# Patient Record
Sex: FEMALE | Race: WHITE | NOT HISPANIC OR LATINO | URBAN - METROPOLITAN AREA
[De-identification: names, ages, dates, MRNs, and addresses within clinical notes are randomized per-mention and may not be internally consistent; named-entity substitution may affect disease eponyms.]

---

## 2017-06-04 ENCOUNTER — INPATIENT (INPATIENT)
Facility: HOSPITAL | Age: 82
LOS: 3 days | Discharge: NURSING, REHAB OR LONG-TERM CARE | End: 2017-06-08
Attending: FAMILY MEDICINE | Admitting: FAMILY MEDICINE
Payer: MEDICARE

## 2017-06-04 VITALS
RESPIRATION RATE: 18 BRPM | WEIGHT: 162.04 LBS | HEIGHT: 64 IN | OXYGEN SATURATION: 100 % | HEART RATE: 53 BPM | SYSTOLIC BLOOD PRESSURE: 149 MMHG | DIASTOLIC BLOOD PRESSURE: 63 MMHG | TEMPERATURE: 98 F

## 2017-06-04 LAB
ALBUMIN SERPL ELPH-MCNC: 3.2 G/DL — LOW (ref 3.3–5)
ALP SERPL-CCNC: 79 U/L — SIGNIFICANT CHANGE UP (ref 40–120)
ALT FLD-CCNC: 15 U/L — SIGNIFICANT CHANGE UP (ref 12–78)
ANION GAP SERPL CALC-SCNC: 4 MMOL/L — LOW (ref 5–17)
APTT BLD: 42.2 SEC — HIGH (ref 27.5–37.4)
AST SERPL-CCNC: 20 U/L — SIGNIFICANT CHANGE UP (ref 15–37)
BASOPHILS # BLD AUTO: 0.1 K/UL — SIGNIFICANT CHANGE UP (ref 0–0.2)
BASOPHILS NFR BLD AUTO: 0.9 % — SIGNIFICANT CHANGE UP (ref 0–2)
BILIRUB SERPL-MCNC: 0.3 MG/DL — SIGNIFICANT CHANGE UP (ref 0.2–1.2)
BUN SERPL-MCNC: 20 MG/DL — SIGNIFICANT CHANGE UP (ref 7–23)
CALCIUM SERPL-MCNC: 9 MG/DL — SIGNIFICANT CHANGE UP (ref 8.5–10.1)
CHLORIDE SERPL-SCNC: 104 MMOL/L — SIGNIFICANT CHANGE UP (ref 96–108)
CO2 SERPL-SCNC: 29 MMOL/L — SIGNIFICANT CHANGE UP (ref 22–31)
CREAT SERPL-MCNC: 1.1 MG/DL — SIGNIFICANT CHANGE UP (ref 0.5–1.3)
EOSINOPHIL # BLD AUTO: 0.5 K/UL — SIGNIFICANT CHANGE UP (ref 0–0.5)
EOSINOPHIL NFR BLD AUTO: 7.2 % — HIGH (ref 0–6)
GLUCOSE SERPL-MCNC: 170 MG/DL — HIGH (ref 70–99)
HCT VFR BLD CALC: 37.7 % — SIGNIFICANT CHANGE UP (ref 34.5–45)
HGB BLD-MCNC: 12.8 G/DL — SIGNIFICANT CHANGE UP (ref 11.5–15.5)
INR BLD: 2.67 RATIO — HIGH (ref 0.88–1.16)
LYMPHOCYTES # BLD AUTO: 1.4 K/UL — SIGNIFICANT CHANGE UP (ref 1–3.3)
LYMPHOCYTES # BLD AUTO: 19.7 % — SIGNIFICANT CHANGE UP (ref 13–44)
MCHC RBC-ENTMCNC: 32 PG — SIGNIFICANT CHANGE UP (ref 27–34)
MCHC RBC-ENTMCNC: 34 GM/DL — SIGNIFICANT CHANGE UP (ref 32–36)
MCV RBC AUTO: 94.1 FL — SIGNIFICANT CHANGE UP (ref 80–100)
MONOCYTES # BLD AUTO: 0.7 K/UL — SIGNIFICANT CHANGE UP (ref 0–0.9)
MONOCYTES NFR BLD AUTO: 9.2 % — SIGNIFICANT CHANGE UP (ref 2–14)
NEUTROPHILS # BLD AUTO: 4.6 K/UL — SIGNIFICANT CHANGE UP (ref 1.8–7.4)
NEUTROPHILS NFR BLD AUTO: 63 % — SIGNIFICANT CHANGE UP (ref 43–77)
PLATELET # BLD AUTO: 181 K/UL — SIGNIFICANT CHANGE UP (ref 150–400)
POTASSIUM SERPL-MCNC: 4.4 MMOL/L — SIGNIFICANT CHANGE UP (ref 3.5–5.3)
POTASSIUM SERPL-SCNC: 4.4 MMOL/L — SIGNIFICANT CHANGE UP (ref 3.5–5.3)
PROT SERPL-MCNC: 7.1 GM/DL — SIGNIFICANT CHANGE UP (ref 6–8.3)
PROTHROM AB SERPL-ACNC: 29.4 SEC — HIGH (ref 9.8–12.7)
RBC # BLD: 4 M/UL — SIGNIFICANT CHANGE UP (ref 3.8–5.2)
RBC # FLD: 11.9 % — SIGNIFICANT CHANGE UP (ref 10.3–14.5)
SODIUM SERPL-SCNC: 137 MMOL/L — SIGNIFICANT CHANGE UP (ref 135–145)
WBC # BLD: 7.3 K/UL — SIGNIFICANT CHANGE UP (ref 3.8–10.5)
WBC # FLD AUTO: 7.3 K/UL — SIGNIFICANT CHANGE UP (ref 3.8–10.5)

## 2017-06-04 PROCEDURE — 73060 X-RAY EXAM OF HUMERUS: CPT | Mod: 26,RT

## 2017-06-04 PROCEDURE — 99285 EMERGENCY DEPT VISIT HI MDM: CPT

## 2017-06-04 PROCEDURE — 70450 CT HEAD/BRAIN W/O DYE: CPT | Mod: 26

## 2017-06-04 PROCEDURE — 73502 X-RAY EXAM HIP UNI 2-3 VIEWS: CPT | Mod: 26

## 2017-06-04 PROCEDURE — 93010 ELECTROCARDIOGRAM REPORT: CPT

## 2017-06-04 PROCEDURE — 72125 CT NECK SPINE W/O DYE: CPT | Mod: 26

## 2017-06-04 PROCEDURE — 71010: CPT | Mod: 26

## 2017-06-04 PROCEDURE — 73030 X-RAY EXAM OF SHOULDER: CPT | Mod: 26,RT

## 2017-06-04 RX ORDER — ACETAMINOPHEN 500 MG
650 TABLET ORAL ONCE
Qty: 0 | Refills: 0 | Status: DISCONTINUED | OUTPATIENT
Start: 2017-06-04 | End: 2017-06-04

## 2017-06-04 RX ORDER — MORPHINE SULFATE 50 MG/1
4 CAPSULE, EXTENDED RELEASE ORAL ONCE
Qty: 0 | Refills: 0 | Status: DISCONTINUED | OUTPATIENT
Start: 2017-06-04 | End: 2017-06-04

## 2017-06-04 RX ORDER — ONDANSETRON 8 MG/1
4 TABLET, FILM COATED ORAL ONCE
Qty: 0 | Refills: 0 | Status: COMPLETED | OUTPATIENT
Start: 2017-06-04 | End: 2017-06-04

## 2017-06-04 RX ADMIN — MORPHINE SULFATE 4 MILLIGRAM(S): 50 CAPSULE, EXTENDED RELEASE ORAL at 19:59

## 2017-06-04 RX ADMIN — MORPHINE SULFATE 4 MILLIGRAM(S): 50 CAPSULE, EXTENDED RELEASE ORAL at 20:13

## 2017-06-04 RX ADMIN — ONDANSETRON 4 MILLIGRAM(S): 8 TABLET, FILM COATED ORAL at 19:59

## 2017-06-04 NOTE — ED ADULT NURSE NOTE - FALL HARM RISK
bones(Osteoporosis,prev fx,steroid use,metastatic bone ca/age(85 years old or older)/coagulation(Bleeding disorder R/T clinical cond/anti-coags)

## 2017-06-04 NOTE — ED PROVIDER NOTE - NS ED MD SCRIBE ATTENDING SCRIBE SECTIONS
HISTORY OF PRESENT ILLNESS/DISPOSITION/RESULTS/REVIEW OF SYSTEMS/PROGRESS NOTE/VITAL SIGNS( Pullset)/PHYSICAL EXAM/PAST MEDICAL/SURGICAL/SOCIAL HISTORY

## 2017-06-04 NOTE — ED PROVIDER NOTE - DETAILS:
I, Benny Schmidt, performed the initial face to face bedside interview with this patient regarding history of present illness, review of symptoms and relevant past medical, social and family history.  I completed an independent physical examination.  I was the initial provider who evaluated this patient.  The history, relevant review of systems, past medical and surgical history, medical decision making, and physical examination was documented by the scribe in my presence and I attest to the accuracy of the documentation.

## 2017-06-04 NOTE — ED PROVIDER NOTE - OBJECTIVE STATEMENT
89 y/o female with PMHx of HTN, HLD, DVT (on coumadin) BIBEMS from [facility] assisted [facility] s/p fall. Pt states she was walking behind another resident and going in between chairs. The person to her left side fell and fell into her and knocked her down to her right side. Does not remember if she outstretched her right hand but fell onto right shoulder. No LOC but may have hit head. C/o pain to right shoulder. Denies any hip pain. Can move all other extremities. Denies CP, SOB, abd pain.

## 2017-06-04 NOTE — ED PROVIDER NOTE - PROGRESS NOTE DETAILS
Esteban Barriga:   [phone]  cell [phone] patient evaluated by Case management; who states patient  needs admission to medicine for placement in rehab  d/w hospitalist     _md zachery

## 2017-06-04 NOTE — ED ADULT NURSE NOTE - OBJECTIVE STATEMENT
Pt stating that she was at nursing facility where she had a mechanical fall.  SEE TRAUMA FLOW SHEET.  Pt denies LOC, c/o right shoulder pain

## 2017-06-04 NOTE — ED PROVIDER NOTE - MUSCULOSKELETAL, MLM
+right anterior shoulder TTP. 2+ pulses. Able to move all other extremities. Limited ROM to right shoulder secondary to pain with swelling and deformity.

## 2017-06-05 PROCEDURE — 99285 EMERGENCY DEPT VISIT HI MDM: CPT

## 2017-06-05 RX ORDER — ASPIRIN/CALCIUM CARB/MAGNESIUM 324 MG
81 TABLET ORAL DAILY
Qty: 0 | Refills: 0 | Status: DISCONTINUED | OUTPATIENT
Start: 2017-06-05 | End: 2017-06-08

## 2017-06-05 RX ORDER — WARFARIN SODIUM 2.5 MG/1
3 TABLET ORAL ONCE
Qty: 0 | Refills: 0 | Status: COMPLETED | OUTPATIENT
Start: 2017-06-05 | End: 2017-06-05

## 2017-06-05 RX ORDER — DEXTROSE 50 % IN WATER 50 %
1 SYRINGE (ML) INTRAVENOUS ONCE
Qty: 0 | Refills: 0 | Status: DISCONTINUED | OUTPATIENT
Start: 2017-06-05 | End: 2017-06-08

## 2017-06-05 RX ORDER — BRIMONIDINE TARTRATE 2 MG/MG
1 SOLUTION/ DROPS OPHTHALMIC
Qty: 0 | Refills: 0 | Status: DISCONTINUED | OUTPATIENT
Start: 2017-06-05 | End: 2017-06-08

## 2017-06-05 RX ORDER — TIMOLOL 0.5 %
1 DROPS OPHTHALMIC (EYE)
Qty: 0 | Refills: 0 | Status: DISCONTINUED | OUTPATIENT
Start: 2017-06-05 | End: 2017-06-08

## 2017-06-05 RX ORDER — MORPHINE SULFATE 50 MG/1
2 CAPSULE, EXTENDED RELEASE ORAL
Qty: 0 | Refills: 0 | Status: DISCONTINUED | OUTPATIENT
Start: 2017-06-05 | End: 2017-06-08

## 2017-06-05 RX ORDER — LATANOPROST 0.05 MG/ML
1 SOLUTION/ DROPS OPHTHALMIC; TOPICAL AT BEDTIME
Qty: 0 | Refills: 0 | Status: DISCONTINUED | OUTPATIENT
Start: 2017-06-05 | End: 2017-06-08

## 2017-06-05 RX ORDER — DEXTROSE 50 % IN WATER 50 %
12.5 SYRINGE (ML) INTRAVENOUS ONCE
Qty: 0 | Refills: 0 | Status: DISCONTINUED | OUTPATIENT
Start: 2017-06-05 | End: 2017-06-08

## 2017-06-05 RX ORDER — FAMOTIDINE 10 MG/ML
20 INJECTION INTRAVENOUS DAILY
Qty: 0 | Refills: 0 | Status: DISCONTINUED | OUTPATIENT
Start: 2017-06-05 | End: 2017-06-08

## 2017-06-05 RX ORDER — ATORVASTATIN CALCIUM 80 MG/1
10 TABLET, FILM COATED ORAL AT BEDTIME
Qty: 0 | Refills: 0 | Status: DISCONTINUED | OUTPATIENT
Start: 2017-06-05 | End: 2017-06-08

## 2017-06-05 RX ORDER — MORPHINE SULFATE 50 MG/1
4 CAPSULE, EXTENDED RELEASE ORAL ONCE
Qty: 0 | Refills: 0 | Status: DISCONTINUED | OUTPATIENT
Start: 2017-06-05 | End: 2017-06-05

## 2017-06-05 RX ORDER — SENNA PLUS 8.6 MG/1
2 TABLET ORAL AT BEDTIME
Qty: 0 | Refills: 0 | Status: DISCONTINUED | OUTPATIENT
Start: 2017-06-05 | End: 2017-06-08

## 2017-06-05 RX ORDER — LOSARTAN POTASSIUM 100 MG/1
25 TABLET, FILM COATED ORAL DAILY
Qty: 0 | Refills: 0 | Status: DISCONTINUED | OUTPATIENT
Start: 2017-06-05 | End: 2017-06-08

## 2017-06-05 RX ORDER — INSULIN LISPRO 100/ML
VIAL (ML) SUBCUTANEOUS
Qty: 0 | Refills: 0 | Status: DISCONTINUED | OUTPATIENT
Start: 2017-06-05 | End: 2017-06-08

## 2017-06-05 RX ORDER — INSULIN GLARGINE 100 [IU]/ML
40 INJECTION, SOLUTION SUBCUTANEOUS EVERY MORNING
Qty: 0 | Refills: 0 | Status: DISCONTINUED | OUTPATIENT
Start: 2017-06-05 | End: 2017-06-08

## 2017-06-05 RX ORDER — GLUCAGON INJECTION, SOLUTION 0.5 MG/.1ML
1 INJECTION, SOLUTION SUBCUTANEOUS ONCE
Qty: 0 | Refills: 0 | Status: DISCONTINUED | OUTPATIENT
Start: 2017-06-05 | End: 2017-06-08

## 2017-06-05 RX ORDER — SODIUM CHLORIDE 9 MG/ML
1000 INJECTION, SOLUTION INTRAVENOUS
Qty: 0 | Refills: 0 | Status: DISCONTINUED | OUTPATIENT
Start: 2017-06-05 | End: 2017-06-08

## 2017-06-05 RX ORDER — DOCUSATE SODIUM 100 MG
100 CAPSULE ORAL THREE TIMES A DAY
Qty: 0 | Refills: 0 | Status: DISCONTINUED | OUTPATIENT
Start: 2017-06-05 | End: 2017-06-08

## 2017-06-05 RX ORDER — INSULIN HUMAN 100 [IU]/ML
4 INJECTION, SOLUTION SUBCUTANEOUS ONCE
Qty: 0 | Refills: 0 | Status: COMPLETED | OUTPATIENT
Start: 2017-06-05 | End: 2017-06-05

## 2017-06-05 RX ORDER — ONDANSETRON 8 MG/1
4 TABLET, FILM COATED ORAL EVERY 6 HOURS
Qty: 0 | Refills: 0 | Status: DISCONTINUED | OUTPATIENT
Start: 2017-06-05 | End: 2017-06-08

## 2017-06-05 RX ADMIN — INSULIN GLARGINE 40 UNIT(S): 100 INJECTION, SOLUTION SUBCUTANEOUS at 16:42

## 2017-06-05 RX ADMIN — BRIMONIDINE TARTRATE 1 DROP(S): 2 SOLUTION/ DROPS OPHTHALMIC at 18:52

## 2017-06-05 RX ADMIN — Medication 1 DROP(S): at 18:52

## 2017-06-05 RX ADMIN — LATANOPROST 1 DROP(S): 0.05 SOLUTION/ DROPS OPHTHALMIC; TOPICAL at 21:42

## 2017-06-05 RX ADMIN — LOSARTAN POTASSIUM 25 MILLIGRAM(S): 100 TABLET, FILM COATED ORAL at 16:42

## 2017-06-05 RX ADMIN — WARFARIN SODIUM 3 MILLIGRAM(S): 2.5 TABLET ORAL at 21:37

## 2017-06-05 RX ADMIN — Medication 100 MILLIGRAM(S): at 21:37

## 2017-06-05 RX ADMIN — MORPHINE SULFATE 4 MILLIGRAM(S): 50 CAPSULE, EXTENDED RELEASE ORAL at 06:10

## 2017-06-05 RX ADMIN — Medication: at 16:42

## 2017-06-05 RX ADMIN — Medication 1 TABLET(S): at 16:42

## 2017-06-05 RX ADMIN — INSULIN HUMAN 4 UNIT(S): 100 INJECTION, SOLUTION SUBCUTANEOUS at 13:37

## 2017-06-05 RX ADMIN — MORPHINE SULFATE 4 MILLIGRAM(S): 50 CAPSULE, EXTENDED RELEASE ORAL at 07:00

## 2017-06-05 RX ADMIN — ATORVASTATIN CALCIUM 10 MILLIGRAM(S): 80 TABLET, FILM COATED ORAL at 21:37

## 2017-06-05 NOTE — H&P ADULT - NSHPLABSRESULTS_GEN_ALL_CORE
12.8   7.3   )-----------( 181      ( 04 Jun 2017 17:14 )             37.7     04 Jun 2017 17:14    137    |  104    |  20     ----------------------------<  170    4.4     |  29     |  1.10     Ca    9.0        04 Jun 2017 17:14    TPro  7.1    /  Alb  3.2    /  TBili  0.3    /  DBili  x      /  AST  20     /  ALT  15     /  AlkPhos  79     04 Jun 2017 17:14    PT/INR - ( 04 Jun 2017 17:14 )   PT: 29.4 sec;   INR: 2.67 ratio         PTT - ( 04 Jun 2017 17:14 )  PTT:42.2 sec  CAPILLARY BLOOD GLUCOSE    LIVER FUNCTIONS - ( 04 Jun 2017 17:14 )  Alb: 3.2 g/dL / Pro: 7.1 gm/dL / ALK PHOS: 79 U/L / ALT: 15 U/L / AST: 20 U/L / GGT: x

## 2017-06-05 NOTE — PHYSICAL THERAPY INITIAL EVALUATION ADULT - GAIT TRAINING, PT EVAL
STG: ambulate with tanvi walker/quad cane with min A in 2 weeks   LTG: patient independent with RW in 8 weeks.

## 2017-06-05 NOTE — H&P ADULT - HISTORY OF PRESENT ILLNESS
Patient is 89yo female with PMHx, DM, HTN, HLD, DVT on Coumadin dx April 2015, [facility] from [facility] s/p fall. As per the patient she was standing when another person fell onto her and knocked her down to the floor, falling on her right side, sustaining a R humeral head fracture. Seen by ortho in the ER, consult noted, patient's R arm placed in a sling.

## 2017-06-05 NOTE — H&P ADULT - ASSESSMENT
89yo female a.w R humerus fracture    # Humerus Fracture  - seen by ortho, consult appreciated  - pain control  - PT eval  - Rehab     # Hx of DVT  - unclear why patient is on prolonged A/C, would clarify with PCP  - cont with Coumadin for now    # HTN  - Cozaar    # DM  - Lantus, Lispro    # Glaucoma  - Comgiban, Lumigan    # DVT ppx, Coumadin    # Admit, stable

## 2017-06-05 NOTE — PHYSICAL THERAPY INITIAL EVALUATION ADULT - PERTINENT HX OF CURRENT PROBLEM, REHAB EVAL
89 yo F presents to ED post a fall at home- [facility]. Xrays (+) for prox humerus fx

## 2017-06-05 NOTE — H&P ADULT - NSHPREVIEWOFSYSTEMS_GEN_ALL_CORE
(-)Fever, chills, cough, chest pain, sob, headache, dizziness, palpitations, abd pain, n/v/d, leg swelling  (+)R arm pain

## 2017-06-05 NOTE — CONSULT NOTE ADULT - SUBJECTIVE AND OBJECTIVE BOX
89yFemale right hand dominant c/o R shoulder pain  s/p mechanical fall yesterday. Patient denies head hit or LOC. Patient denies numbness or tingling in the RUE. Patient denies any other injuries. Patient states she normally walks with a walker and has balance issues at baseline requiring the use of both arms.    PMH: DM    PSH: L Hip IMN 2015 by Dr. Young    AH: NKDA    Meds: See med rec    T(C): 37  HR: 78  BP: 155/48  RR: 17  SpO2: 96%  Wt(kg): --    PE RUE:  Skin intact, arm in sling and guarded, SILT C5-T1, +AIN/PIN/Ulnar/Radial/Musc/Median, +elbow/wrist ROM, shoulder ROM limited 2/2 pain, +radial pulse, soft compartments.    Secondary:  No TTP over bony landmarks, SILT BL, ROM intact BL, distal pulses palpable.    Imaging:  XR demonstrating R shoulder dislocation

## 2017-06-05 NOTE — PHYSICAL THERAPY INITIAL EVALUATION ADULT - TRANSFER TRAINING, PT EVAL
STG: sit to/from stand with supervision and device in 2 weeks.  LTG: independent in all transfers in 6 weeks.

## 2017-06-05 NOTE — H&P ADULT - NSHPPHYSICALEXAM_GEN_ALL_CORE
T(C): 37.1, Max: 37.1 (06-05 @ 13:01)  HR: 78 (53 - 78)  BP: 155/48 (119/46 - 155/48)  RR: 17 (16 - 18)  SpO2: 96% (96% - 100%)  Wt(kg): --      Gen: AAOx3, NAD  HEENT: NCAT, EOMI  Neck: Supple  CV: nml S1S2, RRR  Lungs: CTABL  Abd: Soft, NT, ND, BS+  Ext: No edema, R arm in a sling  Neuro: Non focal

## 2017-06-06 LAB
ANION GAP SERPL CALC-SCNC: 6 MMOL/L — SIGNIFICANT CHANGE UP (ref 5–17)
BASOPHILS # BLD AUTO: 0.1 K/UL — SIGNIFICANT CHANGE UP (ref 0–0.2)
BASOPHILS NFR BLD AUTO: 0.8 % — SIGNIFICANT CHANGE UP (ref 0–2)
BUN SERPL-MCNC: 22 MG/DL — SIGNIFICANT CHANGE UP (ref 7–23)
CALCIUM SERPL-MCNC: 8.4 MG/DL — LOW (ref 8.5–10.1)
CHLORIDE SERPL-SCNC: 103 MMOL/L — SIGNIFICANT CHANGE UP (ref 96–108)
CO2 SERPL-SCNC: 27 MMOL/L — SIGNIFICANT CHANGE UP (ref 22–31)
CREAT SERPL-MCNC: 0.97 MG/DL — SIGNIFICANT CHANGE UP (ref 0.5–1.3)
EOSINOPHIL # BLD AUTO: 0.4 K/UL — SIGNIFICANT CHANGE UP (ref 0–0.5)
EOSINOPHIL NFR BLD AUTO: 4.8 % — SIGNIFICANT CHANGE UP (ref 0–6)
GLUCOSE SERPL-MCNC: 164 MG/DL — HIGH (ref 70–99)
HBA1C BLD-MCNC: 7.5 % — HIGH (ref 4–5.6)
HCT VFR BLD CALC: 33.8 % — LOW (ref 34.5–45)
HGB BLD-MCNC: 11.8 G/DL — SIGNIFICANT CHANGE UP (ref 11.5–15.5)
INR BLD: 2.26 RATIO — HIGH (ref 0.88–1.16)
INR BLD: 2.29 RATIO — HIGH (ref 0.88–1.16)
LYMPHOCYTES # BLD AUTO: 1.3 K/UL — SIGNIFICANT CHANGE UP (ref 1–3.3)
LYMPHOCYTES # BLD AUTO: 14.6 % — SIGNIFICANT CHANGE UP (ref 13–44)
MCHC RBC-ENTMCNC: 32.2 PG — SIGNIFICANT CHANGE UP (ref 27–34)
MCHC RBC-ENTMCNC: 34.8 GM/DL — SIGNIFICANT CHANGE UP (ref 32–36)
MCV RBC AUTO: 92.7 FL — SIGNIFICANT CHANGE UP (ref 80–100)
MONOCYTES # BLD AUTO: 1 K/UL — HIGH (ref 0–0.9)
MONOCYTES NFR BLD AUTO: 11.3 % — SIGNIFICANT CHANGE UP (ref 2–14)
NEUTROPHILS # BLD AUTO: 5.9 K/UL — SIGNIFICANT CHANGE UP (ref 1.8–7.4)
NEUTROPHILS NFR BLD AUTO: 68.4 % — SIGNIFICANT CHANGE UP (ref 43–77)
PLATELET # BLD AUTO: 151 K/UL — SIGNIFICANT CHANGE UP (ref 150–400)
POTASSIUM SERPL-MCNC: 4.2 MMOL/L — SIGNIFICANT CHANGE UP (ref 3.5–5.3)
POTASSIUM SERPL-SCNC: 4.2 MMOL/L — SIGNIFICANT CHANGE UP (ref 3.5–5.3)
PROTHROM AB SERPL-ACNC: 24.8 SEC — HIGH (ref 9.8–12.7)
PROTHROM AB SERPL-ACNC: 25.2 SEC — HIGH (ref 9.8–12.7)
RBC # BLD: 3.65 M/UL — LOW (ref 3.8–5.2)
RBC # FLD: 11.6 % — SIGNIFICANT CHANGE UP (ref 10.3–14.5)
SODIUM SERPL-SCNC: 136 MMOL/L — SIGNIFICANT CHANGE UP (ref 135–145)
WBC # BLD: 8.6 K/UL — SIGNIFICANT CHANGE UP (ref 3.8–10.5)
WBC # FLD AUTO: 8.6 K/UL — SIGNIFICANT CHANGE UP (ref 3.8–10.5)

## 2017-06-06 RX ORDER — WARFARIN SODIUM 2.5 MG/1
3 TABLET ORAL DAILY
Qty: 0 | Refills: 0 | Status: DISCONTINUED | OUTPATIENT
Start: 2017-06-06 | End: 2017-06-08

## 2017-06-06 RX ADMIN — Medication 1 DROP(S): at 06:05

## 2017-06-06 RX ADMIN — Medication 81 MILLIGRAM(S): at 11:16

## 2017-06-06 RX ADMIN — Medication 100 MILLIGRAM(S): at 21:52

## 2017-06-06 RX ADMIN — Medication 100 MILLIGRAM(S): at 13:37

## 2017-06-06 RX ADMIN — BRIMONIDINE TARTRATE 1 DROP(S): 2 SOLUTION/ DROPS OPHTHALMIC at 17:19

## 2017-06-06 RX ADMIN — Medication 1 TABLET(S): at 11:16

## 2017-06-06 RX ADMIN — Medication 100 MILLIGRAM(S): at 06:01

## 2017-06-06 RX ADMIN — FAMOTIDINE 20 MILLIGRAM(S): 10 INJECTION INTRAVENOUS at 11:16

## 2017-06-06 RX ADMIN — Medication 2: at 08:44

## 2017-06-06 RX ADMIN — WARFARIN SODIUM 3 MILLIGRAM(S): 2.5 TABLET ORAL at 21:52

## 2017-06-06 RX ADMIN — Medication 4: at 17:18

## 2017-06-06 RX ADMIN — LOSARTAN POTASSIUM 25 MILLIGRAM(S): 100 TABLET, FILM COATED ORAL at 06:01

## 2017-06-06 RX ADMIN — LATANOPROST 1 DROP(S): 0.05 SOLUTION/ DROPS OPHTHALMIC; TOPICAL at 21:52

## 2017-06-06 RX ADMIN — INSULIN GLARGINE 40 UNIT(S): 100 INJECTION, SOLUTION SUBCUTANEOUS at 08:53

## 2017-06-06 RX ADMIN — Medication 1 DROP(S): at 17:19

## 2017-06-06 RX ADMIN — BRIMONIDINE TARTRATE 1 DROP(S): 2 SOLUTION/ DROPS OPHTHALMIC at 06:01

## 2017-06-06 RX ADMIN — ATORVASTATIN CALCIUM 10 MILLIGRAM(S): 80 TABLET, FILM COATED ORAL at 21:52

## 2017-06-06 RX ADMIN — Medication 6: at 11:47

## 2017-06-06 NOTE — PROGRESS NOTE ADULT - ASSESSMENT
89yo female a.w R humerus fracture   Transfer care to PMD Dr Alonso Irvin    # Humerus Fracture  - seen by ortho, consult appreciated  - pain control  - PT eval  - Continue sling  - Rehab     # Hx of DVT  - unclear why patient is on prolonged A/C, would clarify with PCP  - cont with Coumadin for now    # HTN  - Cozaar    # DM  - Lantus, Lispro    # Glaucoma  - Comgiban, Lumigan    # DVT ppx, Coumadin    # Disposition - Transfer to rehab on Thursday. Social Svc working on case - discussed today. 89yo female a.w R humerus fracture   Transfer care to PMD Dr Alonso Irvin    # Humerus Fracture  - seen by ortho, consult appreciated  - pain control  - PT eval  - Continue sling  - Rehab     # Hx of DVT  - cont with Coumadin for now    # HTN  - Cozaar    # DM  - Lantus, Lispro    # Glaucoma  - Comgiban, Lumigan    # DVT ppx, Coumadin    # Disposition - Transfer to rehab on Thursday. Social Svc working on case - discussed today.

## 2017-06-06 NOTE — PROGRESS NOTE ADULT - SUBJECTIVE AND OBJECTIVE BOX
SUBJECTIVE:    CHIEF COMPLAINT:  Patient is a 89y old  Female who presents with a chief complaint of s/p fall (05 Jun 2017 17:46)      HPI:  Patient is 89yo female with PMHx, DM, HTN, HLD, DVT on Coumadin dx April 2015, BIBEMS from [facility] s/p fall. As per the patient she was standing when another person fell onto her and knocked her down to the floor, falling on her right side, sustaining a R humeral head fracture. Seen by ortho in the ER, consult noted, patient's R arm placed in a sling. (05 Jun 2017 15:09)      Interval HPI and Overnight Events: Feeling well other than some pain to the shoulder. Tolerating diet. Awaiting PT today.    REVIEW OF SYSTEMS:  CONSTITUTIONAL: No weakness, fevers or chills  EYES/ENT: No visual changes;  No vertigo or throat pain   NECK: No pain or stiffness  RESPIRATORY: No cough, wheezing, hemoptysis; No shortness of breath  CARDIOVASCULAR: No chest pain or palpitations  GASTROINTESTINAL: No abdominal or epigastric pain. No nausea, vomiting, or hematemesis; No diarrhea or constipation. No melena or hematochezia.  GENITOURINARY: No dysuria, frequency or hematuria  NEUROLOGICAL: No numbness or weakness  SKIN: No itching, burning, rashes, or lesions   All other review of systems is negative unless indicated above    OBJECTIVE    Vital Signs Last 24 Hrs  T(C): 36.7, Max: 37.2 (06-05 @ 18:22)  T(F): 98, Max: 98.9 (06-05 @ 18:22)  HR: 70 (65 - 83)  BP: 147/58 (130/65 - 155/48)  BP(mean): --  RR: 18 (14 - 18)  SpO2: 95% (94% - 98%)    MEDICATIONS  (STANDING):  docusate sodium 100milliGRAM(s) Oral three times a day  multivitamin 1Tablet(s) Oral daily  insulin glargine Injectable (LANTUS) 40Unit(s) SubCutaneous every morning  insulin lispro (HumaLOG) corrective regimen sliding scale  SubCutaneous three times a day before meals  dextrose 5%. 1000milliLiter(s) IV Continuous <Continuous>  dextrose 50% Injectable 12.5Gram(s) IV Push once  losartan 25milliGRAM(s) Oral daily  aspirin  chewable 81milliGRAM(s) Oral daily  famotidine    Tablet 20milliGRAM(s) Oral daily  atorvastatin 10milliGRAM(s) Oral at bedtime  latanoprost 0.005% Ophthalmic Solution 1Drop(s) Both EYES at bedtime  timolol 0.25% Solution 1Drop(s) Both EYES two times a day  brimonidine 0.2% Ophthalmic Solution 1Drop(s) Both EYES two times a day      LABS:                         11.8   8.6   )-----------( 151      ( 06 Jun 2017 05:25 )             33.8     06-06    136  |  103  |  22  ----------------------------<  164<H>  4.2   |  27  |  0.97    Ca    8.4<L>      06 Jun 2017 05:25    TPro  7.1  /  Alb  3.2<L>  /  TBili  0.3  /  DBili  x   /  AST  20  /  ALT  15  /  AlkPhos  79  06-04      PT/INR - ( 06 Jun 2017 05:25 )   PT: 24.8 sec;   INR: 2.26 ratio         PTT - ( 04 Jun 2017 17:14 )  PTT:42.2 sec    CAPILLARY BLOOD GLUCOSE  175 (06 Jun 2017 07:27)  278 (05 Jun 2017 22:09)  328 (05 Jun 2017 16:20)        RADIOLOGY [place] Fx  EKG: NSR

## 2017-06-07 LAB
APPEARANCE UR: CLEAR — SIGNIFICANT CHANGE UP
BACTERIA # UR AUTO: (no result)
BILIRUB UR-MCNC: NEGATIVE — SIGNIFICANT CHANGE UP
COLOR SPEC: YELLOW — SIGNIFICANT CHANGE UP
DIFF PNL FLD: (no result)
EPI CELLS # UR: SIGNIFICANT CHANGE UP
GLUCOSE UR QL: NEGATIVE MG/DL — SIGNIFICANT CHANGE UP
INR BLD: 2.35 RATIO — HIGH (ref 0.88–1.16)
KETONES UR-MCNC: NEGATIVE — SIGNIFICANT CHANGE UP
LEUKOCYTE ESTERASE UR-ACNC: (no result)
NITRITE UR-MCNC: NEGATIVE — SIGNIFICANT CHANGE UP
PH UR: 6 — SIGNIFICANT CHANGE UP (ref 5–8)
PROT UR-MCNC: NEGATIVE MG/DL — SIGNIFICANT CHANGE UP
PROTHROM AB SERPL-ACNC: 25.8 SEC — HIGH (ref 9.8–12.7)
RBC CASTS # UR COMP ASSIST: SIGNIFICANT CHANGE UP /HPF (ref 0–4)
SP GR SPEC: 1 — LOW (ref 1.01–1.02)
UROBILINOGEN FLD QL: NEGATIVE MG/DL — SIGNIFICANT CHANGE UP
WBC UR QL: SIGNIFICANT CHANGE UP

## 2017-06-07 RX ORDER — SENNA PLUS 8.6 MG/1
2 TABLET ORAL
Qty: 0 | Refills: 0 | COMMUNITY
Start: 2017-06-07

## 2017-06-07 RX ORDER — BRIMONIDINE TARTRATE 2 MG/MG
1 SOLUTION/ DROPS OPHTHALMIC
Qty: 0 | Refills: 0 | DISCHARGE
Start: 2017-06-07

## 2017-06-07 RX ORDER — ASPIRIN/CALCIUM CARB/MAGNESIUM 324 MG
1 TABLET ORAL
Qty: 0 | Refills: 0 | DISCHARGE
Start: 2017-06-07

## 2017-06-07 RX ORDER — INSULIN LISPRO 100/ML
2 VIAL (ML) SUBCUTANEOUS
Qty: 0 | Refills: 0 | DISCHARGE
Start: 2017-06-07

## 2017-06-07 RX ORDER — LOSARTAN POTASSIUM 100 MG/1
1 TABLET, FILM COATED ORAL
Qty: 0 | Refills: 0 | COMMUNITY
Start: 2017-06-07

## 2017-06-07 RX ORDER — ATORVASTATIN CALCIUM 80 MG/1
1 TABLET, FILM COATED ORAL
Qty: 0 | Refills: 0 | DISCHARGE
Start: 2017-06-07

## 2017-06-07 RX ORDER — DOCUSATE SODIUM 100 MG
1 CAPSULE ORAL
Qty: 0 | Refills: 0 | COMMUNITY
Start: 2017-06-07

## 2017-06-07 RX ORDER — WARFARIN SODIUM 2.5 MG/1
1 TABLET ORAL
Qty: 0 | Refills: 0 | COMMUNITY
Start: 2017-06-07

## 2017-06-07 RX ORDER — INSULIN GLARGINE 100 [IU]/ML
40 INJECTION, SOLUTION SUBCUTANEOUS
Qty: 0 | Refills: 0 | DISCHARGE
Start: 2017-06-07

## 2017-06-07 RX ORDER — FAMOTIDINE 10 MG/ML
1 INJECTION INTRAVENOUS
Qty: 0 | Refills: 0 | COMMUNITY
Start: 2017-06-07

## 2017-06-07 RX ORDER — LATANOPROST 0.05 MG/ML
1 SOLUTION/ DROPS OPHTHALMIC; TOPICAL
Qty: 0 | Refills: 0 | DISCHARGE
Start: 2017-06-07

## 2017-06-07 RX ORDER — TIMOLOL 0.5 %
1 DROPS OPHTHALMIC (EYE)
Qty: 0 | Refills: 0 | DISCHARGE
Start: 2017-06-07

## 2017-06-07 RX ADMIN — Medication 1 DROP(S): at 05:52

## 2017-06-07 RX ADMIN — Medication 2: at 17:13

## 2017-06-07 RX ADMIN — Medication 100 MILLIGRAM(S): at 05:51

## 2017-06-07 RX ADMIN — Medication 2: at 12:01

## 2017-06-07 RX ADMIN — LOSARTAN POTASSIUM 25 MILLIGRAM(S): 100 TABLET, FILM COATED ORAL at 05:51

## 2017-06-07 RX ADMIN — Medication 100 MILLIGRAM(S): at 14:21

## 2017-06-07 RX ADMIN — Medication 100 MILLIGRAM(S): at 23:09

## 2017-06-07 RX ADMIN — WARFARIN SODIUM 3 MILLIGRAM(S): 2.5 TABLET ORAL at 23:09

## 2017-06-07 RX ADMIN — Medication 2: at 09:11

## 2017-06-07 RX ADMIN — Medication 1 TABLET(S): at 10:50

## 2017-06-07 RX ADMIN — LATANOPROST 1 DROP(S): 0.05 SOLUTION/ DROPS OPHTHALMIC; TOPICAL at 23:09

## 2017-06-07 RX ADMIN — Medication 1 DROP(S): at 17:19

## 2017-06-07 RX ADMIN — INSULIN GLARGINE 40 UNIT(S): 100 INJECTION, SOLUTION SUBCUTANEOUS at 09:10

## 2017-06-07 RX ADMIN — Medication 81 MILLIGRAM(S): at 10:50

## 2017-06-07 RX ADMIN — FAMOTIDINE 20 MILLIGRAM(S): 10 INJECTION INTRAVENOUS at 10:50

## 2017-06-07 RX ADMIN — BRIMONIDINE TARTRATE 1 DROP(S): 2 SOLUTION/ DROPS OPHTHALMIC at 17:17

## 2017-06-07 RX ADMIN — BRIMONIDINE TARTRATE 1 DROP(S): 2 SOLUTION/ DROPS OPHTHALMIC at 05:52

## 2017-06-07 RX ADMIN — ATORVASTATIN CALCIUM 10 MILLIGRAM(S): 80 TABLET, FILM COATED ORAL at 23:09

## 2017-06-07 NOTE — DISCHARGE NOTE ADULT - PATIENT PORTAL LINK FT
“You can access the [employer] Patient Portal, offered by [facility], by registering with the following website: [email or web address]”

## 2017-06-07 NOTE — PROGRESS NOTE ADULT - ASSESSMENT
89yo female a.w R humerus fracture   Transfer care to PMD Dr Alonso Irvin    # Humerus Fracture  - ortho following  - pain control  - PT folowing  - Continue sling  - non Wt bearing RUE  - Rehab placement tomorrow     # Hx of DVT and DVT prophylaxis  - cont with Coumadin    # HTN  - Cozaar    # DM  - Lantus, Lispro    # Glaucoma  - Comgiban, Lumigan    # Disposition - Transfer to rehab on tomorrow. Social Svc working on case - discussed again today.

## 2017-06-07 NOTE — DISCHARGE NOTE ADULT - MEDICATION SUMMARY - MEDICATIONS TO TAKE
I will START or STAY ON the medications listed below when I get home from the hospital:    aspirin 81 mg oral tablet, chewable  -- 1 tab(s) by mouth once a day  -- Indication: For Heart    losartan 25 mg oral tablet  -- 1 tab(s) by mouth once a day  -- Indication: For BP    warfarin 3 mg oral tablet  -- 1 tab(s) by mouth once a day  -- Indication: For DVT Hx    insulin glargine  -- 40 unit(s) subcutaneous once a day (at bedtime)  -- Indication: For DM    insulin lispro 100 units/mL subcutaneous solution  -- 2 unit(s) subcutaneous 3 times a day (before meals) ; 2 Unit(s) if Glucose 151 - 200  4 Unit(s) if Glucose 201 - 250  6 Unit(s) if Glucose 251 - 300  8 Unit(s) if Glucose 301 - 350  10 Unit(s) if Glucose 351 - 400  12 Unit(s) if Glucose Greater Than 400  -- 2 unit(s) subcutaneous 3 times a day (before meals) ; 2 Unit(s) if Glucose 151 - 200  4 Unit(s) if Glucose 201 - 250  6 Unit(s) if Glucose 251 - 300  8 Unit(s) if Glucose 301 - 350  10 Unit(s) if Glucose 351 - 400  12 Unit(s) if Glucose Greater Than 400  -- Indication: For DM    atorvastatin 10 mg oral tablet  -- 1 tab(s) by mouth once a day (at bedtime)  -- Indication: For Hyperlipidemia    famotidine 20 mg oral tablet  -- 1 tab(s) by mouth once a day  -- Indication: For Reflux    docusate sodium 100 mg oral capsule  -- 1 cap(s) by mouth 3 times a day  -- Indication: For stool softener    senna oral tablet  -- 2 tab(s) by mouth once a day (at bedtime), As needed, Constipation  -- Indication: For as needed constipation    brimonidine 0.2% ophthalmic solution  -- 1 drop(s) to each affected eye 2 times a day  -- Indication: For opth    timolol maleate 0.25% ophthalmic solution  -- 1 drop(s) to each affected eye 2 times a day  -- Indication: For ophth    latanoprost 0.005% ophthalmic solution  -- 1 drop(s) to each affected eye once a day (at bedtime)  -- Indication: For ophth    Multiple Vitamins oral tablet  -- 1 tab(s) by mouth once a day  -- Indication: For mvi

## 2017-06-07 NOTE — DISCHARGE NOTE ADULT - HOSPITAL COURSE
Pt admitted to ortho unit. Placed in sling. Seen by ortho and PT. Started nwb pt to right upper extremity. Continued on pre admission medications After required length of stay transferred to snf rehab until safe to transition back to [facility]

## 2017-06-07 NOTE — PROGRESS NOTE ADULT - SUBJECTIVE AND OBJECTIVE BOX
SUBJECTIVE:    CHIEF COMPLAINT:  Patient is a 89y old  Female who presents with a chief complaint of s/p fall (05 Jun 2017 17:46)      HPI:  Patient is 89yo female with PMHx, DM, HTN, HLD, DVT on Coumadin dx April 2015, BIBEMS from [facility] s/p fall. As per the patient she was standing when another person fell onto her and knocked her down to the floor, falling on her right side, sustaining a R humeral head fracture. Seen by ortho in the ER, consult noted, patient's R arm placed in a sling. (05 Jun 2017 15:09)      Interval HPI and Overnight Events: Doing well. Ambulating with assistance NWB to right arm with PT. No pain    REVIEW OF SYSTEMS:  CONSTITUTIONAL: No weakness, fevers or chills  EYES/ENT: No visual changes;  No vertigo or throat pain   NECK: No pain or stiffness  RESPIRATORY: No cough, wheezing, hemoptysis; No shortness of breath  CARDIOVASCULAR: No chest pain or palpitations  GASTROINTESTINAL: No abdominal or epigastric pain. No nausea, vomiting, or hematemesis; No diarrhea or constipation. No melena or hematochezia.  GENITOURINARY: No dysuria, frequency or hematuria  NEUROLOGICAL: No numbness or weakness  SKIN: No itching, burning, rashes, or lesions   All other review of systems is negative unless indicated above    OBJECTIVE    Vital Signs Last 24 Hrs  T(C): 36.8, Max: 36.9 (06-06 @ 15:36)  T(F): 98.2, Max: 98.4 (06-06 @ 15:36)  HR: 64 (56 - 64)  BP: 129/63 (129/63 - 133/45)  BP(mean): 68 (68 - 68)  RR: 14 (14 - 16)  SpO2: 96% (96% - 97%)    MEDICATIONS  (STANDING):  docusate sodium 100milliGRAM(s) Oral three times a day  multivitamin 1Tablet(s) Oral daily  insulin glargine Injectable (LANTUS) 40Unit(s) SubCutaneous every morning  insulin lispro (HumaLOG) corrective regimen sliding scale  SubCutaneous three times a day before meals  dextrose 5%. 1000milliLiter(s) IV Continuous <Continuous>  dextrose 50% Injectable 12.5Gram(s) IV Push once  losartan 25milliGRAM(s) Oral daily  aspirin  chewable 81milliGRAM(s) Oral daily  famotidine    Tablet 20milliGRAM(s) Oral daily  atorvastatin 10milliGRAM(s) Oral at bedtime  latanoprost 0.005% Ophthalmic Solution 1Drop(s) Both EYES at bedtime  timolol 0.25% Solution 1Drop(s) Both EYES two times a day  brimonidine 0.2% Ophthalmic Solution 1Drop(s) Both EYES two times a day  warfarin 3milliGRAM(s) Oral daily      LABS:              PT/INR - ( 07 Jun 2017 06:15 )   PT: 25.8 sec;   INR: 2.35 ratio      CAPILLARY BLOOD GLUCOSE  197 (07 Jun 2017 09:20)  119 (06 Jun 2017 22:10)  205 (06 Jun 2017 16:14)  290 (06 Jun 2017 11:04)

## 2017-06-07 NOTE — DISCHARGE NOTE ADULT - PLAN OF CARE
healing, ambulation and return to assisted living sling to right arm. NWB right upper extremity, follow up with ortho

## 2017-06-07 NOTE — DISCHARGE NOTE ADULT - CARE PLAN
Principal Discharge DX:	Other closed nondisplaced fracture of proximal end of right humerus, initial encounter  Goal:	healing, ambulation and return to assisted living  Instructions for follow-up, activity and diet:	sling to right arm. NWB right upper extremity, follow up with ortho  Secondary Diagnosis:	Mixed hyperlipidemia  Secondary Diagnosis:	Essential hypertension

## 2017-06-08 VITALS
SYSTOLIC BLOOD PRESSURE: 97 MMHG | DIASTOLIC BLOOD PRESSURE: 44 MMHG | OXYGEN SATURATION: 96 % | RESPIRATION RATE: 16 BRPM | HEART RATE: 62 BPM

## 2017-06-08 LAB
ANION GAP SERPL CALC-SCNC: 7 MMOL/L — SIGNIFICANT CHANGE UP (ref 5–17)
BUN SERPL-MCNC: 24 MG/DL — HIGH (ref 7–23)
CALCIUM SERPL-MCNC: 8.8 MG/DL — SIGNIFICANT CHANGE UP (ref 8.5–10.1)
CHLORIDE SERPL-SCNC: 101 MMOL/L — SIGNIFICANT CHANGE UP (ref 96–108)
CO2 SERPL-SCNC: 27 MMOL/L — SIGNIFICANT CHANGE UP (ref 22–31)
CREAT SERPL-MCNC: 1.09 MG/DL — SIGNIFICANT CHANGE UP (ref 0.5–1.3)
GLUCOSE SERPL-MCNC: 143 MG/DL — HIGH (ref 70–99)
HCT VFR BLD CALC: 32.6 % — LOW (ref 34.5–45)
HGB BLD-MCNC: 11.4 G/DL — LOW (ref 11.5–15.5)
INR BLD: 2.38 RATIO — HIGH (ref 0.88–1.16)
MCHC RBC-ENTMCNC: 32.6 PG — SIGNIFICANT CHANGE UP (ref 27–34)
MCHC RBC-ENTMCNC: 35 GM/DL — SIGNIFICANT CHANGE UP (ref 32–36)
MCV RBC AUTO: 93.2 FL — SIGNIFICANT CHANGE UP (ref 80–100)
PLATELET # BLD AUTO: 171 K/UL — SIGNIFICANT CHANGE UP (ref 150–400)
POTASSIUM SERPL-MCNC: 4.3 MMOL/L — SIGNIFICANT CHANGE UP (ref 3.5–5.3)
POTASSIUM SERPL-SCNC: 4.3 MMOL/L — SIGNIFICANT CHANGE UP (ref 3.5–5.3)
PROTHROM AB SERPL-ACNC: 26.2 SEC — HIGH (ref 9.8–12.7)
RBC # BLD: 3.5 M/UL — LOW (ref 3.8–5.2)
RBC # FLD: 11.4 % — SIGNIFICANT CHANGE UP (ref 10.3–14.5)
SODIUM SERPL-SCNC: 135 MMOL/L — SIGNIFICANT CHANGE UP (ref 135–145)
WBC # BLD: 8.4 K/UL — SIGNIFICANT CHANGE UP (ref 3.8–10.5)
WBC # FLD AUTO: 8.4 K/UL — SIGNIFICANT CHANGE UP (ref 3.8–10.5)

## 2017-06-08 RX ADMIN — Medication 4: at 12:11

## 2017-06-08 RX ADMIN — Medication 1 DROP(S): at 05:36

## 2017-06-08 RX ADMIN — Medication 1 TABLET(S): at 12:13

## 2017-06-08 RX ADMIN — BRIMONIDINE TARTRATE 1 DROP(S): 2 SOLUTION/ DROPS OPHTHALMIC at 05:35

## 2017-06-08 RX ADMIN — LOSARTAN POTASSIUM 25 MILLIGRAM(S): 100 TABLET, FILM COATED ORAL at 05:35

## 2017-06-08 RX ADMIN — Medication 4: at 08:51

## 2017-06-08 RX ADMIN — FAMOTIDINE 20 MILLIGRAM(S): 10 INJECTION INTRAVENOUS at 12:12

## 2017-06-08 RX ADMIN — Medication 100 MILLIGRAM(S): at 05:35

## 2017-06-08 RX ADMIN — INSULIN GLARGINE 40 UNIT(S): 100 INJECTION, SOLUTION SUBCUTANEOUS at 08:48

## 2017-06-08 RX ADMIN — Medication 81 MILLIGRAM(S): at 12:13

## 2017-06-08 NOTE — PROGRESS NOTE ADULT - ASSESSMENT
89yo female a.w R humerus fracture   Transfer care to PMD Dr Alonso Irvin    # Humerus Fracture  - pain control  - PT folowing  - Continue sling  - non Wt bearing RUE  - Rehab placement today    # Hx of DVT and DVT prophylaxis  - cont with Coumadin    # HTN  - Cozaar    # DM  - Lantus, Lispro    # Glaucoma  - Comgiban, Lumigan    # Disposition - Transfer to rehab on today  Social [facility] made arrangements for transfer.

## 2017-06-08 NOTE — PROGRESS NOTE ADULT - SUBJECTIVE AND OBJECTIVE BOX
SUBJECTIVE:    CHIEF COMPLAINT:  Patient is a 89y old  Female who presents with a chief complaint of s/p fall (07 Jun 2017 09:35)      HPI:  Patient is 89yo female with PMHx, DM, HTN, HLD, DVT on Coumadin dx April 2015, BIBEMS from [facility] s/p fall. As per the patient she was standing when another person fell onto her and knocked her down to the floor, falling on her right side, sustaining a R humeral head fracture. Seen by ortho in the ER, consult noted, patient's R arm placed in a sling. (05 Jun 2017 15:09)      Interval HPI and Overnight Events: Feeling well. No complaints at this time. Waiting for transfer to rehab    REVIEW OF SYSTEMS:  CONSTITUTIONAL: No weakness, fevers or chills  EYES/ENT: No visual changes;  No vertigo or throat pain   NECK: No pain or stiffness  RESPIRATORY: No cough, wheezing, hemoptysis; No shortness of breath  CARDIOVASCULAR: No chest pain or palpitations  GASTROINTESTINAL: No abdominal or epigastric pain. No nausea, vomiting, or hematemesis; No diarrhea or constipation. No melena or hematochezia.  GENITOURINARY: No dysuria, frequency or hematuria  NEUROLOGICAL: No numbness or weakness  SKIN: No itching, burning, rashes, or lesions   All other review of systems is negative unless indicated above    OBJECTIVE    Vital Signs Last 24 Hrs  T(C): 36.8, Max: 37.1 (06-07 @ 16:53)  T(F): 98.2, Max: 98.8 (06-07 @ 16:53)  HR: 62 (62 - 74)  BP: 97/44 (97/44 - 132/42)  BP(mean): 59 (59 - 64)  RR: 16 (14 - 16)  SpO2: 96% (94% - 98%)    MEDICATIONS  (STANDING):  docusate sodium 100milliGRAM(s) Oral three times a day  multivitamin 1Tablet(s) Oral daily  insulin glargine Injectable (LANTUS) 40Unit(s) SubCutaneous every morning  insulin lispro (HumaLOG) corrective regimen sliding scale  SubCutaneous three times a day before meals  dextrose 5%. 1000milliLiter(s) IV Continuous <Continuous>  dextrose 50% Injectable 12.5Gram(s) IV Push once  losartan 25milliGRAM(s) Oral daily  aspirin  chewable 81milliGRAM(s) Oral daily  famotidine    Tablet 20milliGRAM(s) Oral daily  atorvastatin 10milliGRAM(s) Oral at bedtime  latanoprost 0.005% Ophthalmic Solution 1Drop(s) Both EYES at bedtime  timolol 0.25% Solution 1Drop(s) Both EYES two times a day  brimonidine 0.2% Ophthalmic Solution 1Drop(s) Both EYES two times a day  warfarin 3milliGRAM(s) Oral daily      LABS:                         11.4   8.4   )-----------( 171      ( 08 Jun 2017 06:49 )             32.6     06-08    135  |  101  |  24<H>  ----------------------------<  143<H>  4.3   |  27  |  1.09    Ca    8.8      08 Jun 2017 06:49        PT/INR - ( 08 Jun 2017 06:49 )   PT: 26.2 sec;   INR: 2.38 ratio      CAPILLARY BLOOD GLUCOSE  244 (08 Jun 2017 12:01)  226 (08 Jun 2017 08:46)  145 (08 Jun 2017 06:45)  151 (07 Jun 2017 23:00)

## 2017-06-08 NOTE — PROGRESS NOTE ADULT - CARDIOVASCULAR
Regular rate & rhythm, normal S1, S2; no murmurs, gallops or rubs; no S3, S4

## 2017-06-09 LAB
CULTURE RESULTS: SIGNIFICANT CHANGE UP
SPECIMEN SOURCE: SIGNIFICANT CHANGE UP

## 2017-06-12 DIAGNOSIS — I10 ESSENTIAL (PRIMARY) HYPERTENSION: ICD-10-CM
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
## 2017-06-12 DIAGNOSIS — S42.201A UNSPECIFIED FRACTURE OF UPPER END OF RIGHT HUMERUS, INITIAL ENCOUNTER FOR CLOSED FRACTURE: ICD-10-CM
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
## 2017-06-12 DIAGNOSIS — H40.9 UNSPECIFIED GLAUCOMA: ICD-10-CM

## 2017-06-12 DIAGNOSIS — E78.5 HYPERLIPIDEMIA, UNSPECIFIED: ICD-10-CM

## 2017-06-12 DIAGNOSIS — E11.9 TYPE 2 DIABETES MELLITUS WITHOUT COMPLICATIONS: ICD-10-CM

## 2017-10-18 ENCOUNTER — RECORD ABSTRACTING (OUTPATIENT)
Age: 82
End: 2017-10-18

## 2017-10-18 DIAGNOSIS — I25.10 ATHEROSCLEROTIC HEART DISEASE OF NATIVE CORONARY ARTERY W/OUT ANGINA PECTORIS: ICD-10-CM

## 2017-10-18 DIAGNOSIS — Z87.891 PERSONAL HISTORY OF NICOTINE DEPENDENCE: ICD-10-CM

## 2017-10-18 DIAGNOSIS — Z63.4 DISAPPEARANCE AND DEATH OF FAMILY MEMBER: ICD-10-CM

## 2017-10-18 DIAGNOSIS — R76.11 NONSPECIFIC REACTION TO TUBERCULIN SKIN TEST W/OUT ACTIVE TUBERCULOSIS: ICD-10-CM

## 2017-10-18 RX ORDER — BIMATOPROST 0.1 MG/ML
0.01 SOLUTION/ DROPS OPHTHALMIC
Refills: 0 | Status: ACTIVE | COMMUNITY

## 2017-10-18 SDOH — SOCIAL STABILITY - SOCIAL INSECURITY: DISSAPEARANCE AND DEATH OF FAMILY MEMBER: Z63.4

## 2017-10-27 ENCOUNTER — RECORD ABSTRACTING (OUTPATIENT)
Age: 82
End: 2017-10-27

## 2017-10-30 ENCOUNTER — APPOINTMENT (OUTPATIENT)
Dept: FAMILY MEDICINE | Facility: CLINIC | Age: 82
End: 2017-10-30
Payer: MEDICARE

## 2017-10-30 VITALS
WEIGHT: 160 LBS | HEIGHT: 64 IN | SYSTOLIC BLOOD PRESSURE: 108 MMHG | DIASTOLIC BLOOD PRESSURE: 52 MMHG | BODY MASS INDEX: 27.31 KG/M2

## 2017-10-30 DIAGNOSIS — Z80.6 FAMILY HISTORY OF LEUKEMIA: ICD-10-CM

## 2017-10-30 DIAGNOSIS — H40.9 UNSPECIFIED GLAUCOMA: ICD-10-CM

## 2017-10-30 DIAGNOSIS — Z80.3 FAMILY HISTORY OF MALIGNANT NEOPLASM OF BREAST: ICD-10-CM

## 2017-10-30 DIAGNOSIS — Z80.2 FAMILY HISTORY OF MALIGNANT NEOPLASM OF OTHER RESPIRATORY AND INTRATHORACIC ORGANS: ICD-10-CM

## 2017-10-30 DIAGNOSIS — Z80.42 FAMILY HISTORY OF MALIGNANT NEOPLASM OF PROSTATE: ICD-10-CM

## 2017-10-30 PROCEDURE — 90688 IIV4 VACCINE SPLT 0.5 ML IM: CPT

## 2017-10-30 PROCEDURE — G0008: CPT

## 2017-10-30 PROCEDURE — 99215 OFFICE O/P EST HI 40 MIN: CPT | Mod: 25

## 2017-10-30 RX ORDER — PEN NEEDLE, DIABETIC 31 G X1/4"
31G X 6 MM NEEDLE, DISPOSABLE MISCELLANEOUS
Qty: 50 | Refills: 0 | Status: ACTIVE | COMMUNITY
Start: 2017-08-17

## 2017-11-03 LAB
INR PPP: 2.75 RATIO
POCT-PROTHROMBIN TIME: 32.9 SECS

## 2017-11-08 ENCOUNTER — CLINICAL ADVICE (OUTPATIENT)
Age: 82
End: 2017-11-08

## 2017-11-08 LAB
INR PPP: 1.2
PT BLD: 14

## 2017-11-13 NOTE — H&P ADULT - NSHPLANGLIMITEDENGLISH_GEN_A_CORE
STRESS ECHOCARDIOGRAM



DATE OF SERVICE:

11/13/2017



INDICATIONS:



MEDICATIONS::

Oxycodone, simvastatin, morphine, esomeprazole.



BASELINE HEART RATE:

67



BASELINE BLOOD PRESSURE:

117/68



MAXIMUM HEART RATE:

151



MAXIMUM BLOOD PRESSURE:

154/46



85% MPHR:

154



100% MPHR:

181



METS:

11.8



MAXIMUM STAGE REACHED:

IV



TOTAL EXERCISE TIME:

10 minutes



CLINICAL INFORMATION:



Patient was exercised for a total period of 10 minutes.  The peak heart rate of 151 was

achieved, maximum blood pressure of 154/46 mmHg was noted.  Her resting EKG shows

normal sinus rhythm with normal MD interval and QRS duration and normal ST-T waves.  No

ST-segment depression suggestive of ischemia was noted.



The baseline echocardiographic images reveal normal left ventricular chamber size with

normal left ventricular systolic function.  In the immediate postexercise period,

normal increase in the wall thickness and contractility is noted.



FINAL IMPRESSION:

This stress echocardiographic study is negative for stress-induced ischemia.  The

patient's exercise tolerance is normal. No dysrhythmias are noted.





MMODL / IJN: [id number] / Job#: [id number]
No

## 2017-11-17 LAB
INR PPP: 1.51
PT BLD: 17.7

## 2017-11-18 LAB
INR PPP: 1.92
PT BLD: 22.7

## 2017-11-20 ENCOUNTER — APPOINTMENT (OUTPATIENT)
Dept: FAMILY MEDICINE | Facility: CLINIC | Age: 82
End: 2017-11-20

## 2017-11-29 ENCOUNTER — OUTPATIENT (OUTPATIENT)
Dept: OUTPATIENT SERVICES | Facility: HOSPITAL | Age: 82
LOS: 1 days | End: 2017-11-29
Payer: MEDICARE

## 2017-11-29 ENCOUNTER — TRANSCRIPTION ENCOUNTER (OUTPATIENT)
Age: 82
End: 2017-11-29

## 2017-11-29 VITALS
HEART RATE: 56 BPM | RESPIRATION RATE: 16 BRPM | SYSTOLIC BLOOD PRESSURE: 143 MMHG | DIASTOLIC BLOOD PRESSURE: 41 MMHG | OXYGEN SATURATION: 96 %

## 2017-11-29 VITALS
HEART RATE: 50 BPM | SYSTOLIC BLOOD PRESSURE: 125 MMHG | HEIGHT: 64 IN | TEMPERATURE: 98 F | WEIGHT: 160.94 LBS | OXYGEN SATURATION: 96 % | RESPIRATION RATE: 20 BRPM | DIASTOLIC BLOOD PRESSURE: 44 MMHG

## 2017-11-29 DIAGNOSIS — Z90.89 ACQUIRED ABSENCE OF OTHER ORGANS: Chronic | ICD-10-CM

## 2017-11-29 DIAGNOSIS — Z90.49 ACQUIRED ABSENCE OF OTHER SPECIFIED PARTS OF DIGESTIVE TRACT: Chronic | ICD-10-CM

## 2017-11-29 DIAGNOSIS — H25.11 AGE-RELATED NUCLEAR CATARACT, RIGHT EYE: ICD-10-CM

## 2017-11-29 DIAGNOSIS — Z90.721 ACQUIRED ABSENCE OF OVARIES, UNILATERAL: Chronic | ICD-10-CM

## 2017-11-29 DIAGNOSIS — C50.919 MALIGNANT NEOPLASM OF UNSPECIFIED SITE OF UNSPECIFIED FEMALE BREAST: Chronic | ICD-10-CM

## 2017-11-29 DIAGNOSIS — Z41.9 ENCOUNTER FOR PROCEDURE FOR PURPOSES OTHER THAN REMEDYING HEALTH STATE, UNSPECIFIED: Chronic | ICD-10-CM

## 2017-11-29 LAB — GLUCOSE BLDC GLUCOMTR-MCNC: 165 MG/DL — HIGH (ref 70–99)

## 2017-11-29 PROCEDURE — 66984 XCAPSL CTRC RMVL W/O ECP: CPT | Mod: RT

## 2017-11-29 PROCEDURE — 82962 GLUCOSE BLOOD TEST: CPT

## 2017-11-29 PROCEDURE — V2632: CPT

## 2017-11-29 NOTE — ASU PATIENT PROFILE, ADULT - ABILITY TO HEAR (WITH HEARING AID OR HEARING APPLIANCE IF NORMALLY USED):
Patient has no hearing aids/Mildly to Moderately Impaired: difficulty hearing in some environments or speaker may need to increase volume or speak distinctly normal...

## 2017-11-29 NOTE — ASU DISCHARGE PLAN (ADULT/PEDIATRIC). - NOTIFY
Fever greater than 101/Swelling that continues/Persistent Nausea and Vomiting/Pain not relieved by Medications/Bleeding that does not stop

## 2017-11-29 NOTE — ASU PATIENT PROFILE, ADULT - VISION (WITH CORRECTIVE LENSES IF THE PATIENT USUALLY WEARS THEM):
Patient has wet macular degeneration/Severely impaired: cannot locate objects without hearing or touching them or patient nonresponsive.

## 2017-11-29 NOTE — ASU PATIENT PROFILE, ADULT - PSH
Breast cancer  right massectomy  Elective surgery  left hip surgery from fracture  H/O unilateral oophorectomy  left ovary  History of tonsillectomy    S/P betzy

## 2017-11-29 NOTE — ASU PATIENT PROFILE, ADULT - PMH
Atherosclerosis    Breast cancer  right  DM (diabetes mellitus)  type I  DVT (deep venous thrombosis)    GERD (gastroesophageal reflux disease)    Glaucoma    Hearing loss  b/l  HLD (hyperlipidemia)    HTN (hypertension)    Shoulder fracture, right

## 2017-12-01 LAB
INR PPP: 2.05
PT BLD: 24.3

## 2017-12-06 LAB
INR PPP: 1.83
PT BLD: 21.6

## 2017-12-13 ENCOUNTER — FORM ENCOUNTER (OUTPATIENT)
Age: 82
End: 2017-12-13

## 2017-12-13 LAB
INR PPP: 2.79
PT BLD: 33.4

## 2017-12-14 ENCOUNTER — OUTPATIENT (OUTPATIENT)
Dept: OUTPATIENT SERVICES | Facility: HOSPITAL | Age: 82
LOS: 1 days | End: 2017-12-14
Payer: MEDICARE

## 2017-12-14 ENCOUNTER — APPOINTMENT (OUTPATIENT)
Dept: ULTRASOUND IMAGING | Facility: CLINIC | Age: 82
End: 2017-12-14
Payer: MEDICARE

## 2017-12-14 DIAGNOSIS — Z90.89 ACQUIRED ABSENCE OF OTHER ORGANS: Chronic | ICD-10-CM

## 2017-12-14 DIAGNOSIS — Z90.721 ACQUIRED ABSENCE OF OVARIES, UNILATERAL: Chronic | ICD-10-CM

## 2017-12-14 DIAGNOSIS — Z41.9 ENCOUNTER FOR PROCEDURE FOR PURPOSES OTHER THAN REMEDYING HEALTH STATE, UNSPECIFIED: Chronic | ICD-10-CM

## 2017-12-14 DIAGNOSIS — Z00.8 ENCOUNTER FOR OTHER GENERAL EXAMINATION: ICD-10-CM

## 2017-12-14 DIAGNOSIS — Z90.49 ACQUIRED ABSENCE OF OTHER SPECIFIED PARTS OF DIGESTIVE TRACT: Chronic | ICD-10-CM

## 2017-12-14 DIAGNOSIS — C50.919 MALIGNANT NEOPLASM OF UNSPECIFIED SITE OF UNSPECIFIED FEMALE BREAST: Chronic | ICD-10-CM

## 2017-12-14 PROCEDURE — 93970 EXTREMITY STUDY: CPT

## 2017-12-14 PROCEDURE — 93970 EXTREMITY STUDY: CPT | Mod: 26

## 2017-12-20 ENCOUNTER — RX RENEWAL (OUTPATIENT)
Age: 82
End: 2017-12-20

## 2017-12-27 ENCOUNTER — APPOINTMENT (OUTPATIENT)
Dept: FAMILY MEDICINE | Facility: CLINIC | Age: 82
End: 2017-12-27
Payer: MEDICARE

## 2017-12-27 VITALS
DIASTOLIC BLOOD PRESSURE: 60 MMHG | BODY MASS INDEX: 27.31 KG/M2 | SYSTOLIC BLOOD PRESSURE: 116 MMHG | HEIGHT: 64 IN | WEIGHT: 160 LBS

## 2017-12-27 DIAGNOSIS — Z86.718 PERSONAL HISTORY OF OTHER VENOUS THROMBOSIS AND EMBOLISM: ICD-10-CM

## 2017-12-27 LAB
INR PPP: 3.3
PT BLD: 39.7

## 2017-12-27 PROCEDURE — 99214 OFFICE O/P EST MOD 30 MIN: CPT

## 2017-12-27 RX ORDER — WARFARIN 2 MG/1
2 TABLET ORAL
Qty: 90 | Refills: 0 | Status: DISCONTINUED | COMMUNITY
End: 2017-12-27

## 2017-12-27 RX ORDER — DIFLUPREDNATE 0.5 MG/ML
0.05 EMULSION OPHTHALMIC
Qty: 5 | Refills: 0 | Status: ACTIVE | COMMUNITY
Start: 2017-10-30

## 2017-12-27 RX ORDER — WARFARIN SODIUM 3 MG/1
3 TABLET ORAL
Refills: 0 | Status: DISCONTINUED | COMMUNITY
End: 2017-12-27

## 2017-12-27 RX ORDER — AMOXICILLIN AND CLAVULANATE POTASSIUM 875; 125 MG/1; MG/1
875-125 TABLET, COATED ORAL
Qty: 14 | Refills: 0 | Status: DISCONTINUED | COMMUNITY
Start: 2017-11-04

## 2017-12-28 LAB
INR PPP: 1.54
PT BLD: 18.1

## 2018-01-03 ENCOUNTER — APPOINTMENT (OUTPATIENT)
Dept: FAMILY MEDICINE | Facility: CLINIC | Age: 83
End: 2018-01-03
Payer: MEDICARE

## 2018-01-03 VITALS
WEIGHT: 160 LBS | SYSTOLIC BLOOD PRESSURE: 106 MMHG | HEIGHT: 64 IN | BODY MASS INDEX: 27.31 KG/M2 | DIASTOLIC BLOOD PRESSURE: 50 MMHG

## 2018-01-03 VITALS — TEMPERATURE: 98.5 F

## 2018-01-03 LAB
FLUAV SPEC QL CULT: POSITIVE
FLUBV AG SPEC QL IA: NEGATIVE

## 2018-01-03 PROCEDURE — 87804 INFLUENZA ASSAY W/OPTIC: CPT | Mod: 59,QW

## 2018-01-03 PROCEDURE — 99213 OFFICE O/P EST LOW 20 MIN: CPT | Mod: 25

## 2018-01-08 ENCOUNTER — RX RENEWAL (OUTPATIENT)
Age: 83
End: 2018-01-08

## 2018-01-10 RX ORDER — BENZONATATE 100 MG/1
100 CAPSULE ORAL EVERY 8 HOURS
Qty: 40 | Refills: 0 | Status: COMPLETED | COMMUNITY
Start: 2018-01-10 | End: 2018-01-17

## 2018-01-21 ENCOUNTER — FORM ENCOUNTER (OUTPATIENT)
Age: 83
End: 2018-01-21

## 2018-01-22 ENCOUNTER — APPOINTMENT (OUTPATIENT)
Dept: FAMILY MEDICINE | Facility: CLINIC | Age: 83
End: 2018-01-22
Payer: MEDICARE

## 2018-01-22 ENCOUNTER — APPOINTMENT (OUTPATIENT)
Dept: RADIOLOGY | Facility: CLINIC | Age: 83
End: 2018-01-22
Payer: MEDICARE

## 2018-01-22 ENCOUNTER — OUTPATIENT (OUTPATIENT)
Dept: OUTPATIENT SERVICES | Facility: HOSPITAL | Age: 83
LOS: 1 days | End: 2018-01-22
Payer: MEDICARE

## 2018-01-22 VITALS
BODY MASS INDEX: 27.31 KG/M2 | DIASTOLIC BLOOD PRESSURE: 58 MMHG | WEIGHT: 160 LBS | SYSTOLIC BLOOD PRESSURE: 100 MMHG | HEIGHT: 64 IN

## 2018-01-22 DIAGNOSIS — Z90.721 ACQUIRED ABSENCE OF OVARIES, UNILATERAL: Chronic | ICD-10-CM

## 2018-01-22 DIAGNOSIS — H91.93 UNSPECIFIED HEARING LOSS, BILATERAL: ICD-10-CM

## 2018-01-22 DIAGNOSIS — Z90.49 ACQUIRED ABSENCE OF OTHER SPECIFIED PARTS OF DIGESTIVE TRACT: Chronic | ICD-10-CM

## 2018-01-22 DIAGNOSIS — C50.919 MALIGNANT NEOPLASM OF UNSPECIFIED SITE OF UNSPECIFIED FEMALE BREAST: Chronic | ICD-10-CM

## 2018-01-22 DIAGNOSIS — Z41.9 ENCOUNTER FOR PROCEDURE FOR PURPOSES OTHER THAN REMEDYING HEALTH STATE, UNSPECIFIED: Chronic | ICD-10-CM

## 2018-01-22 DIAGNOSIS — Z00.8 ENCOUNTER FOR OTHER GENERAL EXAMINATION: ICD-10-CM

## 2018-01-22 DIAGNOSIS — J06.9 ACUTE UPPER RESPIRATORY INFECTION, UNSPECIFIED: ICD-10-CM

## 2018-01-22 DIAGNOSIS — Z90.89 ACQUIRED ABSENCE OF OTHER ORGANS: Chronic | ICD-10-CM

## 2018-01-22 PROCEDURE — 71046 X-RAY EXAM CHEST 2 VIEWS: CPT

## 2018-01-22 PROCEDURE — 92567 TYMPANOMETRY: CPT | Mod: 59

## 2018-01-22 PROCEDURE — 99213 OFFICE O/P EST LOW 20 MIN: CPT | Mod: 25

## 2018-01-22 PROCEDURE — 71046 X-RAY EXAM CHEST 2 VIEWS: CPT | Mod: 26

## 2018-01-24 ENCOUNTER — RX RENEWAL (OUTPATIENT)
Age: 83
End: 2018-01-24

## 2018-01-24 ENCOUNTER — FORM ENCOUNTER (OUTPATIENT)
Age: 83
End: 2018-01-24

## 2018-01-24 DIAGNOSIS — R91.1 SOLITARY PULMONARY NODULE: ICD-10-CM

## 2018-01-25 ENCOUNTER — OUTPATIENT (OUTPATIENT)
Dept: OUTPATIENT SERVICES | Facility: HOSPITAL | Age: 83
LOS: 1 days | End: 2018-01-25
Payer: MEDICARE

## 2018-01-25 ENCOUNTER — APPOINTMENT (OUTPATIENT)
Dept: CT IMAGING | Facility: CLINIC | Age: 83
End: 2018-01-25
Payer: MEDICARE

## 2018-01-25 DIAGNOSIS — Z00.8 ENCOUNTER FOR OTHER GENERAL EXAMINATION: ICD-10-CM

## 2018-01-25 DIAGNOSIS — Z90.721 ACQUIRED ABSENCE OF OVARIES, UNILATERAL: Chronic | ICD-10-CM

## 2018-01-25 DIAGNOSIS — C50.919 MALIGNANT NEOPLASM OF UNSPECIFIED SITE OF UNSPECIFIED FEMALE BREAST: Chronic | ICD-10-CM

## 2018-01-25 DIAGNOSIS — Z90.89 ACQUIRED ABSENCE OF OTHER ORGANS: Chronic | ICD-10-CM

## 2018-01-25 DIAGNOSIS — Z90.49 ACQUIRED ABSENCE OF OTHER SPECIFIED PARTS OF DIGESTIVE TRACT: Chronic | ICD-10-CM

## 2018-01-25 DIAGNOSIS — Z41.9 ENCOUNTER FOR PROCEDURE FOR PURPOSES OTHER THAN REMEDYING HEALTH STATE, UNSPECIFIED: Chronic | ICD-10-CM

## 2018-01-25 PROCEDURE — 71250 CT THORAX DX C-: CPT

## 2018-01-25 PROCEDURE — 71250 CT THORAX DX C-: CPT | Mod: 26

## 2018-02-15 ENCOUNTER — RX RENEWAL (OUTPATIENT)
Age: 83
End: 2018-02-15

## 2018-03-01 ENCOUNTER — RX RENEWAL (OUTPATIENT)
Age: 83
End: 2018-03-01

## 2018-03-01 RX ORDER — BRIMONIDINE TARTRATE, TIMOLOL MALEATE 2; 5 MG/ML; MG/ML
0.2-0.5 SOLUTION/ DROPS OPHTHALMIC
Qty: 1 | Refills: 2 | Status: ACTIVE | COMMUNITY
Start: 1900-01-01 | End: 1900-01-01

## 2018-03-07 ENCOUNTER — RX RENEWAL (OUTPATIENT)
Age: 83
End: 2018-03-07

## 2018-03-09 ENCOUNTER — RX RENEWAL (OUTPATIENT)
Age: 83
End: 2018-03-09

## 2018-03-16 ENCOUNTER — RX RENEWAL (OUTPATIENT)
Age: 83
End: 2018-03-16

## 2018-03-26 ENCOUNTER — RX RENEWAL (OUTPATIENT)
Age: 83
End: 2018-03-26

## 2018-03-26 DIAGNOSIS — R39.9 UNSPECIFIED SYMPTOMS AND SIGNS INVOLVING THE GENITOURINARY SYSTEM: ICD-10-CM

## 2018-04-03 ENCOUNTER — FORM ENCOUNTER (OUTPATIENT)
Age: 83
End: 2018-04-03

## 2018-04-04 ENCOUNTER — APPOINTMENT (OUTPATIENT)
Dept: RADIOLOGY | Facility: CLINIC | Age: 83
End: 2018-04-04
Payer: MEDICARE

## 2018-04-04 ENCOUNTER — OUTPATIENT (OUTPATIENT)
Dept: OUTPATIENT SERVICES | Facility: HOSPITAL | Age: 83
LOS: 1 days | End: 2018-04-04
Payer: MEDICARE

## 2018-04-04 ENCOUNTER — APPOINTMENT (OUTPATIENT)
Dept: FAMILY MEDICINE | Facility: CLINIC | Age: 83
End: 2018-04-04
Payer: MEDICARE

## 2018-04-04 VITALS
WEIGHT: 160 LBS | BODY MASS INDEX: 27.31 KG/M2 | HEIGHT: 64 IN | SYSTOLIC BLOOD PRESSURE: 110 MMHG | DIASTOLIC BLOOD PRESSURE: 52 MMHG

## 2018-04-04 DIAGNOSIS — C50.919 MALIGNANT NEOPLASM OF UNSPECIFIED SITE OF UNSPECIFIED FEMALE BREAST: Chronic | ICD-10-CM

## 2018-04-04 DIAGNOSIS — Z00.8 ENCOUNTER FOR OTHER GENERAL EXAMINATION: ICD-10-CM

## 2018-04-04 DIAGNOSIS — Z01.810 ENCOUNTER FOR PREPROCEDURAL CARDIOVASCULAR EXAMINATION: ICD-10-CM

## 2018-04-04 DIAGNOSIS — Z90.49 ACQUIRED ABSENCE OF OTHER SPECIFIED PARTS OF DIGESTIVE TRACT: Chronic | ICD-10-CM

## 2018-04-04 DIAGNOSIS — Z41.9 ENCOUNTER FOR PROCEDURE FOR PURPOSES OTHER THAN REMEDYING HEALTH STATE, UNSPECIFIED: Chronic | ICD-10-CM

## 2018-04-04 DIAGNOSIS — H26.9 UNSPECIFIED CATARACT: ICD-10-CM

## 2018-04-04 DIAGNOSIS — Z90.721 ACQUIRED ABSENCE OF OVARIES, UNILATERAL: Chronic | ICD-10-CM

## 2018-04-04 DIAGNOSIS — Z90.89 ACQUIRED ABSENCE OF OTHER ORGANS: Chronic | ICD-10-CM

## 2018-04-04 LAB
BILIRUB UR QL STRIP: 0
CLARITY UR: NORMAL
COLLECTION METHOD: NORMAL
GLUCOSE UR-MCNC: 0
HCG UR QL: 0.2 EU/DL
HGB UR QL STRIP.AUTO: NORMAL
KETONES UR-MCNC: 0
LEUKOCYTE ESTERASE UR QL STRIP: NORMAL
NITRITE UR QL STRIP: 0
PH UR STRIP: 5.5
PROT UR STRIP-MCNC: 0
SP GR UR STRIP: 1.01

## 2018-04-04 PROCEDURE — 71046 X-RAY EXAM CHEST 2 VIEWS: CPT | Mod: 26

## 2018-04-04 PROCEDURE — 71046 X-RAY EXAM CHEST 2 VIEWS: CPT

## 2018-04-04 PROCEDURE — 99214 OFFICE O/P EST MOD 30 MIN: CPT

## 2018-04-06 LAB — BACTERIA UR CULT: NORMAL

## 2018-04-10 ENCOUNTER — TRANSCRIPTION ENCOUNTER (OUTPATIENT)
Age: 83
End: 2018-04-10

## 2018-04-10 NOTE — ASU PATIENT PROFILE, ADULT - ABILITY TO HEAR (WITH HEARING AID OR HEARING APPLIANCE IF NORMALLY USED):
Mildly to Moderately Impaired: difficulty hearing in some environments or speaker may need to increase volume or speak distinctly/uses hearing aids at home

## 2018-04-11 ENCOUNTER — OUTPATIENT (OUTPATIENT)
Dept: OUTPATIENT SERVICES | Facility: HOSPITAL | Age: 83
LOS: 1 days | End: 2018-04-11
Payer: MEDICARE

## 2018-04-11 VITALS
RESPIRATION RATE: 20 BRPM | DIASTOLIC BLOOD PRESSURE: 84 MMHG | TEMPERATURE: 99 F | OXYGEN SATURATION: 95 % | WEIGHT: 157.85 LBS | HEIGHT: 63 IN | SYSTOLIC BLOOD PRESSURE: 129 MMHG | HEART RATE: 59 BPM

## 2018-04-11 VITALS
DIASTOLIC BLOOD PRESSURE: 45 MMHG | HEART RATE: 58 BPM | SYSTOLIC BLOOD PRESSURE: 128 MMHG | RESPIRATION RATE: 18 BRPM | OXYGEN SATURATION: 93 %

## 2018-04-11 DIAGNOSIS — C50.919 MALIGNANT NEOPLASM OF UNSPECIFIED SITE OF UNSPECIFIED FEMALE BREAST: Chronic | ICD-10-CM

## 2018-04-11 DIAGNOSIS — H25.12 AGE-RELATED NUCLEAR CATARACT, LEFT EYE: ICD-10-CM

## 2018-04-11 DIAGNOSIS — Z41.9 ENCOUNTER FOR PROCEDURE FOR PURPOSES OTHER THAN REMEDYING HEALTH STATE, UNSPECIFIED: Chronic | ICD-10-CM

## 2018-04-11 DIAGNOSIS — Z90.721 ACQUIRED ABSENCE OF OVARIES, UNILATERAL: Chronic | ICD-10-CM

## 2018-04-11 DIAGNOSIS — Z90.49 ACQUIRED ABSENCE OF OTHER SPECIFIED PARTS OF DIGESTIVE TRACT: Chronic | ICD-10-CM

## 2018-04-11 DIAGNOSIS — Z90.89 ACQUIRED ABSENCE OF OTHER ORGANS: Chronic | ICD-10-CM

## 2018-04-11 LAB — GLUCOSE BLDC GLUCOMTR-MCNC: 152 MG/DL — HIGH (ref 70–99)

## 2018-04-11 PROCEDURE — 66984 XCAPSL CTRC RMVL W/O ECP: CPT | Mod: LT

## 2018-04-11 PROCEDURE — C1780: CPT

## 2018-04-11 PROCEDURE — 82962 GLUCOSE BLOOD TEST: CPT

## 2018-04-12 ENCOUNTER — RX RENEWAL (OUTPATIENT)
Age: 83
End: 2018-04-12

## 2018-04-16 ENCOUNTER — RX RENEWAL (OUTPATIENT)
Age: 83
End: 2018-04-16

## 2018-05-01 ENCOUNTER — RX RENEWAL (OUTPATIENT)
Age: 83
End: 2018-05-01

## 2018-05-07 ENCOUNTER — RX RENEWAL (OUTPATIENT)
Age: 83
End: 2018-05-07

## 2018-05-14 ENCOUNTER — RX RENEWAL (OUTPATIENT)
Age: 83
End: 2018-05-14

## 2018-05-16 ENCOUNTER — RX RENEWAL (OUTPATIENT)
Age: 83
End: 2018-05-16

## 2018-05-30 ENCOUNTER — RX RENEWAL (OUTPATIENT)
Age: 83
End: 2018-05-30

## 2018-06-11 ENCOUNTER — RX RENEWAL (OUTPATIENT)
Age: 83
End: 2018-06-11

## 2018-07-04 ENCOUNTER — RX RENEWAL (OUTPATIENT)
Age: 83
End: 2018-07-04

## 2018-07-05 ENCOUNTER — EMERGENCY (EMERGENCY)
Facility: HOSPITAL | Age: 83
LOS: 0 days | Discharge: HOME | End: 2018-07-05
Attending: EMERGENCY MEDICINE | Admitting: EMERGENCY MEDICINE
Payer: MEDICARE

## 2018-07-05 VITALS
HEART RATE: 63 BPM | OXYGEN SATURATION: 100 % | TEMPERATURE: 99 F | DIASTOLIC BLOOD PRESSURE: 51 MMHG | WEIGHT: 145.06 LBS | SYSTOLIC BLOOD PRESSURE: 139 MMHG | RESPIRATION RATE: 18 BRPM | HEIGHT: 65 IN

## 2018-07-05 DIAGNOSIS — Z90.89 ACQUIRED ABSENCE OF OTHER ORGANS: Chronic | ICD-10-CM

## 2018-07-05 DIAGNOSIS — Z41.9 ENCOUNTER FOR PROCEDURE FOR PURPOSES OTHER THAN REMEDYING HEALTH STATE, UNSPECIFIED: Chronic | ICD-10-CM

## 2018-07-05 DIAGNOSIS — C50.919 MALIGNANT NEOPLASM OF UNSPECIFIED SITE OF UNSPECIFIED FEMALE BREAST: Chronic | ICD-10-CM

## 2018-07-05 DIAGNOSIS — Z90.49 ACQUIRED ABSENCE OF OTHER SPECIFIED PARTS OF DIGESTIVE TRACT: Chronic | ICD-10-CM

## 2018-07-05 DIAGNOSIS — Z90.721 ACQUIRED ABSENCE OF OVARIES, UNILATERAL: Chronic | ICD-10-CM

## 2018-07-05 LAB
ALBUMIN SERPL ELPH-MCNC: 3.2 G/DL — LOW (ref 3.3–5)
ALP SERPL-CCNC: 82 U/L — SIGNIFICANT CHANGE UP (ref 40–120)
ALT FLD-CCNC: 14 U/L — SIGNIFICANT CHANGE UP (ref 12–78)
ANION GAP SERPL CALC-SCNC: 7 MMOL/L — SIGNIFICANT CHANGE UP (ref 5–17)
AST SERPL-CCNC: 15 U/L — SIGNIFICANT CHANGE UP (ref 15–37)
BASOPHILS # BLD AUTO: 0.02 K/UL — SIGNIFICANT CHANGE UP (ref 0–0.2)
BASOPHILS NFR BLD AUTO: 0.3 % — SIGNIFICANT CHANGE UP (ref 0–2)
BILIRUB SERPL-MCNC: 0.2 MG/DL — SIGNIFICANT CHANGE UP (ref 0.2–1.2)
BUN SERPL-MCNC: 26 MG/DL — HIGH (ref 7–23)
CALCIUM SERPL-MCNC: 8.6 MG/DL — SIGNIFICANT CHANGE UP (ref 8.5–10.1)
CHLORIDE SERPL-SCNC: 107 MMOL/L — SIGNIFICANT CHANGE UP (ref 96–108)
CO2 SERPL-SCNC: 26 MMOL/L — SIGNIFICANT CHANGE UP (ref 22–31)
CREAT SERPL-MCNC: 1.14 MG/DL — SIGNIFICANT CHANGE UP (ref 0.5–1.3)
EOSINOPHIL # BLD AUTO: 0.31 K/UL — SIGNIFICANT CHANGE UP (ref 0–0.5)
EOSINOPHIL NFR BLD AUTO: 4 % — SIGNIFICANT CHANGE UP (ref 0–6)
GLUCOSE SERPL-MCNC: 212 MG/DL — HIGH (ref 70–99)
HCT VFR BLD CALC: 33.9 % — LOW (ref 34.5–45)
HGB BLD-MCNC: 11.7 G/DL — SIGNIFICANT CHANGE UP (ref 11.5–15.5)
IMM GRANULOCYTES NFR BLD AUTO: 0.8 % — SIGNIFICANT CHANGE UP (ref 0–1.5)
LYMPHOCYTES # BLD AUTO: 1.21 K/UL — SIGNIFICANT CHANGE UP (ref 1–3.3)
LYMPHOCYTES # BLD AUTO: 15.5 % — SIGNIFICANT CHANGE UP (ref 13–44)
MCHC RBC-ENTMCNC: 32.2 PG — SIGNIFICANT CHANGE UP (ref 27–34)
MCHC RBC-ENTMCNC: 34.5 GM/DL — SIGNIFICANT CHANGE UP (ref 32–36)
MCV RBC AUTO: 93.4 FL — SIGNIFICANT CHANGE UP (ref 80–100)
MONOCYTES # BLD AUTO: 0.67 K/UL — SIGNIFICANT CHANGE UP (ref 0–0.9)
MONOCYTES NFR BLD AUTO: 8.6 % — SIGNIFICANT CHANGE UP (ref 2–14)
NEUTROPHILS # BLD AUTO: 5.54 K/UL — SIGNIFICANT CHANGE UP (ref 1.8–7.4)
NEUTROPHILS NFR BLD AUTO: 70.8 % — SIGNIFICANT CHANGE UP (ref 43–77)
NRBC # BLD: 0 /100 WBCS — SIGNIFICANT CHANGE UP (ref 0–0)
PLATELET # BLD AUTO: 151 K/UL — SIGNIFICANT CHANGE UP (ref 150–400)
POTASSIUM SERPL-MCNC: 4.1 MMOL/L — SIGNIFICANT CHANGE UP (ref 3.5–5.3)
POTASSIUM SERPL-SCNC: 4.1 MMOL/L — SIGNIFICANT CHANGE UP (ref 3.5–5.3)
PROT SERPL-MCNC: 6.9 GM/DL — SIGNIFICANT CHANGE UP (ref 6–8.3)
RBC # BLD: 3.63 M/UL — LOW (ref 3.8–5.2)
RBC # FLD: 12.1 % — SIGNIFICANT CHANGE UP (ref 10.3–14.5)
SODIUM SERPL-SCNC: 140 MMOL/L — SIGNIFICANT CHANGE UP (ref 135–145)
WBC # BLD: 7.81 K/UL — SIGNIFICANT CHANGE UP (ref 3.8–10.5)
WBC # FLD AUTO: 7.81 K/UL — SIGNIFICANT CHANGE UP (ref 3.8–10.5)

## 2018-07-05 PROCEDURE — 73060 X-RAY EXAM OF HUMERUS: CPT | Mod: 26,LT

## 2018-07-05 PROCEDURE — 99284 EMERGENCY DEPT VISIT MOD MDM: CPT

## 2018-07-05 PROCEDURE — 72125 CT NECK SPINE W/O DYE: CPT | Mod: 26

## 2018-07-05 PROCEDURE — 70450 CT HEAD/BRAIN W/O DYE: CPT | Mod: 26

## 2018-07-05 PROCEDURE — 73030 X-RAY EXAM OF SHOULDER: CPT | Mod: 26,LT

## 2018-07-05 PROCEDURE — 71045 X-RAY EXAM CHEST 1 VIEW: CPT | Mod: 26

## 2018-07-05 RX ORDER — ACETAMINOPHEN 500 MG
975 TABLET ORAL ONCE
Qty: 0 | Refills: 0 | Status: COMPLETED | OUTPATIENT
Start: 2018-07-05 | End: 2018-07-05

## 2018-07-05 RX ADMIN — Medication 975 MILLIGRAM(S): at 22:07

## 2018-07-05 NOTE — ED ADULT NURSE NOTE - OBJECTIVE STATEMENT
Pt presented to ED s/p fall. As per pt, pt's a resident at [facility], and fell down while ambulating w/ walker. Pt stated of trying to open the bathroom door with the walker, lost balance, fell toward left side, but wall broke the fall. Denies LOC. Upon arrival to ED, pt GCS of 15. C/O pain to left side of face, neck, and left shoulder. Denies numbness or tingling sensation. No other complaints.

## 2018-07-05 NOTE — ED PROVIDER NOTE - OBJECTIVE STATEMENT
89 y/o female with a PMHx of DM, HTN, HLD, DVT, breast cancer (right) shoulder fx right and glaucoma presents to the ED s/p fall. Pt fell against the wall after trying to open a heavy door. Pt reports right shoulder pain and headache. Denies CP, abd pain, and N/V/D. Nonsmoker. No ETOH use. No illicit drug use. NKDA. 89 y/o female with a PMHx of DM, HTN, HLD, DVT, breast cancer (right) shoulder fx right and glaucoma presents to the ED s/p fall. Pt fell against the wall after trying to open a heavy door. Pt reports right shoulder pain and headache. Denies CP, abd pain, and N/V/D. Nonsmoker. No ETOH use. No illicit drug use. 89 y/o female with a PMHx of DM, HTN, HLD, DVT, breast cancer (right) shoulder fx right and glaucoma presents to the ED s/p fall. Pt fell against the wall after trying to open a heavy door. Hit wall w/ L shoulder and L side of head then slid down wall.  Pt reports right shoulder pain and headache. Denies CP, abd pain, and N/V/D. Nonsmoker. No ETOH use. No illicit drug use. 89 y/o female with a PMHx of DM, HTN, HLD, DVT, breast cancer (right) shoulder fx right and glaucoma presents to the ED s/p fall. Pt fell against the wall after trying to open a heavy door. Hit wall w/ L shoulder and L side of head then slid down wall.  Pt reports L shoulder pain and headache. Denies CP, abd pain, SOB, or N/V/D. Nonsmoker. No ETOH use. No illicit drug use.

## 2018-07-05 NOTE — ED PROVIDER NOTE - PROGRESS NOTE DETAILS
pt ambulates w/ assistance to baseline.  placed in sling and advised to f/u w/ PMD and ortho.  Going in care of son to NH

## 2018-07-05 NOTE — ED ADULT NURSE NOTE - CHPI ED SYMPTOMS NEG
no chills/no tingling/no numbness/no dizziness/no fever/no decreased eating/drinking/no nausea/no vomiting/no weakness

## 2018-07-05 NOTE — ED PROVIDER NOTE - MUSCULOSKELETAL, MLM
Left sided paraspinal tenderness and limited ROM to right shoulder. Left sided paraspinal tenderness and limited ROM to L shoulder.

## 2018-07-05 NOTE — ED PROVIDER NOTE - SKIN, MLM
Skin normal color for race, warm, dry and intact. No evidence of rash. Petechiae to left hand. Petechiae appearing lesion minimally to L hand

## 2018-07-05 NOTE — ED PROVIDER NOTE - NS_ ATTENDINGSCRIBEDETAILS _ED_A_ED_FT
The scribe's documentation has been prepared under my direction and personally reviewed by me in its entirety. I confirm that the note above accurately reflects all work, treatment, procedures, and medical decision-making performed by me.  Micah Lee MD

## 2018-07-07 DIAGNOSIS — Z86.718 PERSONAL HISTORY OF OTHER VENOUS THROMBOSIS AND EMBOLISM: ICD-10-CM

## 2018-07-07 DIAGNOSIS — S49.92XA UNSPECIFIED INJURY OF LEFT SHOULDER AND UPPER ARM, INITIAL ENCOUNTER: ICD-10-CM

## 2018-07-07 DIAGNOSIS — I10 ESSENTIAL (PRIMARY) HYPERTENSION: ICD-10-CM
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
## 2018-07-07 DIAGNOSIS — Z79.4 LONG TERM (CURRENT) USE OF INSULIN: ICD-10-CM

## 2018-07-07 DIAGNOSIS — G89.11 ACUTE PAIN DUE TO TRAUMA: ICD-10-CM

## 2018-07-07 DIAGNOSIS — I25.10 ATHEROSCLEROTIC HEART DISEASE OF NATIVE CORONARY ARTERY WITHOUT ANGINA PECTORIS: ICD-10-CM

## 2018-07-07 DIAGNOSIS — E78.5 HYPERLIPIDEMIA, UNSPECIFIED: ICD-10-CM

## 2018-07-07 DIAGNOSIS — S09.90XA UNSPECIFIED INJURY OF HEAD, INITIAL ENCOUNTER: ICD-10-CM

## 2018-07-07 DIAGNOSIS — M25.512 PAIN IN LEFT SHOULDER: ICD-10-CM

## 2018-07-07 DIAGNOSIS — Z79.82 LONG TERM (CURRENT) USE OF ASPIRIN: ICD-10-CM
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
## 2018-07-07 DIAGNOSIS — E10.9 TYPE 1 DIABETES MELLITUS WITHOUT COMPLICATIONS: ICD-10-CM

## 2018-07-07 DIAGNOSIS — Z85.3 PERSONAL HISTORY OF MALIGNANT NEOPLASM OF BREAST: ICD-10-CM

## 2018-07-08 ENCOUNTER — INPATIENT (INPATIENT)
Facility: HOSPITAL | Age: 83
LOS: 2 days | Discharge: NURSING, REHAB OR LONG-TERM CARE | End: 2018-07-11
Attending: FAMILY MEDICINE | Admitting: FAMILY MEDICINE
Payer: MEDICARE

## 2018-07-08 VITALS
OXYGEN SATURATION: 100 % | SYSTOLIC BLOOD PRESSURE: 181 MMHG | TEMPERATURE: 99 F | HEART RATE: 62 BPM | DIASTOLIC BLOOD PRESSURE: 63 MMHG | RESPIRATION RATE: 18 BRPM | WEIGHT: 149.91 LBS

## 2018-07-08 DIAGNOSIS — Z90.721 ACQUIRED ABSENCE OF OVARIES, UNILATERAL: Chronic | ICD-10-CM

## 2018-07-08 DIAGNOSIS — Z90.49 ACQUIRED ABSENCE OF OTHER SPECIFIED PARTS OF DIGESTIVE TRACT: Chronic | ICD-10-CM

## 2018-07-08 DIAGNOSIS — C50.919 MALIGNANT NEOPLASM OF UNSPECIFIED SITE OF UNSPECIFIED FEMALE BREAST: Chronic | ICD-10-CM

## 2018-07-08 DIAGNOSIS — Z90.89 ACQUIRED ABSENCE OF OTHER ORGANS: Chronic | ICD-10-CM

## 2018-07-08 DIAGNOSIS — Z41.9 ENCOUNTER FOR PROCEDURE FOR PURPOSES OTHER THAN REMEDYING HEALTH STATE, UNSPECIFIED: Chronic | ICD-10-CM

## 2018-07-08 LAB
ALBUMIN SERPL ELPH-MCNC: 3.3 G/DL — SIGNIFICANT CHANGE UP (ref 3.3–5)
ALP SERPL-CCNC: 79 U/L — SIGNIFICANT CHANGE UP (ref 40–120)
ALT FLD-CCNC: 12 U/L — SIGNIFICANT CHANGE UP (ref 12–78)
ANION GAP SERPL CALC-SCNC: 4 MMOL/L — LOW (ref 5–17)
APPEARANCE UR: ABNORMAL
AST SERPL-CCNC: 12 U/L — LOW (ref 15–37)
BACTERIA # UR AUTO: ABNORMAL
BASOPHILS # BLD AUTO: 0.03 K/UL — SIGNIFICANT CHANGE UP (ref 0–0.2)
BASOPHILS NFR BLD AUTO: 0.4 % — SIGNIFICANT CHANGE UP (ref 0–2)
BILIRUB SERPL-MCNC: 0.6 MG/DL — SIGNIFICANT CHANGE UP (ref 0.2–1.2)
BILIRUB UR-MCNC: NEGATIVE — SIGNIFICANT CHANGE UP
BUN SERPL-MCNC: 21 MG/DL — SIGNIFICANT CHANGE UP (ref 7–23)
CALCIUM SERPL-MCNC: 8.8 MG/DL — SIGNIFICANT CHANGE UP (ref 8.5–10.1)
CHLORIDE SERPL-SCNC: 100 MMOL/L — SIGNIFICANT CHANGE UP (ref 96–108)
CO2 SERPL-SCNC: 29 MMOL/L — SIGNIFICANT CHANGE UP (ref 22–31)
COLOR SPEC: YELLOW — SIGNIFICANT CHANGE UP
CREAT SERPL-MCNC: 0.97 MG/DL — SIGNIFICANT CHANGE UP (ref 0.5–1.3)
DIFF PNL FLD: ABNORMAL
EOSINOPHIL # BLD AUTO: 0.31 K/UL — SIGNIFICANT CHANGE UP (ref 0–0.5)
EOSINOPHIL NFR BLD AUTO: 4 % — SIGNIFICANT CHANGE UP (ref 0–6)
EPI CELLS # UR: ABNORMAL
GLUCOSE BLDC GLUCOMTR-MCNC: 120 MG/DL — HIGH (ref 70–99)
GLUCOSE SERPL-MCNC: 106 MG/DL — HIGH (ref 70–99)
GLUCOSE UR QL: NEGATIVE MG/DL — SIGNIFICANT CHANGE UP
HCT VFR BLD CALC: 34.7 % — SIGNIFICANT CHANGE UP (ref 34.5–45)
HGB BLD-MCNC: 12.3 G/DL — SIGNIFICANT CHANGE UP (ref 11.5–15.5)
IMM GRANULOCYTES NFR BLD AUTO: 0.4 % — SIGNIFICANT CHANGE UP (ref 0–1.5)
KETONES UR-MCNC: NEGATIVE — SIGNIFICANT CHANGE UP
LEUKOCYTE ESTERASE UR-ACNC: ABNORMAL
LYMPHOCYTES # BLD AUTO: 1.17 K/UL — SIGNIFICANT CHANGE UP (ref 1–3.3)
LYMPHOCYTES # BLD AUTO: 15.1 % — SIGNIFICANT CHANGE UP (ref 13–44)
MCHC RBC-ENTMCNC: 32.4 PG — SIGNIFICANT CHANGE UP (ref 27–34)
MCHC RBC-ENTMCNC: 35.4 GM/DL — SIGNIFICANT CHANGE UP (ref 32–36)
MCV RBC AUTO: 91.3 FL — SIGNIFICANT CHANGE UP (ref 80–100)
MONOCYTES # BLD AUTO: 0.82 K/UL — SIGNIFICANT CHANGE UP (ref 0–0.9)
MONOCYTES NFR BLD AUTO: 10.6 % — SIGNIFICANT CHANGE UP (ref 2–14)
NEUTROPHILS # BLD AUTO: 5.37 K/UL — SIGNIFICANT CHANGE UP (ref 1.8–7.4)
NEUTROPHILS NFR BLD AUTO: 69.5 % — SIGNIFICANT CHANGE UP (ref 43–77)
NITRITE UR-MCNC: NEGATIVE — SIGNIFICANT CHANGE UP
NRBC # BLD: 0 /100 WBCS — SIGNIFICANT CHANGE UP (ref 0–0)
PH UR: 6 — SIGNIFICANT CHANGE UP (ref 5–8)
PLATELET # BLD AUTO: 149 K/UL — LOW (ref 150–400)
POTASSIUM SERPL-MCNC: 4.1 MMOL/L — SIGNIFICANT CHANGE UP (ref 3.5–5.3)
POTASSIUM SERPL-SCNC: 4.1 MMOL/L — SIGNIFICANT CHANGE UP (ref 3.5–5.3)
PROT SERPL-MCNC: 7.4 GM/DL — SIGNIFICANT CHANGE UP (ref 6–8.3)
PROT UR-MCNC: 30 MG/DL
RBC # BLD: 3.8 M/UL — SIGNIFICANT CHANGE UP (ref 3.8–5.2)
RBC # FLD: 11.9 % — SIGNIFICANT CHANGE UP (ref 10.3–14.5)
RBC CASTS # UR COMP ASSIST: SIGNIFICANT CHANGE UP /HPF (ref 0–4)
SODIUM SERPL-SCNC: 133 MMOL/L — LOW (ref 135–145)
SP GR SPEC: 1.01 — SIGNIFICANT CHANGE UP (ref 1.01–1.02)
UROBILINOGEN FLD QL: NEGATIVE MG/DL — SIGNIFICANT CHANGE UP
WBC # BLD: 7.73 K/UL — SIGNIFICANT CHANGE UP (ref 3.8–10.5)
WBC # FLD AUTO: 7.73 K/UL — SIGNIFICANT CHANGE UP (ref 3.8–10.5)
WBC UR QL: ABNORMAL

## 2018-07-08 PROCEDURE — 99285 EMERGENCY DEPT VISIT HI MDM: CPT

## 2018-07-08 PROCEDURE — 71045 X-RAY EXAM CHEST 1 VIEW: CPT | Mod: 26

## 2018-07-08 PROCEDURE — 93010 ELECTROCARDIOGRAM REPORT: CPT

## 2018-07-08 PROCEDURE — 73502 X-RAY EXAM HIP UNI 2-3 VIEWS: CPT | Mod: 26

## 2018-07-08 PROCEDURE — 72192 CT PELVIS W/O DYE: CPT | Mod: 26

## 2018-07-08 PROCEDURE — 72131 CT LUMBAR SPINE W/O DYE: CPT | Mod: 26

## 2018-07-08 PROCEDURE — 72100 X-RAY EXAM L-S SPINE 2/3 VWS: CPT | Mod: 26

## 2018-07-08 RX ORDER — ONDANSETRON 8 MG/1
4 TABLET, FILM COATED ORAL ONCE
Qty: 0 | Refills: 0 | Status: COMPLETED | OUTPATIENT
Start: 2018-07-08 | End: 2018-07-08

## 2018-07-08 RX ORDER — CEFTRIAXONE 500 MG/1
1000 INJECTION, POWDER, FOR SOLUTION INTRAMUSCULAR; INTRAVENOUS EVERY 24 HOURS
Qty: 0 | Refills: 0 | Status: DISCONTINUED | OUTPATIENT
Start: 2018-07-08 | End: 2018-07-10

## 2018-07-08 RX ORDER — ACETAMINOPHEN 500 MG
1000 TABLET ORAL ONCE
Qty: 0 | Refills: 0 | Status: COMPLETED | OUTPATIENT
Start: 2018-07-08 | End: 2018-07-08

## 2018-07-08 RX ORDER — CEFTRIAXONE 500 MG/1
1 INJECTION, POWDER, FOR SOLUTION INTRAMUSCULAR; INTRAVENOUS EVERY 24 HOURS
Qty: 0 | Refills: 0 | Status: DISCONTINUED | OUTPATIENT
Start: 2018-07-08 | End: 2018-07-08

## 2018-07-08 RX ORDER — MORPHINE SULFATE 50 MG/1
2 CAPSULE, EXTENDED RELEASE ORAL ONCE
Qty: 0 | Refills: 0 | Status: DISCONTINUED | OUTPATIENT
Start: 2018-07-08 | End: 2018-07-08

## 2018-07-08 RX ADMIN — Medication 1000 MILLIGRAM(S): at 17:23

## 2018-07-08 RX ADMIN — MORPHINE SULFATE 2 MILLIGRAM(S): 50 CAPSULE, EXTENDED RELEASE ORAL at 20:34

## 2018-07-08 RX ADMIN — ONDANSETRON 4 MILLIGRAM(S): 8 TABLET, FILM COATED ORAL at 20:34

## 2018-07-08 RX ADMIN — CEFTRIAXONE 1000 MILLIGRAM(S): 500 INJECTION, POWDER, FOR SOLUTION INTRAMUSCULAR; INTRAVENOUS at 21:10

## 2018-07-08 NOTE — ED ADULT NURSE NOTE - OBJECTIVE STATEMENT
presents to the ED s/p fall 7/5 and was seen in the ED and workup was negative. now having left hip/groin pain. states that she didn't have that pain immediately after the fall. also c/o left shoulder pain that she has had since the fall and in improving.

## 2018-07-08 NOTE — ED PROVIDER NOTE - PROGRESS NOTE DETAILS
Xrays with no acute findings, patient having increasing pain with movement so CTs ordered, results with L2 fx, page placed to on call spine, awaiting call back.  Patient cannot ambulate safely as she is having too much pain.  She is agreeable to admission for rehab and pain control -Iglesia Dodd PA-C

## 2018-07-08 NOTE — ED PROVIDER NOTE - OBJECTIVE STATEMENT
89 y/o female with a PMHx of DM, HTN, HLD, DVT, breast cancer (right) shoulder fx right and glaucoma presents to the ED s/p fall.  Pt fell against the wall after trying to open a heavy door. She was seen here yesterday s/p fall because her L shoulder was bothering her but today she bent down and her L hip started bothering her.  She is s/p total his replacement on the left side.  She is also having some pressure with urination.  Denies CP, abd pain, SOB, or N/V/D. 89 y/o female with a PMHx of DM, HTN, HLD, glaucoma presents to the ED s/p fall.  Pt fell against the wall after trying to open a heavy door. She was seen here yesterday s/p fall because her L shoulder was bothering her but today she bent down and her L hip started bothering her.  She is s/p total his replacement on the left side.  She is also having some pressure with urination.  Denies CP, abd pain, SOB, or N/V/D.

## 2018-07-08 NOTE — ED PROVIDER NOTE - MUSCULOSKELETAL, MLM
lumbar spine with midline tenderness, L hip with tenderness, has passive ROM but pain with active ROM.  Well healed scar on L hip s/p total hip replacement

## 2018-07-08 NOTE — ED PROVIDER NOTE - ATTENDING CONTRIBUTION TO CARE
I, Liya Murphy MD, personally saw the patient with ACP.  I have personally performed a face to face diagnostic evaluation on this patient.  I have reviewed the ACP note and agree with the history, exam, and plan of care, except as noted. I, Liya Murphy MD, personally saw the patient with ACP.  I have personally performed a face to face diagnostic evaluation on this patient.  I have reviewed the ACP note and agree with the history, exam, and plan of care, except as noted. 89 year old female presents 2 days after fall with increased pain and inability to walk.   Constitutional: no distress AAOx3  Eyes: PERRLA EOMI  Mouth: MMM  Cardiac: regular rate   Resp: Lungs CTAB  GI: Abd s/nt/nd  Neuro: No focal deficits  Psych: Normal behavior  Skin: No rashes  MSK: unable to ambulate, lumbar spine with midline tenderness, L hip with tenderness, has passive ROM but pain with active ROM.  Well healed scar on L hip s/p total hip replacement  Plan is labs, urine, CTs, and likely admit  - JADE Murphy MD

## 2018-07-08 NOTE — ED PROVIDER NOTE - MEDICAL DECISION MAKING DETAILS
89 year old female with recent fall, lives at assisted living, presenting with worsening back pain and inability to walk (usually uses walker) without severe pain. Will obtain CTs and admit for physical therapy/possible rehab placement. Also found to have UTI, treated with CTX and culture sent

## 2018-07-09 LAB
CULTURE RESULTS: NO GROWTH — SIGNIFICANT CHANGE UP
GLUCOSE BLDC GLUCOMTR-MCNC: 144 MG/DL — HIGH (ref 70–99)
GLUCOSE BLDC GLUCOMTR-MCNC: 165 MG/DL — HIGH (ref 70–99)
GLUCOSE BLDC GLUCOMTR-MCNC: 204 MG/DL — HIGH (ref 70–99)
SPECIMEN SOURCE: SIGNIFICANT CHANGE UP

## 2018-07-09 PROCEDURE — 99223 1ST HOSP IP/OBS HIGH 75: CPT | Mod: AI

## 2018-07-09 RX ORDER — DEXTROSE 50 % IN WATER 50 %
12.5 SYRINGE (ML) INTRAVENOUS ONCE
Qty: 0 | Refills: 0 | Status: DISCONTINUED | OUTPATIENT
Start: 2018-07-09 | End: 2018-07-11

## 2018-07-09 RX ORDER — INSULIN GLARGINE 100 [IU]/ML
42 INJECTION, SOLUTION SUBCUTANEOUS EVERY MORNING
Qty: 0 | Refills: 0 | Status: DISCONTINUED | OUTPATIENT
Start: 2018-07-09 | End: 2018-07-11

## 2018-07-09 RX ORDER — GLUCAGON INJECTION, SOLUTION 0.5 MG/.1ML
1 INJECTION, SOLUTION SUBCUTANEOUS ONCE
Qty: 0 | Refills: 0 | Status: DISCONTINUED | OUTPATIENT
Start: 2018-07-09 | End: 2018-07-11

## 2018-07-09 RX ORDER — DEXTROSE 50 % IN WATER 50 %
25 SYRINGE (ML) INTRAVENOUS ONCE
Qty: 0 | Refills: 0 | Status: DISCONTINUED | OUTPATIENT
Start: 2018-07-09 | End: 2018-07-11

## 2018-07-09 RX ORDER — SENNA PLUS 8.6 MG/1
2 TABLET ORAL AT BEDTIME
Qty: 0 | Refills: 0 | Status: DISCONTINUED | OUTPATIENT
Start: 2018-07-09 | End: 2018-07-11

## 2018-07-09 RX ORDER — LATANOPROST 0.05 MG/ML
1 SOLUTION/ DROPS OPHTHALMIC; TOPICAL AT BEDTIME
Qty: 0 | Refills: 0 | Status: DISCONTINUED | OUTPATIENT
Start: 2018-07-09 | End: 2018-07-11

## 2018-07-09 RX ORDER — TRAMADOL HYDROCHLORIDE 50 MG/1
50 TABLET ORAL EVERY 6 HOURS
Qty: 0 | Refills: 0 | Status: DISCONTINUED | OUTPATIENT
Start: 2018-07-09 | End: 2018-07-11

## 2018-07-09 RX ORDER — DEXTROSE MONOHYDRATE, SODIUM CHLORIDE, AND POTASSIUM CHLORIDE 50; .745; 4.5 G/1000ML; G/1000ML; G/1000ML
1000 INJECTION, SOLUTION INTRAVENOUS
Qty: 0 | Refills: 0 | Status: DISCONTINUED | OUTPATIENT
Start: 2018-07-09 | End: 2018-07-11

## 2018-07-09 RX ORDER — ACETAMINOPHEN 500 MG
650 TABLET ORAL EVERY 4 HOURS
Qty: 0 | Refills: 0 | Status: DISCONTINUED | OUTPATIENT
Start: 2018-07-09 | End: 2018-07-11

## 2018-07-09 RX ORDER — DEXTROSE 50 % IN WATER 50 %
15 SYRINGE (ML) INTRAVENOUS ONCE
Qty: 0 | Refills: 0 | Status: DISCONTINUED | OUTPATIENT
Start: 2018-07-09 | End: 2018-07-11

## 2018-07-09 RX ORDER — IBUPROFEN 200 MG
600 TABLET ORAL EVERY 6 HOURS
Qty: 0 | Refills: 0 | Status: DISCONTINUED | OUTPATIENT
Start: 2018-07-09 | End: 2018-07-11

## 2018-07-09 RX ORDER — LOSARTAN POTASSIUM 100 MG/1
25 TABLET, FILM COATED ORAL DAILY
Qty: 0 | Refills: 0 | Status: DISCONTINUED | OUTPATIENT
Start: 2018-07-09 | End: 2018-07-11

## 2018-07-09 RX ORDER — HEPARIN SODIUM 5000 [USP'U]/ML
5000 INJECTION INTRAVENOUS; SUBCUTANEOUS EVERY 8 HOURS
Qty: 0 | Refills: 0 | Status: DISCONTINUED | OUTPATIENT
Start: 2018-07-09 | End: 2018-07-11

## 2018-07-09 RX ORDER — DOCUSATE SODIUM 100 MG
100 CAPSULE ORAL
Qty: 0 | Refills: 0 | Status: DISCONTINUED | OUTPATIENT
Start: 2018-07-09 | End: 2018-07-11

## 2018-07-09 RX ORDER — MORPHINE SULFATE 50 MG/1
2 CAPSULE, EXTENDED RELEASE ORAL ONCE
Qty: 0 | Refills: 0 | Status: DISCONTINUED | OUTPATIENT
Start: 2018-07-09 | End: 2018-07-09

## 2018-07-09 RX ORDER — FAMOTIDINE 10 MG/ML
20 INJECTION INTRAVENOUS DAILY
Qty: 0 | Refills: 0 | Status: DISCONTINUED | OUTPATIENT
Start: 2018-07-09 | End: 2018-07-11

## 2018-07-09 RX ORDER — SENNA PLUS 8.6 MG/1
1 TABLET ORAL
Qty: 0 | Refills: 0 | COMMUNITY

## 2018-07-09 RX ORDER — ASPIRIN/CALCIUM CARB/MAGNESIUM 324 MG
81 TABLET ORAL DAILY
Qty: 0 | Refills: 0 | Status: DISCONTINUED | OUTPATIENT
Start: 2018-07-09 | End: 2018-07-11

## 2018-07-09 RX ORDER — ZALEPLON 10 MG
5 CAPSULE ORAL AT BEDTIME
Qty: 0 | Refills: 0 | Status: DISCONTINUED | OUTPATIENT
Start: 2018-07-09 | End: 2018-07-11

## 2018-07-09 RX ORDER — ATORVASTATIN CALCIUM 80 MG/1
10 TABLET, FILM COATED ORAL AT BEDTIME
Qty: 0 | Refills: 0 | Status: DISCONTINUED | OUTPATIENT
Start: 2018-07-09 | End: 2018-07-11

## 2018-07-09 RX ORDER — LOPERAMIDE HCL 2 MG
2 TABLET ORAL EVERY 6 HOURS
Qty: 0 | Refills: 0 | Status: DISCONTINUED | OUTPATIENT
Start: 2018-07-09 | End: 2018-07-11

## 2018-07-09 RX ORDER — BRIMONIDINE TARTRATE 2 MG/MG
1 SOLUTION/ DROPS OPHTHALMIC
Qty: 0 | Refills: 0 | Status: DISCONTINUED | OUTPATIENT
Start: 2018-07-09 | End: 2018-07-11

## 2018-07-09 RX ORDER — ONDANSETRON 8 MG/1
4 TABLET, FILM COATED ORAL EVERY 6 HOURS
Qty: 0 | Refills: 0 | Status: DISCONTINUED | OUTPATIENT
Start: 2018-07-09 | End: 2018-07-11

## 2018-07-09 RX ORDER — RANITIDINE HYDROCHLORIDE 150 MG/1
1 TABLET, FILM COATED ORAL
Qty: 0 | Refills: 0 | COMMUNITY

## 2018-07-09 RX ORDER — INSULIN LISPRO 100/ML
VIAL (ML) SUBCUTANEOUS
Qty: 0 | Refills: 0 | Status: DISCONTINUED | OUTPATIENT
Start: 2018-07-09 | End: 2018-07-11

## 2018-07-09 RX ORDER — TIMOLOL 0.5 %
1 DROPS OPHTHALMIC (EYE)
Qty: 0 | Refills: 0 | Status: DISCONTINUED | OUTPATIENT
Start: 2018-07-09 | End: 2018-07-11

## 2018-07-09 RX ORDER — SODIUM CHLORIDE 9 MG/ML
1000 INJECTION, SOLUTION INTRAVENOUS
Qty: 0 | Refills: 0 | Status: DISCONTINUED | OUTPATIENT
Start: 2018-07-09 | End: 2018-07-11

## 2018-07-09 RX ADMIN — FAMOTIDINE 20 MILLIGRAM(S): 10 INJECTION INTRAVENOUS at 10:37

## 2018-07-09 RX ADMIN — BRIMONIDINE TARTRATE 1 DROP(S): 2 SOLUTION/ DROPS OPHTHALMIC at 17:02

## 2018-07-09 RX ADMIN — LATANOPROST 1 DROP(S): 0.05 SOLUTION/ DROPS OPHTHALMIC; TOPICAL at 22:30

## 2018-07-09 RX ADMIN — DEXTROSE MONOHYDRATE, SODIUM CHLORIDE, AND POTASSIUM CHLORIDE 40 MILLILITER(S): 50; .745; 4.5 INJECTION, SOLUTION INTRAVENOUS at 16:59

## 2018-07-09 RX ADMIN — Medication 81 MILLIGRAM(S): at 10:37

## 2018-07-09 RX ADMIN — Medication 1 TABLET(S): at 10:38

## 2018-07-09 RX ADMIN — CEFTRIAXONE 1000 MILLIGRAM(S): 500 INJECTION, POWDER, FOR SOLUTION INTRAMUSCULAR; INTRAVENOUS at 22:30

## 2018-07-09 RX ADMIN — HEPARIN SODIUM 5000 UNIT(S): 5000 INJECTION INTRAVENOUS; SUBCUTANEOUS at 22:30

## 2018-07-09 RX ADMIN — MORPHINE SULFATE 2 MILLIGRAM(S): 50 CAPSULE, EXTENDED RELEASE ORAL at 03:26

## 2018-07-09 RX ADMIN — Medication 2: at 17:00

## 2018-07-09 RX ADMIN — INSULIN GLARGINE 42 UNIT(S): 100 INJECTION, SOLUTION SUBCUTANEOUS at 10:53

## 2018-07-09 RX ADMIN — Medication 600 MILLIGRAM(S): at 10:38

## 2018-07-09 RX ADMIN — Medication 100 MILLIGRAM(S): at 17:02

## 2018-07-09 RX ADMIN — HEPARIN SODIUM 5000 UNIT(S): 5000 INJECTION INTRAVENOUS; SUBCUTANEOUS at 13:22

## 2018-07-09 RX ADMIN — Medication 1 DROP(S): at 17:02

## 2018-07-09 RX ADMIN — LOSARTAN POTASSIUM 25 MILLIGRAM(S): 100 TABLET, FILM COATED ORAL at 10:38

## 2018-07-09 NOTE — H&P ADULT - NSHPPHYSICALEXAM_GEN_ALL_CORE
Vital Signs Last 24 Hrs  T(C): 37 (09 Jul 2018 07:07), Max: 37.2 (08 Jul 2018 16:50)  T(F): 98.6 (09 Jul 2018 07:07), Max: 99 (09 Jul 2018 03:30)  HR: 74 (09 Jul 2018 07:07) (57 - 74)  BP: 126/53 (09 Jul 2018 07:07) (126/53 - 181/63)  BP(mean): --  RR: 18 (09 Jul 2018 07:07) (16 - 18)  SpO2: 94% (09 Jul 2018 07:07) (88% - 100%)  Constitutional: NAD, awake and alert, well-developed  HEENT: PERRLA, EOMI, Pharynx Clear  Neck: Supple, No JVD, No Lymphadenopathy  Respiratory: Breath sounds are clear bilaterally, No wheezing, rales or rhonchi  Cardiovascular: S1 and S2, regular rate and rhythm, 2/6 Murmur,  No rubs or gallops  Gastrointestinal: Bowel Sounds present, soft, nontender. No Hepatosplenomegaly. No masses  Extremities: Without clubbing, cyanosis or edema  Vascular: 2+ peripheral pulses  Neurological: A/O x 3, no focal deficits  Musculoskeletal: FROM upper and lower extremities. Pain to back on sitting and standing  Skin: No rashes

## 2018-07-09 NOTE — H&P ADULT - NSHPLABSRESULTS_GEN_ALL_CORE
12.3   7.73  )-----------( 149      ( 2018 20:27 )             34.7     133<L>  |  100  |  21  ----------------------------<  106<H>  4.1   |  29  |  0.97    Ca    8.8      2018 20:27    TPro  7.4  /  Alb  3.3  /  TBili  0.6  /  DBili  x   /  AST  12<L>  /  ALT  12  /  AlkPhos  79  07-08    Urinalysis Basic - ( 2018 17:41 )  Color: Yellow / Appearance: Slightly Turbid / S.010 / pH: x  Gluc: x / Ketone: Negative  / Bili: Negative / Urobili: Negative mg/dL   Blood: x / Protein: 30 mg/dL / Nitrite: Negative   Leuk Esterase: Moderate / RBC: 0-5 /HPF / WBC 11-25   Sq Epi: x / Non Sq Epi: Moderate / Bacteria: Few    CAPILLARY BLOOD GLUCOSE  POCT Blood Glucose.: 120 mg/dL (2018 20:29)    EKG:  Sinus bradycardia at 56 BPM    CT Lumbar Spine No Cont (18 @ 20:14)     IMPRESSION:    Acute minimal superior endplate compression deformity at L2.   No retropulsed fragments.   Chronic minimal superior endplate compression deformity at L3.  Diffuse osteopenia.

## 2018-07-09 NOTE — H&P ADULT - ASSESSMENT
89 y/o female with a PMHx of DM, HTN, Hyperlipidemia, glaucoma presents to the ED s/p fall with compression Fx and possible UTI    Assessment:  L2 Compression Fracture  Type 1 DM  Hypertension  Hyperlipidemia  Glaucoma  R/o UTI  Recent falls  Breast Cancer  Arthritis  ASHD  GERD  Macular Degeneration    Plan:  Admit to medical bed  PT evaluation and therapy  Rehab Placement  Continue Losartan  Insulin Basal and coverage  Urine and blood cultures  Rocephin empirically  Ortho Consult  Continue statin  Hold Hiprex while on antibiotics, resume on discharge  Pain control  DVT prophylaxis

## 2018-07-09 NOTE — H&P ADULT - HISTORY OF PRESENT ILLNESS
HPI:  89 y/o female with a PMHx of DM, HTN, Hyperlipidemia, glaucoma presents to the ED s/p fall.  Pt fell against the wall after trying to open a heavy door. She was seenin ED day PTA s/p fall because her L shoulder was bothering her but on day of admission  she bent down and her L hip started bothering her.  She is s/p total his replacement on the left side.  She is also having some pressure with urination.  Denies CP, abd pain, SOB, or N/V/D. Pt unable to ambulate due to back pain.      Active Problems  Arthritis (716.90) (M19.90)  Atherosclerosis of native coronary artery (414.01) (I25.10)  Bilateral hearing loss (389.9) (H91.93)  Breast cancer (174.9) (C50.919)  Cataract, bilateral (366.9) (H26.9)  GERD (gastroesophageal reflux disease) (530.81) (K21.9)  Glaucoma (365.9) (H40.9)  HTN (hypertension) (401.9) (I10)  Hyperlipidemia (272.4) (E78.5)  Influenza A (487.1) (J10.1)  Need for prophylactic vaccination and inoculation against influenza (V04.81) (Z23)  PPD positive (795.51) (R76.11)  Preop cardiovascular exam (V72.81) (Z01.810)  Pulmonary nodule (793.11) (R91.1)  Type 1 diabetes (250.01) (E10.9)  URI, acute (465.9) (J06.9)  UTI symptoms (788.99) (R39.9)  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)    Past Medical History  History of deep vein thrombosis (DVT) of lower extremity (V12.51) (Z86.718)  No pertinent past medical history    Surgical History  History of Appendectomy  History of Breast Surgery   · -Mastectomy  History of Hip Surgery   · -Open Reduction Internal Fixation (ORIF)      -Left Hip Fracture  History of Oophorectomy For Ectopic Pregnancy Left Ovary  History of Tonsillectomy    Family History  Family history of breast cancer (V16.3) (Z80.3) : Sister  Family history of leukemia (V16.6) (Z80.6) : Mother  Family history of malignant neoplasm of larynx (V16.2) (Z80.2) : Brother  Family History of prostate cancer (V16.42) (Z80.42) : Father, Brother    Social History  Former smoker (V15.82) (Z87.891)  Has 2 children  No illicit drug use  Nursing home resident (V60.6) (Z59.3)   · [facility]  Occasional alcohol use   (V61.07) (Z63.4)    Out Patient Meds:  Acidophilus CHEW; TAKE 1 CHEWABLE TABLET DAILY  Aspirin EC Lo-Dose 81 MG TBEC; TAKE 1 TABLET DAILY  Mellissa Contour Next Test In Vitro Strip; TEST 3 TIMES DAILY  Mellissa Contour Next Test STRP; TEST TWICE DAILY  Besivance 0.6 % Ophthalmic Suspension  Colace 100 MG Oral Capsule; TAKE 1 CAPSULE 3 TIMES DAILY  Combigan 0.2-0.5 % Ophthalmic Solution; INSTILL 1 DROP, TWICE DAILY  Cozaar 25 MG Oral Tablet; TAKE 1 TABLET DAILY  Durezol 0.05 % Ophthalmic Emulsion  EQ Ranitidine 75 MG Oral Tablet; Take 1 tablet twice daily  Glucagon Emergency 1 MG Injection Kit; USE AS DIRECTED  HumaLOG KwikPen 100 UNIT/ML Subcutaneous Solution Pen-injector; INJECT  SUBCUTANEOUSLY AS DIRECTED: 3 TIMES DAILY, AS PER SLIDING  SCALE  ICaps Areds 2 Oral Capsule; TAKE AS DIRECTED: TAKE 1 CAPSULE TWICE DAILY  Lipitor 10 MG Oral Tablet; TAKE 1 TABLET DAILY  Lumigan 0.01 % Ophthalmic Solution; INSTILL 1 DROP, DAILY  Melatonin 3 MG Oral Tablet; TAKE AS DIRECTED: TAKE 1 TABLET AT BEDTIME  Metamucil Smooth Texture 58.6 % Oral Powder; USE AS DIRECTED: MIX 1  TABLESPOON OF POWDER IN A FULL GLASS OF WATER.`DRINK FULL GLASS TWICE  DAILY  Methenamine Hippurate 1 GM Oral Tablet; TAKE 1 TABLET TWICE DAILY  Multi Vitamin Daily TABS; TAKE 1 TABLET DAILY  Oseltamivir Phosphate 75 MG Oral Capsule; TAKE 1 CAPSULE TWICE DAILY  Pen Needles 32G X 4 MM Miscellaneous  Preferred Plus Unifine Pentips 31G X 6 MM Miscellaneous  Prolensa 0.07 % Ophthalmic Solution  Senna 8.6 MG Oral Capsule; TAKE AS DIRECTED: TAKE 2 CAPSULES DAILY AT  BEDTIME  TheraCran  MG Oral Capsule; TAKE 1 CAPSULE TWICE DAILY  Timolol Maleate 0.5 % Ophthalmic Solution; INSTILL 1 DROP INTO BOTH EYES 2 TIMES  DAILY  Togetachew SoloStar 300 UNIT/ML Subcutaneous Solution Pen-injector; INJECT 42 UNIT  Daily  Tylenol Extra Strength 500 MG Oral Tablet; Take 1 tablet twice daily

## 2018-07-09 NOTE — ED ADULT NURSE REASSESSMENT NOTE - NS ED NURSE REASSESS COMMENT FT1
assumed care of patient from MIRANDA Perez. patient resting in bed. safety and comfort maintained. Will continue to monitor.
Pt received from JAY Hyde.  Fall/harm risk explained. Pt instructed to use call bell for assistance with needs.  bed rails up, stretcher locked. Will continue monitoring.

## 2018-07-10 LAB
ANION GAP SERPL CALC-SCNC: 8 MMOL/L — SIGNIFICANT CHANGE UP (ref 5–17)
BASOPHILS # BLD AUTO: 0.02 K/UL — SIGNIFICANT CHANGE UP (ref 0–0.2)
BASOPHILS NFR BLD AUTO: 0.3 % — SIGNIFICANT CHANGE UP (ref 0–2)
BUN SERPL-MCNC: 26 MG/DL — HIGH (ref 7–23)
CALCIUM SERPL-MCNC: 8.5 MG/DL — SIGNIFICANT CHANGE UP (ref 8.5–10.1)
CHLORIDE SERPL-SCNC: 106 MMOL/L — SIGNIFICANT CHANGE UP (ref 96–108)
CO2 SERPL-SCNC: 27 MMOL/L — SIGNIFICANT CHANGE UP (ref 22–31)
CREAT SERPL-MCNC: 0.94 MG/DL — SIGNIFICANT CHANGE UP (ref 0.5–1.3)
EOSINOPHIL # BLD AUTO: 0.49 K/UL — SIGNIFICANT CHANGE UP (ref 0–0.5)
EOSINOPHIL NFR BLD AUTO: 8.3 % — HIGH (ref 0–6)
GLUCOSE BLDC GLUCOMTR-MCNC: 127 MG/DL — HIGH (ref 70–99)
GLUCOSE BLDC GLUCOMTR-MCNC: 263 MG/DL — HIGH (ref 70–99)
GLUCOSE SERPL-MCNC: 91 MG/DL — SIGNIFICANT CHANGE UP (ref 70–99)
HBA1C BLD-MCNC: 6.8 % — HIGH (ref 4–5.6)
HCT VFR BLD CALC: 32.5 % — LOW (ref 34.5–45)
HGB BLD-MCNC: 10.9 G/DL — LOW (ref 11.5–15.5)
IMM GRANULOCYTES NFR BLD AUTO: 0.3 % — SIGNIFICANT CHANGE UP (ref 0–1.5)
LYMPHOCYTES # BLD AUTO: 1.12 K/UL — SIGNIFICANT CHANGE UP (ref 1–3.3)
LYMPHOCYTES # BLD AUTO: 19.1 % — SIGNIFICANT CHANGE UP (ref 13–44)
MCHC RBC-ENTMCNC: 31.4 PG — SIGNIFICANT CHANGE UP (ref 27–34)
MCHC RBC-ENTMCNC: 33.5 GM/DL — SIGNIFICANT CHANGE UP (ref 32–36)
MCV RBC AUTO: 93.7 FL — SIGNIFICANT CHANGE UP (ref 80–100)
MONOCYTES # BLD AUTO: 0.81 K/UL — SIGNIFICANT CHANGE UP (ref 0–0.9)
MONOCYTES NFR BLD AUTO: 13.8 % — SIGNIFICANT CHANGE UP (ref 2–14)
NEUTROPHILS # BLD AUTO: 3.41 K/UL — SIGNIFICANT CHANGE UP (ref 1.8–7.4)
NEUTROPHILS NFR BLD AUTO: 58.2 % — SIGNIFICANT CHANGE UP (ref 43–77)
NRBC # BLD: 0 /100 WBCS — SIGNIFICANT CHANGE UP (ref 0–0)
PLATELET # BLD AUTO: 157 K/UL — SIGNIFICANT CHANGE UP (ref 150–400)
POTASSIUM SERPL-MCNC: 4.4 MMOL/L — SIGNIFICANT CHANGE UP (ref 3.5–5.3)
POTASSIUM SERPL-SCNC: 4.4 MMOL/L — SIGNIFICANT CHANGE UP (ref 3.5–5.3)
RBC # BLD: 3.47 M/UL — LOW (ref 3.8–5.2)
RBC # FLD: 12.3 % — SIGNIFICANT CHANGE UP (ref 10.3–14.5)
SODIUM SERPL-SCNC: 141 MMOL/L — SIGNIFICANT CHANGE UP (ref 135–145)
T3 SERPL-MCNC: 100 NG/DL — SIGNIFICANT CHANGE UP (ref 80–200)
T4 AB SER-ACNC: 7.7 UG/DL — SIGNIFICANT CHANGE UP (ref 4.6–12)
TSH SERPL-MCNC: 1.36 UU/ML — SIGNIFICANT CHANGE UP (ref 0.36–3.74)
WBC # BLD: 5.87 K/UL — SIGNIFICANT CHANGE UP (ref 3.8–10.5)
WBC # FLD AUTO: 5.87 K/UL — SIGNIFICANT CHANGE UP (ref 3.8–10.5)

## 2018-07-10 PROCEDURE — 99233 SBSQ HOSP IP/OBS HIGH 50: CPT

## 2018-07-10 RX ADMIN — HEPARIN SODIUM 5000 UNIT(S): 5000 INJECTION INTRAVENOUS; SUBCUTANEOUS at 06:11

## 2018-07-10 RX ADMIN — HEPARIN SODIUM 5000 UNIT(S): 5000 INJECTION INTRAVENOUS; SUBCUTANEOUS at 21:58

## 2018-07-10 RX ADMIN — Medication 81 MILLIGRAM(S): at 11:32

## 2018-07-10 RX ADMIN — Medication 3: at 12:09

## 2018-07-10 RX ADMIN — LATANOPROST 1 DROP(S): 0.05 SOLUTION/ DROPS OPHTHALMIC; TOPICAL at 21:59

## 2018-07-10 RX ADMIN — Medication 1 TABLET(S): at 11:32

## 2018-07-10 RX ADMIN — Medication 600 MILLIGRAM(S): at 11:32

## 2018-07-10 RX ADMIN — Medication 600 MILLIGRAM(S): at 12:01

## 2018-07-10 RX ADMIN — BRIMONIDINE TARTRATE 1 DROP(S): 2 SOLUTION/ DROPS OPHTHALMIC at 06:11

## 2018-07-10 RX ADMIN — Medication 1 DROP(S): at 17:17

## 2018-07-10 RX ADMIN — Medication 100 MILLIGRAM(S): at 17:17

## 2018-07-10 RX ADMIN — BRIMONIDINE TARTRATE 1 DROP(S): 2 SOLUTION/ DROPS OPHTHALMIC at 17:17

## 2018-07-10 RX ADMIN — ATORVASTATIN CALCIUM 10 MILLIGRAM(S): 80 TABLET, FILM COATED ORAL at 21:55

## 2018-07-10 RX ADMIN — Medication 1 DROP(S): at 06:12

## 2018-07-10 RX ADMIN — HEPARIN SODIUM 5000 UNIT(S): 5000 INJECTION INTRAVENOUS; SUBCUTANEOUS at 15:33

## 2018-07-10 RX ADMIN — Medication 100 MILLIGRAM(S): at 06:12

## 2018-07-10 RX ADMIN — ATORVASTATIN CALCIUM 10 MILLIGRAM(S): 80 TABLET, FILM COATED ORAL at 02:12

## 2018-07-10 RX ADMIN — INSULIN GLARGINE 42 UNIT(S): 100 INJECTION, SOLUTION SUBCUTANEOUS at 08:19

## 2018-07-10 RX ADMIN — FAMOTIDINE 20 MILLIGRAM(S): 10 INJECTION INTRAVENOUS at 11:32

## 2018-07-10 RX ADMIN — SENNA PLUS 2 TABLET(S): 8.6 TABLET ORAL at 21:55

## 2018-07-10 RX ADMIN — LOSARTAN POTASSIUM 25 MILLIGRAM(S): 100 TABLET, FILM COATED ORAL at 06:12

## 2018-07-10 NOTE — CONSULT NOTE ADULT - SUBJECTIVE AND OBJECTIVE BOX
CHIEF COMPLAINT: Back pain    HPI: Patient is 89 y/o female with h/o HTN, DM who fell at home. Taken to the ER c/o back pain.    PAST MEDICAL & SURGICAL HISTORY:  Shoulder fracture, right  DM (diabetes mellitus): type I  Glaucoma  GERD (gastroesophageal reflux disease)  Breast cancer: right  Hearing loss: b/l  Atherosclerosis  DVT (deep venous thrombosis)  HLD (hyperlipidemia)  HTN (hypertension)  History of tonsillectomy  H/O unilateral oophorectomy: left ovary  Elective surgery: left hip surgery from fracture  S/P betzy  Breast cancer: right massectomy      FAMILY HISTORY:  No pertinent family history in first degree relatives      SOCIAL HISTORY: Non smoker, denies use of alcohol or drug products    REVIEW OF SYSTEMS:    CONSTITUTIONAL: No fever, weight loss, or fatigue  HEENT: Normal extraoccular movements,   NECK: See HPI  RESPIRATORY: No cough, wheezing, chills or hemoptysis; No shortness of breath  CARDIOVASCULAR: No chest pain, palpitations, dizziness, or leg swelling  GASTROINTESTINAL: No abdominal or epigastric pain. No nausea, vomiting, or hematemesis; No diarrhea or constipation. No melena or hematochezia.  GENITOURINARY: No dysuria, frequency, hematuria, or incontinence  NEUROLOGICAL: See HPI  SKIN: No itching, burning, rashes, or lesions   LYMPH NODES: No enlarged glands  ENDOCRINE: No heat or cold intolerance; No hair loss  MUSCULOSKELETAL: See HPI  PSYCHIATRIC: No depression, anxiety, mood swings, or difficulty sleeping  HEME/LYMPH: No easy bruising, or bleeding gums      Vital Signs Last 24 Hrs  T(C): 36.7 (10 Jul 2018 06:07), Max: 37 (2018 12:00)  T(F): 98.1 (10 Jul 2018 06:07), Max: 98.6 (2018 12:00)  HR: 65 (10 Jul 2018 06:07) (62 - 80)  BP: 141/49 (10 Jul 2018 06:07) (113/53 - 141/49)  BP(mean): --  RR: 16 (10 Jul 2018 06:07) (16 - 18)  SpO2: 93% (10 Jul 2018 06:07) (92% - 96%)  I&O's Detail    2018 07:01  -  10 Jul 2018 07:00  --------------------------------------------------------  IN:  Total IN: 0 mL    OUT:    Voided: 300 mL  Total OUT: 300 mL    Total NET: -300 mL          LABS:                        10.9   5.87  )-----------( 157      ( 10 Jul 2018 05:15 )             32.5     07-10    141  |  106  |  26<H>  ----------------------------<  91  4.4   |  27  |  0.94    Ca    8.5      10 Jul 2018 05:15    TPro  7.4  /  Alb  3.3  /  TBili  0.6  /  DBili  x   /  AST  12<L>  /  ALT  12  /  AlkPhos  79  07-08      Urinalysis Basic - ( 2018 17:41 )    Color: Yellow / Appearance: Slightly Turbid / S.010 / pH: x  Gluc: x / Ketone: Negative  / Bili: Negative / Urobili: Negative mg/dL   Blood: x / Protein: 30 mg/dL / Nitrite: Negative   Leuk Esterase: Moderate / RBC: 0-5 /HPF / WBC 11-25   Sq Epi: x / Non Sq Epi: Moderate / Bacteria: Few        RADIOLOGY & ADDITIONAL STUDIES: CT Scan LS Spine - mild compression fracture of superior endplate L2    PHYSICAL EXAM:  Comfortable  HEENT: Unremarkable  Respiratory: CTA bilaterally  Cardiovascular: S1 and S2, RRR  Gastrointestinal: BS+, soft, NT/ND  Vascular: 2+ peripheral pulses  Skin: No rashes  Spinal Alignment:   Neck: supple, NT, good ROM  Back: NT  Extremities:              Sensation:  intact to light touch           Motor exam: Good MS B UE/ B LE    A/P :  L2 Compression Fracture  Plan:  - Brace  - OOB/PT as tolerated  - Continue pain management  - Agree with rehab placement

## 2018-07-10 NOTE — PROGRESS NOTE ADULT - ASSESSMENT
89 y/o female with a PMHx of DM, HTN, Hyperlipidemia, glaucoma presents to the ED s/p fall with compression Fx and possible UTI    Assessment:  L2 Compression Fracture  Type 1 DM  Hypertension  Hyperlipidemia  Glaucoma  UTI ruled out  Recent falls  Breast Cancer  Arthritis  ASHD  GERD  Macular Degeneration    Plan:  Currently on ortho unit  PT evaluation and therapy  Rehab Placement  Continue Losartan  Insulin Basal and coverage  Urine and blood cultures  Rocephin empirically  Ortho Consult appreciated  Brace ordered  Continue statin  D/c Rocephin  Hold Hiprex until; discharge  Pain control  DVT prophylaxis

## 2018-07-11 ENCOUNTER — TRANSCRIPTION ENCOUNTER (OUTPATIENT)
Age: 83
End: 2018-07-11

## 2018-07-11 VITALS
SYSTOLIC BLOOD PRESSURE: 126 MMHG | OXYGEN SATURATION: 93 % | HEART RATE: 59 BPM | TEMPERATURE: 98 F | RESPIRATION RATE: 16 BRPM | DIASTOLIC BLOOD PRESSURE: 44 MMHG

## 2018-07-11 LAB
GLUCOSE BLDC GLUCOMTR-MCNC: 116 MG/DL — HIGH (ref 70–99)
GLUCOSE BLDC GLUCOMTR-MCNC: 243 MG/DL — HIGH (ref 70–99)
GLUCOSE BLDC GLUCOMTR-MCNC: 245 MG/DL — HIGH (ref 70–99)

## 2018-07-11 PROCEDURE — 99239 HOSP IP/OBS DSCHRG MGMT >30: CPT

## 2018-07-11 RX ORDER — TRAMADOL HYDROCHLORIDE 50 MG/1
1 TABLET ORAL
Qty: 0 | Refills: 0 | DISCHARGE
Start: 2018-07-11

## 2018-07-11 RX ORDER — ACETAMINOPHEN 500 MG
1 TABLET ORAL
Qty: 0 | Refills: 0 | DISCHARGE
Start: 2018-07-11

## 2018-07-11 RX ORDER — IBUPROFEN 200 MG
1 TABLET ORAL
Qty: 0 | Refills: 0 | DISCHARGE
Start: 2018-07-11

## 2018-07-11 RX ORDER — DOCUSATE SODIUM 100 MG
1 CAPSULE ORAL
Qty: 0 | Refills: 0 | DISCHARGE
Start: 2018-07-11

## 2018-07-11 RX ORDER — ACETAMINOPHEN 500 MG
2 TABLET ORAL
Qty: 0 | Refills: 0 | DISCHARGE
Start: 2018-07-11

## 2018-07-11 RX ORDER — LOSARTAN POTASSIUM 100 MG/1
1 TABLET, FILM COATED ORAL
Qty: 0 | Refills: 0 | DISCHARGE
Start: 2018-07-11

## 2018-07-11 RX ORDER — SENNA PLUS 8.6 MG/1
2 TABLET ORAL
Qty: 0 | Refills: 0 | DISCHARGE
Start: 2018-07-11

## 2018-07-11 RX ORDER — HEPARIN SODIUM 5000 [USP'U]/ML
5000 INJECTION INTRAVENOUS; SUBCUTANEOUS
Qty: 0 | Refills: 0 | DISCHARGE
Start: 2018-07-11

## 2018-07-11 RX ADMIN — INSULIN GLARGINE 42 UNIT(S): 100 INJECTION, SOLUTION SUBCUTANEOUS at 08:19

## 2018-07-11 RX ADMIN — Medication 600 MILLIGRAM(S): at 02:54

## 2018-07-11 RX ADMIN — LOSARTAN POTASSIUM 25 MILLIGRAM(S): 100 TABLET, FILM COATED ORAL at 06:36

## 2018-07-11 RX ADMIN — Medication 1 DROP(S): at 06:37

## 2018-07-11 RX ADMIN — Medication 2: at 16:44

## 2018-07-11 RX ADMIN — Medication 100 MILLIGRAM(S): at 16:35

## 2018-07-11 RX ADMIN — BRIMONIDINE TARTRATE 1 DROP(S): 2 SOLUTION/ DROPS OPHTHALMIC at 06:38

## 2018-07-11 RX ADMIN — Medication 2: at 11:40

## 2018-07-11 RX ADMIN — Medication 600 MILLIGRAM(S): at 12:06

## 2018-07-11 RX ADMIN — Medication 1 TABLET(S): at 10:49

## 2018-07-11 RX ADMIN — Medication 100 MILLIGRAM(S): at 06:36

## 2018-07-11 RX ADMIN — Medication 81 MILLIGRAM(S): at 10:49

## 2018-07-11 RX ADMIN — FAMOTIDINE 20 MILLIGRAM(S): 10 INJECTION INTRAVENOUS at 10:48

## 2018-07-11 RX ADMIN — Medication 1 DROP(S): at 16:35

## 2018-07-11 RX ADMIN — HEPARIN SODIUM 5000 UNIT(S): 5000 INJECTION INTRAVENOUS; SUBCUTANEOUS at 06:37

## 2018-07-11 RX ADMIN — BRIMONIDINE TARTRATE 1 DROP(S): 2 SOLUTION/ DROPS OPHTHALMIC at 16:36

## 2018-07-11 RX ADMIN — Medication 600 MILLIGRAM(S): at 03:24

## 2018-07-11 RX ADMIN — HEPARIN SODIUM 5000 UNIT(S): 5000 INJECTION INTRAVENOUS; SUBCUTANEOUS at 13:21

## 2018-07-11 NOTE — DISCHARGE NOTE ADULT - PLAN OF CARE
resolution Rehab PT  Brace  Rehabilitation  Return after rehab to adult facility  Continue insulin and coverage  Resume Hiprex to prevent UTI  Supportive care per rehab MD  Follow up with Dr. Irvin within 7 days from d/c from rehab  Follow up with Dr. Ambrocio after 2 weeks.

## 2018-07-11 NOTE — DISCHARGE NOTE ADULT - CARE PLAN
Principal Discharge DX:	Closed wedge compression fracture of second lumbar vertebra, initial encounter  Goal:	resolution Rehab  Assessment and plan of treatment:	PT  Brace  Rehabilitation  Return after rehab to adult facility  Continue insulin and coverage  Resume Hiprex to prevent UTI  Supportive care per rehab MD  Follow up with Dr. Irvin within 7 days from d/c from rehab  Follow up with Dr. Ambrocio after 2 weeks.  Secondary Diagnosis:	Type 1 diabetes mellitus without complication  Secondary Diagnosis:	Gastroesophageal reflux disease, esophagitis presence not specified  Secondary Diagnosis:	Bilateral chronic primary angle-closure glaucoma, indeterminate stage  Secondary Diagnosis:	Atherosclerosis  Secondary Diagnosis:	Sensorineural hearing loss (SNHL) of both ears  Secondary Diagnosis:	Hyperlipidemia, unspecified hyperlipidemia type

## 2018-07-11 NOTE — PROGRESS NOTE ADULT - SUBJECTIVE AND OBJECTIVE BOX
SUBJECTIVE:    CHIEF COMPLAINT:  Patient is a 89y old  Female who presents with a chief complaint of Hip and back pain (09 Jul 2018 08:59)    HPI:  89 y/o female with a PMHx of DM, HTN, Hyperlipidemia, glaucoma presents to the ED s/p fall.  Pt fell against the wall after trying to open a heavy door. She was seenin ED day PTA s/p fall because her L shoulder was bothering her but on day of admission  she bent down and her L hip started bothering her.  She is s/p total his replacement on the left side.  She is also having some pressure with urination.  Denies CP, abd pain, SOB, or N/V/D. Pt unable to ambulate due to back pain.      Active Problems  Arthritis (716.90) (M19.90)  Atherosclerosis of native coronary artery (414.01) (I25.10)  Bilateral hearing loss (389.9) (H91.93)  Breast cancer (174.9) (C50.919)  Cataract, bilateral (366.9) (H26.9)  GERD (gastroesophageal reflux disease) (530.81) (K21.9)  Glaucoma (365.9) (H40.9)  HTN (hypertension) (401.9) (I10)  Hyperlipidemia (272.4) (E78.5)  Influenza A (487.1) (J10.1)  Need for prophylactic vaccination and inoculation against influenza (V04.81) (Z23)  PPD positive (795.51) (R76.11)  Preop cardiovascular exam (V72.81) (Z01.810)  Pulmonary nodule (793.11) (R91.1)  Type 1 diabetes (250.01) (E10.9)  URI, acute (465.9) (J06.9)  UTI symptoms (788.99) (R39.9)  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)    Past Medical History  History of deep vein thrombosis (DVT) of lower extremity (V12.51) (Z86.718)  No pertinent past medical history    Surgical History  History of Appendectomy  History of Breast Surgery   · -Mastectomy  History of Hip Surgery   · -Open Reduction Internal Fixation (ORIF)      -Left Hip Fracture  History of Oophorectomy For Ectopic Pregnancy Left Ovary  History of Tonsillectomy    Family History  Family history of breast cancer (V16.3) (Z80.3) : Sister  Family history of leukemia (V16.6) (Z80.6) : Mother  Family history of malignant neoplasm of larynx (V16.2) (Z80.2) : Brother  Family History of prostate cancer (V16.42) (Z80.42) : Father, Brother    Social History  Former smoker (V15.82) (Z87.891)  Has 2 children  No illicit drug use  Nursing home resident (V60.6) (Z59.3)   · [facility]  Occasional alcohol use   (V61.07) (Z63.4)    Out Patient Meds:  Acidophilus CHEW; TAKE 1 CHEWABLE TABLET DAILY  Aspirin EC Lo-Dose 81 MG TBEC; TAKE 1 TABLET DAILY  Mellissa Contour Next Test In Vitro Strip; TEST 3 TIMES DAILY  Mellissa Contour Next Test STRP; TEST TWICE DAILY  Besivance 0.6 % Ophthalmic Suspension  Colace 100 MG Oral Capsule; TAKE 1 CAPSULE 3 TIMES DAILY  Combigan 0.2-0.5 % Ophthalmic Solution; INSTILL 1 DROP, TWICE DAILY  Cozaar 25 MG Oral Tablet; TAKE 1 TABLET DAILY  Durezol 0.05 % Ophthalmic Emulsion  EQ Ranitidine 75 MG Oral Tablet; Take 1 tablet twice daily  Glucagon Emergency 1 MG Injection Kit; USE AS DIRECTED  HumaLOG KwikPen 100 UNIT/ML Subcutaneous Solution Pen-injector; INJECT  SUBCUTANEOUSLY AS DIRECTED: 3 TIMES DAILY, AS PER SLIDING  SCALE  ICaps Areds 2 Oral Capsule; TAKE AS DIRECTED: TAKE 1 CAPSULE TWICE DAILY  Lipitor 10 MG Oral Tablet; TAKE 1 TABLET DAILY  Lumigan 0.01 % Ophthalmic Solution; INSTILL 1 DROP, DAILY  Melatonin 3 MG Oral Tablet; TAKE AS DIRECTED: TAKE 1 TABLET AT BEDTIME  Metamucil Smooth Texture 58.6 % Oral Powder; USE AS DIRECTED: MIX 1  TABLESPOON OF POWDER IN A FULL GLASS OF WATER.`DRINK FULL GLASS TWICE  DAILY  Methenamine Hippurate 1 GM Oral Tablet; TAKE 1 TABLET TWICE DAILY  Multi Vitamin Daily TABS; TAKE 1 TABLET DAILY  Oseltamivir Phosphate 75 MG Oral Capsule; TAKE 1 CAPSULE TWICE DAILY  Pen Needles 32G X 4 MM Miscellaneous  Preferred Plus Unifine Pentips 31G X 6 MM Miscellaneous  Prolensa 0.07 % Ophthalmic Solution  Senna 8.6 MG Oral Capsule; TAKE AS DIRECTED: TAKE 2 CAPSULES DAILY AT  BEDTIME  TheraCran  MG Oral Capsule; TAKE 1 CAPSULE TWICE DAILY  Timolol Maleate 0.5 % Ophthalmic Solution; INSTILL 1 DROP INTO BOTH EYES 2 TIMES  DAILY  [place] SoloStar 300 UNIT/ML Subcutaneous Solution Pen-injector; INJECT 42 UNIT  Daily  Tylenol Extra Strength 500 MG Oral Tablet; Take 1 tablet twice daily (09 Jul 2018 08:59)      Interval HPI and Overnight Events: Pt received brace. Has been working with PT. OOB with pain with the brace. Was only able to ambulate a few steps. Will need rehab prior to return to adult facility.    REVIEW OF SYSTEMS:  CONSTITUTIONAL: No weakness, fevers or chills  EYES/ENT: No visual changes;  No vertigo or throat pain   NECK: No pain or stiffness  RESPIRATORY: No cough, wheezing, hemoptysis; No shortness of breath  CARDIOVASCULAR: No chest pain or palpitations  GASTROINTESTINAL: No abdominal or epigastric pain. No nausea, vomiting, or hematemesis; No diarrhea or constipation. No melena or hematochezia.  GENITOURINARY: No dysuria, frequency or hematuria  NEUROLOGICAL: No numbness or weakness  SKIN: No itching, burning, rashes, or lesions   All other review of systems is negative unless indicated above    OBJECTIVE    Vital Signs Last 24 Hrs  T(C): 36.7 (11 Jul 2018 11:29), Max: 36.8 (10 Jul 2018 17:21)  T(F): 98 (11 Jul 2018 11:29), Max: 98.3 (10 Jul 2018 17:21)  HR: 62 (11 Jul 2018 11:29) (62 - 78)  BP: 118/61 (11 Jul 2018 11:29) (118/61 - 152/48)  BP(mean): 73 (11 Jul 2018 11:29) (73 - 73)  RR: 16 (11 Jul 2018 11:29) (16 - 18)  SpO2: 95% (11 Jul 2018 11:29) (92% - 95%)    MEDICATIONS  (STANDING):  aspirin  chewable 81 milliGRAM(s) Oral daily  atorvastatin 10 milliGRAM(s) Oral at bedtime  brimonidine 0.2% Ophthalmic Solution 1 Drop(s) Both EYES two times a day  dextrose 5%. 1000 milliLiter(s) (50 mL/Hr) IV Continuous <Continuous>  dextrose 50% Injectable 12.5 Gram(s) IV Push once  dextrose 50% Injectable 25 Gram(s) IV Push once  dextrose 50% Injectable 25 Gram(s) IV Push once  docusate sodium 100 milliGRAM(s) Oral two times a day  famotidine    Tablet 20 milliGRAM(s) Oral daily  heparin  Injectable 5000 Unit(s) SubCutaneous every 8 hours  insulin glargine Injectable (LANTUS) 42 Unit(s) SubCutaneous every morning  insulin lispro (HumaLOG) corrective regimen sliding scale   SubCutaneous three times a day before meals  latanoprost 0.005% Ophthalmic Solution 1 Drop(s) Both EYES at bedtime  losartan 25 milliGRAM(s) Oral daily  multivitamin 1 Tablet(s) Oral daily  sodium chloride 0.9% with potassium chloride 20 mEq/L 1000 milliLiter(s) (40 mL/Hr) IV Continuous <Continuous>  timolol 0.25% Solution 1 Drop(s) Both EYES two times a day      LABS:                         10.9   5.87  )-----------( 157      ( 10 Jul 2018 05:15 )             32.5     07-10    141  |  106  |  26<H>  ----------------------------<  91  4.4   |  27  |  0.94    Ca    8.5      10 Jul 2018 05:15    Specimen Source .Blood None   07-09 @ 10:30  Culture Results  No growth to date.  Specimen Source .Urine None   07-08 @ 17:41  Culture Results  No growth    CAPILLARY BLOOD GLUCOSE  POCT Blood Glucose.: 243 mg/dL (11 Jul 2018 11:38)  POCT Blood Glucose.: 116 mg/dL (11 Jul 2018 07:50)
Chief Complaint: Back Pain    History: Patient is feeling better. Brace is helping with her pain.     OBJECTIVE:     Vital Signs Last 24 Hrs  T(C): 36.7 (11 Jul 2018 05:04), Max: 36.9 (10 Jul 2018 11:24)  T(F): 98.1 (11 Jul 2018 05:04), Max: 98.4 (10 Jul 2018 11:24)  HR: 63 (11 Jul 2018 05:04) (63 - 78)  BP: 143/48 (11 Jul 2018 05:04) (121/59 - 152/48)  BP(mean): 68 (10 Jul 2018 11:24) (68 - 68)  RR: 18 (11 Jul 2018 05:04) (15 - 18)  SpO2: 92% (11 Jul 2018 05:04) (92% - 94%)    Physical Exam:         Awake and alert         HEENT - unremarkable         Neck - supple         Back: Mild tenderness TL region, wearing brace         Bilateral Upper Extremities:             Good Motor Strength             Sensation intact         Bilateral Lower Extremities             Good Motor Strength             Senation intact             I&O's Detail      LABS:                        10.9   5.87  )-----------( 157      ( 10 Jul 2018 05:15 )             32.5     07-10    141  |  106  |  26<H>  ----------------------------<  91  4.4   |  27  |  0.94    Ca    8.5      10 Jul 2018 05:15      A/P :  89y Female with L2 compression fracture  -    OOB/PT with brace....Patient will need to wear brace for 6 weeks  -    Agree with rehab placement  -    Continue present treatment  -    Patient can follow up at my office 2 weeks after discharge [phone]
SUBJECTIVE:    CHIEF COMPLAINT:  Patient is a 89y old  Female who presents with a chief complaint of Hip and back pain (2018 08:59)      HPI:  89 y/o female with a PMHx of DM, HTN, Hyperlipidemia, glaucoma presents to the ED s/p fall.  Pt fell against the wall after trying to open a heavy door. She was seenin ED day PTA s/p fall because her L shoulder was bothering her but on day of admission  she bent down and her L hip started bothering her.  She is s/p total his replacement on the left side.  She is also having some pressure with urination.  Denies CP, abd pain, SOB, or N/V/D. Pt unable to ambulate due to back pain.      Active Problems  Arthritis (716.90) (M19.90)  Atherosclerosis of native coronary artery (414.01) (I25.10)  Bilateral hearing loss (389.9) (H91.93)  Breast cancer (174.9) (C50.919)  Cataract, bilateral (366.9) (H26.9)  GERD (gastroesophageal reflux disease) (530.81) (K21.9)  Glaucoma (365.9) (H40.9)  HTN (hypertension) (401.9) (I10)  Hyperlipidemia (272.4) (E78.5)  Influenza A (487.1) (J10.1)  Need for prophylactic vaccination and inoculation against influenza (V04.81) (Z23)  PPD positive (795.51) (R76.11)  Preop cardiovascular exam (V72.81) (Z01.810)  Pulmonary nodule (793.11) (R91.1)  Type 1 diabetes (250.01) (E10.9)  URI, acute (465.9) (J06.9)  UTI symptoms (788.99) (R39.9)  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)    Past Medical History  History of deep vein thrombosis (DVT) of lower extremity (V12.51) (Z86.718)  No pertinent past medical history    Surgical History  History of Appendectomy  History of Breast Surgery   · -Mastectomy  History of Hip Surgery   · -Open Reduction Internal Fixation (ORIF)      -Left Hip Fracture  History of Oophorectomy For Ectopic Pregnancy Left Ovary  History of Tonsillectomy    Family History  Family history of breast cancer (V16.3) (Z80.3) : Sister  Family history of leukemia (V16.6) (Z80.6) : Mother  Family history of malignant neoplasm of larynx (V16.2) (Z80.2) : Brother  Family History of prostate cancer (V16.42) (Z80.42) : Father, Brother    Social History  Former smoker (V15.82) (Z87.891)  Has 2 children  No illicit drug use  Nursing home resident (V60.6) (Z59.3)   · [facility]  Occasional alcohol use   (V61.07) (Z63.4)    Out Patient Meds:  Acidophilus CHEW; TAKE 1 CHEWABLE TABLET DAILY  Aspirin EC Lo-Dose 81 MG TBEC; TAKE 1 TABLET DAILY  Mellissa Contour Next Test In Vitro Strip; TEST 3 TIMES DAILY  Mellissa Contour Next Test STRP; TEST TWICE DAILY  Besivance 0.6 % Ophthalmic Suspension  Colace 100 MG Oral Capsule; TAKE 1 CAPSULE 3 TIMES DAILY  Combigan 0.2-0.5 % Ophthalmic Solution; INSTILL 1 DROP, TWICE DAILY  Cozaar 25 MG Oral Tablet; TAKE 1 TABLET DAILY  Durezol 0.05 % Ophthalmic Emulsion  EQ Ranitidine 75 MG Oral Tablet; Take 1 tablet twice daily  Glucagon Emergency 1 MG Injection Kit; USE AS DIRECTED  HumaLOG KwikPen 100 UNIT/ML Subcutaneous Solution Pen-injector; INJECT  SUBCUTANEOUSLY AS DIRECTED: 3 TIMES DAILY, AS PER SLIDING  SCALE  ICaps Areds 2 Oral Capsule; TAKE AS DIRECTED: TAKE 1 CAPSULE TWICE DAILY  Lipitor 10 MG Oral Tablet; TAKE 1 TABLET DAILY  Lumigan 0.01 % Ophthalmic Solution; INSTILL 1 DROP, DAILY  Melatonin 3 MG Oral Tablet; TAKE AS DIRECTED: TAKE 1 TABLET AT BEDTIME  Metamucil Smooth Texture 58.6 % Oral Powder; USE AS DIRECTED: MIX 1  TABLESPOON OF POWDER IN A FULL GLASS OF WATER.`DRINK FULL GLASS TWICE  DAILY  Methenamine Hippurate 1 GM Oral Tablet; TAKE 1 TABLET TWICE DAILY  Multi Vitamin Daily TABS; TAKE 1 TABLET DAILY  Oseltamivir Phosphate 75 MG Oral Capsule; TAKE 1 CAPSULE TWICE DAILY  Pen Needles 32G X 4 MM Miscellaneous  Preferred Plus Unifine Pentips 31G X 6 MM Miscellaneous  Prolensa 0.07 % Ophthalmic Solution  Senna 8.6 MG Oral Capsule; TAKE AS DIRECTED: TAKE 2 CAPSULES DAILY AT  BEDTIME  TheraCran  MG Oral Capsule; TAKE 1 CAPSULE TWICE DAILY  Timolol Maleate 0.5 % Ophthalmic Solution; INSTILL 1 DROP INTO BOTH EYES 2 TIMES  DAILY  [place] SoloStar 300 UNIT/ML Subcutaneous Solution Pen-injector; INJECT 42 UNIT  Daily  Tylenol Extra Strength 500 MG Oral Tablet; Take 1 tablet twice daily (2018 08:59)      Interval HPI and Overnight Events: Pt without complaints other than back pain when attempts to get up.    REVIEW OF SYSTEMS:  CONSTITUTIONAL: No weakness, fevers or chills  EYES/ENT: No visual changes;  No vertigo or throat pain   NECK: No pain or stiffness  RESPIRATORY: No cough, wheezing, hemoptysis; No shortness of breath  CARDIOVASCULAR: No chest pain or palpitations  GASTROINTESTINAL: No abdominal or epigastric pain. No nausea, vomiting, or hematemesis; No diarrhea or constipation. No melena or hematochezia.  GENITOURINARY: No dysuria, frequency or hematuria  NEUROLOGICAL: No numbness or weakness  SKIN: No itching, burning, rashes, or lesions   All other review of systems is negative unless indicated above    OBJECTIVE    Vital Signs Last 24 Hrs  T(C): 36.7 (10 Jul 2018 06:07), Max: 37 (2018 12:00)  T(F): 98.1 (10 Jul 2018 06:07), Max: 98.6 (2018 12:00)  HR: 65 (10 Jul 2018 06:07) (62 - 80)  BP: 141/49 (10 Jul 2018 06:07) (113/53 - 141/49)  BP(mean): --  RR: 16 (10 Jul 2018 06:07) (16 - 18)  SpO2: 93% (10 Jul 2018 06:07) (92% - 96%)    MEDICATIONS  (STANDING):  aspirin  chewable 81 milliGRAM(s) Oral daily  atorvastatin 10 milliGRAM(s) Oral at bedtime  brimonidine 0.2% Ophthalmic Solution 1 Drop(s) Both EYES two times a day  dextrose 5%. 1000 milliLiter(s) (50 mL/Hr) IV Continuous <Continuous>  dextrose 50% Injectable 12.5 Gram(s) IV Push once  dextrose 50% Injectable 25 Gram(s) IV Push once  dextrose 50% Injectable 25 Gram(s) IV Push once  docusate sodium 100 milliGRAM(s) Oral two times a day  famotidine    Tablet 20 milliGRAM(s) Oral daily  heparin  Injectable 5000 Unit(s) SubCutaneous every 8 hours  insulin glargine Injectable (LANTUS) 42 Unit(s) SubCutaneous every morning  insulin lispro (HumaLOG) corrective regimen sliding scale   SubCutaneous three times a day before meals  latanoprost 0.005% Ophthalmic Solution 1 Drop(s) Both EYES at bedtime  losartan 25 milliGRAM(s) Oral daily  multivitamin 1 Tablet(s) Oral daily  sodium chloride 0.9% with potassium chloride 20 mEq/L 1000 milliLiter(s) (40 mL/Hr) IV Continuous <Continuous>  timolol 0.25% Solution 1 Drop(s) Both EYES two times a day      LABS:                         10.9   5.87  )-----------( 157      ( 10 Jul 2018 05:15 )             32.5     07-10    141  |  106  |  26<H>  ----------------------------<  91  4.4   |  27  |  0.94    Ca    8.5      10 Jul 2018 05:15    TPro  7.4  /  Alb  3.3  /  TBili  0.6  /  DBili  x   /  AST  12<L>  /  ALT  12  /  AlkPhos  79  07-08    Urinalysis Basic - ( 2018 17:41 )  Color: Yellow / Appearance: Slightly Turbid / S.010 / pH: x  Gluc: x / Ketone: Negative  / Bili: Negative / Urobili: Negative mg/dL   Blood: x / Protein: 30 mg/dL / Nitrite: Negative   Leuk Esterase: Moderate / RBC: 0-5 /HPF / WBC 11-25   Sq Epi: x / Non Sq Epi: Moderate / Bacteria: Few    Specimen Source .Urine None    @ 17:41  Culture Results  No growth    URINE CULTURE     @ 17:41    CULTURE RESULTS   No growth    THYROID PANEL   07-10 @ 05:15  TSH 1.360    CAPILLARY BLOOD GLUCOSE  POCT Blood Glucose.: 127 mg/dL (10 Jul 2018 08:08)  POCT Blood Glucose.: 165 mg/dL (2018 21:16)  POCT Blood Glucose.: 204 mg/dL (2018 16:52)  POCT Blood Glucose.: 144 mg/dL (2018 10:24)        RADIOLOGY/EKG:  Negative CXR  Negative hip x ray  CT abd pelvis L2 compression Fx

## 2018-07-11 NOTE — DISCHARGE NOTE ADULT - CARE PROVIDER_API CALL
Alonso Irvin), Family Medicine  [address] [facility]  [address]  [address] [place] [address]  Phone: [phone]  Fax: [phone]    Wilton Ambrocio), Orthopaedic Surgery  [address] [place] [address]  Phone: [phone]  Fax: [phone]

## 2018-07-11 NOTE — PHYSICAL THERAPY INITIAL EVALUATION ADULT - PERTINENT HX OF CURRENT PROBLEM, REHAB EVAL
Pt admitted to  secondary to back pain and left hip pain. CT pelvis: neg. CT L-spine: L2 compression deformity. Chronic L3 compression deformity.

## 2018-07-11 NOTE — PROGRESS NOTE ADULT - ASSESSMENT
89 y/o female with a PMHx of DM, HTN, Hyperlipidemia, glaucoma presents to the ED s/p fall with compression Fx and possible UTI    Assessment:  L2 Compression Fracture  Type 1 DM  Hypertension  Hyperlipidemia  Glaucoma  UTI ruled out  Recent falls  Breast Cancer  Arthritis  ASHD  GERD  Macular Degeneration    Plan:  Currently on ortho unit  Continue PT  Rehab Placement when bed available  Continue Losartan  Insulin Basal and coverage  Urine and blood cultures  Off Rocephin   Spinal Ortho following - OK for WB with brace  Continue statin  Hold Hiprex until; discharge  Pain control  DVT prophylaxis

## 2018-07-11 NOTE — DISCHARGE NOTE ADULT - SECONDARY DIAGNOSIS.
Type 1 diabetes mellitus without complication Gastroesophageal reflux disease, esophagitis presence not specified Bilateral chronic primary angle-closure glaucoma, indeterminate stage Atherosclerosis Sensorineural hearing loss (SNHL) of both ears Hyperlipidemia, unspecified hyperlipidemia type

## 2018-07-11 NOTE — DISCHARGE NOTE ADULT - PATIENT PORTAL LINK FT
You can access the [email or web address] Portal, offered by [facility], by registering with the following website: [email or web address]

## 2018-07-11 NOTE — DISCHARGE NOTE ADULT - MEDICATION SUMMARY - MEDICATIONS TO STOP TAKING
I will STOP taking the medications listed below when I get home from the hospital:    warfarin 3 mg oral tablet  -- 1 tab(s) by mouth once a day    Zantac 150 oral tablet  -- 1 tab(s) by mouth 2 times a day

## 2018-07-11 NOTE — DISCHARGE NOTE ADULT - HOSPITAL COURSE
Admitted to ortho bed on medicine service. Seen by spinal surgery. Fitted with back brace. Seen by PT. Ruled out for UTI. Maintained on outpatient meds. Arrangements made for rehab. Transferred once stable and bed available.

## 2018-07-11 NOTE — PHYSICAL THERAPY INITIAL EVALUATION ADULT - ACTIVE RANGE OF MOTION EXAMINATION, REHAB EVAL
no Active ROM deficits were identified/Bilateral shoulder flex ~ 90 degrees, bilateral hip flex ~ 90 degrees, and bilateral DF neutral.

## 2018-07-11 NOTE — DISCHARGE NOTE ADULT - MEDICATION SUMMARY - MEDICATIONS TO TAKE
I will START or STAY ON the medications listed below when I get home from the hospital:    aspirin 81 mg oral tablet, chewable  -- 1 tab(s) by mouth once a day  -- Indication: For ashd    acetaminophen 325 mg oral tablet  -- 2 tab(s) by mouth every 4 hours, As needed, For Temp greater than 38.5 C (101.3 F)  -- Indication: For pain    ibuprofen 600 mg oral tablet  -- 1 tab(s) by mouth every 6 hours, As needed, Moderate pain  -- Indication: For pain    traMADol 50 mg oral tablet  -- 1 tab(s) by mouth every 6 hours, As needed, Severe Pain (7 - 10)  -- Indication: For pain    losartan 25 mg oral tablet  -- 1 tab(s) by mouth once a day  -- Indication: For htn    heparin  -- 5000 unit(s) subcutaneous 2 times a day  -- Indication: For DVT prophy    insulin glargine  -- 40 unit(s) subcutaneous once a day (at bedtime)  -- Indication: For type 1 DM    insulin lispro 100 units/mL subcutaneous solution  -- 2 unit(s) subcutaneous 3 times a day (before meals) ; 2 Unit(s) if Glucose 151 - 200  4 Unit(s) if Glucose 201 - 250  6 Unit(s) if Glucose 251 - 300  8 Unit(s) if Glucose 301 - 350  10 Unit(s) if Glucose 351 - 400  12 Unit(s) if Glucose Greater Than 400  -- 2 unit(s) subcutaneous 3 times a day (before meals) ; 2 Unit(s) if Glucose 151 - 200  4 Unit(s) if Glucose 201 - 250  6 Unit(s) if Glucose 251 - 300  8 Unit(s) if Glucose 301 - 350  10 Unit(s) if Glucose 351 - 400  12 Unit(s) if Glucose Greater Than 400  -- Indication: For Type 1 DM    atorvastatin 10 mg oral tablet  -- 1 tab(s) by mouth once a day (at bedtime)  -- Indication: For hyperlipidemia    Zantac  -- 150 milligram(s) by mouth 2 times a day  -- Indication: For GERD    docusate sodium 100 mg oral capsule  -- 1 cap(s) by mouth 2 times a day  -- Indication: For constipation    senna oral tablet  -- 2 tab(s) by mouth once a day (at bedtime), As needed, Constipation  -- Indication: For constipation    Lumigan 0.03% ophthalmic solution  -- 1 drop(s) to each affected eye once a day (in the evening)  -- Indication: For glaucomA    brimonidine 0.2% ophthalmic solution  -- 1 drop(s) to each affected eye 2 times a day  -- Indication: For GLAUCOMA    timolol maleate 0.25% ophthalmic solution  -- 1 drop(s) to each affected eye 2 times a day  -- Indication: For GLAUCOMA    latanoprost 0.005% ophthalmic solution  -- 1 drop(s) to each affected eye once a day (at bedtime)  -- Indication: For GLAUCOMA    Hiprex 1 g oral tablet  -- 1 tab(s) by mouth 2 times a day  -- Indication: For RECURRENT UTI    Multiple Vitamins oral tablet  -- 1 tab(s) by mouth once a day  -- Indication: For vitamin

## 2018-07-14 LAB
CULTURE RESULTS: SIGNIFICANT CHANGE UP
SPECIMEN SOURCE: SIGNIFICANT CHANGE UP

## 2018-07-16 DIAGNOSIS — Z86.718 PERSONAL HISTORY OF OTHER VENOUS THROMBOSIS AND EMBOLISM: ICD-10-CM

## 2018-07-16 DIAGNOSIS — Z79.82 LONG TERM (CURRENT) USE OF ASPIRIN: ICD-10-CM

## 2018-07-16 DIAGNOSIS — Z88.8 ALLERGY STATUS TO OTHER DRUGS, MEDICAMENTS AND BIOLOGICAL SUBSTANCES STATUS: ICD-10-CM

## 2018-07-16 DIAGNOSIS — E10.9 TYPE 1 DIABETES MELLITUS WITHOUT COMPLICATIONS: ICD-10-CM

## 2018-07-16 DIAGNOSIS — K21.9 GASTRO-ESOPHAGEAL REFLUX DISEASE WITHOUT ESOPHAGITIS: ICD-10-CM

## 2018-07-16 DIAGNOSIS — E78.5 HYPERLIPIDEMIA, UNSPECIFIED: ICD-10-CM

## 2018-07-16 DIAGNOSIS — M54.9 DORSALGIA, UNSPECIFIED: ICD-10-CM
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
## 2018-07-16 DIAGNOSIS — H40.2234 CHRONIC ANGLE-CLOSURE GLAUCOMA, BILATERAL, INDETERMINATE STAGE: ICD-10-CM

## 2018-07-16 DIAGNOSIS — Z87.891 PERSONAL HISTORY OF NICOTINE DEPENDENCE: ICD-10-CM

## 2018-07-16 DIAGNOSIS — Z79.899 OTHER LONG TERM (CURRENT) DRUG THERAPY: ICD-10-CM

## 2018-07-16 DIAGNOSIS — H90.3 SENSORINEURAL HEARING LOSS, BILATERAL: ICD-10-CM
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
## 2018-07-16 DIAGNOSIS — S32.029A UNSPECIFIED FRACTURE OF SECOND LUMBAR VERTEBRA, INITIAL ENCOUNTER FOR CLOSED FRACTURE: ICD-10-CM

## 2018-07-16 DIAGNOSIS — Z79.4 LONG TERM (CURRENT) USE OF INSULIN: ICD-10-CM

## 2018-07-16 DIAGNOSIS — I10 ESSENTIAL (PRIMARY) HYPERTENSION: ICD-10-CM

## 2018-07-17 ENCOUNTER — EMERGENCY (EMERGENCY)
Facility: HOSPITAL | Age: 83
LOS: 0 days | Discharge: HOME | End: 2018-07-18
Attending: EMERGENCY MEDICINE | Admitting: EMERGENCY MEDICINE
Payer: MEDICARE

## 2018-07-17 VITALS
HEIGHT: 65 IN | OXYGEN SATURATION: 96 % | SYSTOLIC BLOOD PRESSURE: 115 MMHG | WEIGHT: 199.96 LBS | RESPIRATION RATE: 17 BRPM | DIASTOLIC BLOOD PRESSURE: 60 MMHG | HEART RATE: 76 BPM | TEMPERATURE: 98 F

## 2018-07-17 DIAGNOSIS — H91.93 UNSPECIFIED HEARING LOSS, BILATERAL: ICD-10-CM

## 2018-07-17 DIAGNOSIS — C50.919 MALIGNANT NEOPLASM OF UNSPECIFIED SITE OF UNSPECIFIED FEMALE BREAST: Chronic | ICD-10-CM

## 2018-07-17 DIAGNOSIS — Z90.721 ACQUIRED ABSENCE OF OVARIES, UNILATERAL: Chronic | ICD-10-CM

## 2018-07-17 DIAGNOSIS — Z90.89 ACQUIRED ABSENCE OF OTHER ORGANS: ICD-10-CM

## 2018-07-17 DIAGNOSIS — I25.10 ATHEROSCLEROTIC HEART DISEASE OF NATIVE CORONARY ARTERY WITHOUT ANGINA PECTORIS: ICD-10-CM

## 2018-07-17 DIAGNOSIS — Z90.89 ACQUIRED ABSENCE OF OTHER ORGANS: Chronic | ICD-10-CM

## 2018-07-17 DIAGNOSIS — Z90.49 ACQUIRED ABSENCE OF OTHER SPECIFIED PARTS OF DIGESTIVE TRACT: Chronic | ICD-10-CM

## 2018-07-17 DIAGNOSIS — Z85.3 PERSONAL HISTORY OF MALIGNANT NEOPLASM OF BREAST: ICD-10-CM

## 2018-07-17 DIAGNOSIS — Z90.721 ACQUIRED ABSENCE OF OVARIES, UNILATERAL: ICD-10-CM

## 2018-07-17 DIAGNOSIS — Z86.718 PERSONAL HISTORY OF OTHER VENOUS THROMBOSIS AND EMBOLISM: ICD-10-CM

## 2018-07-17 DIAGNOSIS — R07.9 CHEST PAIN, UNSPECIFIED: ICD-10-CM

## 2018-07-17 DIAGNOSIS — Z79.82 LONG TERM (CURRENT) USE OF ASPIRIN: ICD-10-CM

## 2018-07-17 DIAGNOSIS — Z90.10 ACQUIRED ABSENCE OF UNSPECIFIED BREAST AND NIPPLE: ICD-10-CM

## 2018-07-17 DIAGNOSIS — K21.9 GASTRO-ESOPHAGEAL REFLUX DISEASE WITHOUT ESOPHAGITIS: ICD-10-CM

## 2018-07-17 DIAGNOSIS — F41.0 PANIC DISORDER [EPISODIC PAROXYSMAL ANXIETY]: ICD-10-CM

## 2018-07-17 DIAGNOSIS — I10 ESSENTIAL (PRIMARY) HYPERTENSION: ICD-10-CM

## 2018-07-17 DIAGNOSIS — E11.9 TYPE 2 DIABETES MELLITUS WITHOUT COMPLICATIONS: ICD-10-CM

## 2018-07-17 DIAGNOSIS — E78.5 HYPERLIPIDEMIA, UNSPECIFIED: ICD-10-CM

## 2018-07-17 DIAGNOSIS — Z79.4 LONG TERM (CURRENT) USE OF INSULIN: ICD-10-CM

## 2018-07-17 DIAGNOSIS — Z41.9 ENCOUNTER FOR PROCEDURE FOR PURPOSES OTHER THAN REMEDYING HEALTH STATE, UNSPECIFIED: Chronic | ICD-10-CM

## 2018-07-17 LAB
ALBUMIN SERPL ELPH-MCNC: 3.1 G/DL — LOW (ref 3.3–5)
ALP SERPL-CCNC: 113 U/L — SIGNIFICANT CHANGE UP (ref 40–120)
ALT FLD-CCNC: 17 U/L — SIGNIFICANT CHANGE UP (ref 12–78)
ANION GAP SERPL CALC-SCNC: 6 MMOL/L — SIGNIFICANT CHANGE UP (ref 5–17)
AST SERPL-CCNC: 6 U/L — LOW (ref 15–37)
BASOPHILS # BLD AUTO: 0.03 K/UL — SIGNIFICANT CHANGE UP (ref 0–0.2)
BASOPHILS NFR BLD AUTO: 0.3 % — SIGNIFICANT CHANGE UP (ref 0–2)
BILIRUB SERPL-MCNC: 0.3 MG/DL — SIGNIFICANT CHANGE UP (ref 0.2–1.2)
BUN SERPL-MCNC: 34 MG/DL — HIGH (ref 7–23)
CALCIUM SERPL-MCNC: 8.7 MG/DL — SIGNIFICANT CHANGE UP (ref 8.5–10.1)
CHLORIDE SERPL-SCNC: 104 MMOL/L — SIGNIFICANT CHANGE UP (ref 96–108)
CK SERPL-CCNC: 27 U/L — SIGNIFICANT CHANGE UP (ref 26–192)
CO2 SERPL-SCNC: 26 MMOL/L — SIGNIFICANT CHANGE UP (ref 22–31)
CREAT SERPL-MCNC: 0.99 MG/DL — SIGNIFICANT CHANGE UP (ref 0.5–1.3)
EOSINOPHIL # BLD AUTO: 0.54 K/UL — HIGH (ref 0–0.5)
EOSINOPHIL NFR BLD AUTO: 6.2 % — HIGH (ref 0–6)
GLUCOSE SERPL-MCNC: 206 MG/DL — HIGH (ref 70–99)
HCT VFR BLD CALC: 33.2 % — LOW (ref 34.5–45)
HGB BLD-MCNC: 11.6 G/DL — SIGNIFICANT CHANGE UP (ref 11.5–15.5)
IMM GRANULOCYTES NFR BLD AUTO: 0.5 % — SIGNIFICANT CHANGE UP (ref 0–1.5)
LYMPHOCYTES # BLD AUTO: 1.21 K/UL — SIGNIFICANT CHANGE UP (ref 1–3.3)
LYMPHOCYTES # BLD AUTO: 13.8 % — SIGNIFICANT CHANGE UP (ref 13–44)
MCHC RBC-ENTMCNC: 32.7 PG — SIGNIFICANT CHANGE UP (ref 27–34)
MCHC RBC-ENTMCNC: 34.9 GM/DL — SIGNIFICANT CHANGE UP (ref 32–36)
MCV RBC AUTO: 93.5 FL — SIGNIFICANT CHANGE UP (ref 80–100)
MONOCYTES # BLD AUTO: 0.88 K/UL — SIGNIFICANT CHANGE UP (ref 0–0.9)
MONOCYTES NFR BLD AUTO: 10.1 % — SIGNIFICANT CHANGE UP (ref 2–14)
NEUTROPHILS # BLD AUTO: 6.05 K/UL — SIGNIFICANT CHANGE UP (ref 1.8–7.4)
NEUTROPHILS NFR BLD AUTO: 69.1 % — SIGNIFICANT CHANGE UP (ref 43–77)
NRBC # BLD: 0 /100 WBCS — SIGNIFICANT CHANGE UP (ref 0–0)
PLATELET # BLD AUTO: 228 K/UL — SIGNIFICANT CHANGE UP (ref 150–400)
POTASSIUM SERPL-MCNC: 4.2 MMOL/L — SIGNIFICANT CHANGE UP (ref 3.5–5.3)
POTASSIUM SERPL-SCNC: 4.2 MMOL/L — SIGNIFICANT CHANGE UP (ref 3.5–5.3)
PROT SERPL-MCNC: 7.1 GM/DL — SIGNIFICANT CHANGE UP (ref 6–8.3)
RBC # BLD: 3.55 M/UL — LOW (ref 3.8–5.2)
RBC # FLD: 12.5 % — SIGNIFICANT CHANGE UP (ref 10.3–14.5)
SODIUM SERPL-SCNC: 136 MMOL/L — SIGNIFICANT CHANGE UP (ref 135–145)
TROPONIN I SERPL-MCNC: <0.015 NG/ML — SIGNIFICANT CHANGE UP (ref 0.01–0.04)
WBC # BLD: 8.75 K/UL — SIGNIFICANT CHANGE UP (ref 3.8–10.5)
WBC # FLD AUTO: 8.75 K/UL — SIGNIFICANT CHANGE UP (ref 3.8–10.5)

## 2018-07-17 PROCEDURE — 93010 ELECTROCARDIOGRAM REPORT: CPT

## 2018-07-17 PROCEDURE — 99285 EMERGENCY DEPT VISIT HI MDM: CPT

## 2018-07-17 PROCEDURE — 71045 X-RAY EXAM CHEST 1 VIEW: CPT | Mod: 26

## 2018-07-17 RX ORDER — SODIUM CHLORIDE 9 MG/ML
3 INJECTION INTRAMUSCULAR; INTRAVENOUS; SUBCUTANEOUS ONCE
Qty: 0 | Refills: 0 | Status: COMPLETED | OUTPATIENT
Start: 2018-07-17 | End: 2018-07-17

## 2018-07-17 RX ORDER — NITROGLYCERIN 6.5 MG
0.4 CAPSULE, EXTENDED RELEASE ORAL
Qty: 0 | Refills: 0 | Status: DISCONTINUED | OUTPATIENT
Start: 2018-07-17 | End: 2018-07-18

## 2018-07-17 RX ADMIN — Medication 0.4 MILLIGRAM(S): at 22:45

## 2018-07-17 RX ADMIN — SODIUM CHLORIDE 3 MILLILITER(S): 9 INJECTION INTRAMUSCULAR; INTRAVENOUS; SUBCUTANEOUS at 22:46

## 2018-07-17 NOTE — ED PROVIDER NOTE - ENMT, MLM
Oropharynx clear, mucous membranes mildly dry. Throat has no vesicles, no oropharyngeal exudates and uvula is midline.

## 2018-07-17 NOTE — ED ADULT TRIAGE NOTE - CHIEF COMPLAINT QUOTE
Pt presents with complaints of chest pain from [facility], at [facility] pt was given nitro with no relief. In route to ED EMS gave two doses of baby aspirin. Pt still has chest pain and back pain. Pt has an IV from EMS in the left FA.

## 2018-07-17 NOTE — ED PROVIDER NOTE - MUSCULOSKELETAL, MLM
Spine appears normal, range of motion is not limited, no muscle or joint tenderness. MOCTEZUMA x4, no focal swelling/tenderness. No chest wall TTP.

## 2018-07-17 NOTE — ED PROVIDER NOTE - PROGRESS NOTE DETAILS
Dr. Alexandre:  Pt's cp improved, + c/o'ing her back pain.  Case signed out to Dr. Sigala:  [] rpt Troponin level: if negative, pt TBD back to NH. 2 neg leo enzymes will d/c back to [facility]

## 2018-07-17 NOTE — ED ADULT TRIAGE NOTE - WEIGHT IN LBS
Pt came to ER due to anxiety after   last night. Pt reports nausea, anxiety, depression, today,  and chronic chest pain since surgery for valve replacement on 17   199.9

## 2018-07-17 NOTE — ED PROVIDER NOTE - MEDICAL DECISION MAKING DETAILS
89 y/o F undergoing rehab s/p lumbar compression fractures, history of anxiety BIBA from nursing home regarding worsening anxiety followed by chest heaviness and SOB. Improved by time of ED arrival, but still present. Plan for EKG, labs, CXR, monitor, pulse ox, sublingual nitro, observe, reassess. Expect TBD to nursing home is results are negative.

## 2018-07-17 NOTE — ED PROVIDER NOTE - OBJECTIVE STATEMENT
89 y/o female with a PMHx of breast CA, DVT, GERD, HTN, HLD, and DM presents to the ED from [facility] c/o chest pain around 2000. Son at bedside states pt has been experiencing a lot of anxiety and stressors at home as well. Pt states she was emotionally distressed, became anxious, and then developed sudden moderate pain with associated SOB. EMS administered Nitroglycerin with some relief. Pain is described as a pressure, "like something is sitting on my chest." Pt is currently at rehab at [facility] s/p lumbar vertebral compression fractures. Pt states the back pain is very severe upon movement. No other acute complaints at time of eval. PCP: Dr. Alonso Irvin.

## 2018-07-17 NOTE — ED ADULT NURSE NOTE - CHIEF COMPLAINT QUOTE
Pt presents with complaints of chest pain from [facility], at [facility] pt was given nitro with no relief. In route to ED EMS gave two doses of baby aspirin. Pt still has chest pain and back pain. Pt has an IV from EMS in the left FA.

## 2018-07-18 VITALS
RESPIRATION RATE: 17 BRPM | DIASTOLIC BLOOD PRESSURE: 48 MMHG | HEART RATE: 69 BPM | OXYGEN SATURATION: 94 % | SYSTOLIC BLOOD PRESSURE: 130 MMHG

## 2018-07-18 PROBLEM — S42.91XA FRACTURE OF RIGHT SHOULDER GIRDLE, PART UNSPECIFIED, INITIAL ENCOUNTER FOR CLOSED FRACTURE: Chronic | Status: ACTIVE | Noted: 2017-11-29

## 2018-07-18 PROBLEM — K21.9 GASTRO-ESOPHAGEAL REFLUX DISEASE WITHOUT ESOPHAGITIS: Chronic | Status: ACTIVE | Noted: 2017-11-29

## 2018-07-18 PROBLEM — I70.90 UNSPECIFIED ATHEROSCLEROSIS: Chronic | Status: ACTIVE | Noted: 2017-11-29

## 2018-07-18 PROBLEM — H91.90 UNSPECIFIED HEARING LOSS, UNSPECIFIED EAR: Chronic | Status: ACTIVE | Noted: 2017-11-29

## 2018-07-18 PROBLEM — E11.9 TYPE 2 DIABETES MELLITUS WITHOUT COMPLICATIONS: Chronic | Status: ACTIVE | Noted: 2017-11-29

## 2018-07-18 PROBLEM — I10 ESSENTIAL (PRIMARY) HYPERTENSION: Chronic | Status: ACTIVE | Noted: 2017-06-12

## 2018-07-18 PROBLEM — H40.9 UNSPECIFIED GLAUCOMA: Chronic | Status: ACTIVE | Noted: 2017-11-29

## 2018-07-18 PROBLEM — E78.5 HYPERLIPIDEMIA, UNSPECIFIED: Chronic | Status: ACTIVE | Noted: 2017-06-12

## 2018-07-18 PROBLEM — C50.919 MALIGNANT NEOPLASM OF UNSPECIFIED SITE OF UNSPECIFIED FEMALE BREAST: Chronic | Status: ACTIVE | Noted: 2017-11-29

## 2018-07-18 PROBLEM — I82.409 ACUTE EMBOLISM AND THROMBOSIS OF UNSPECIFIED DEEP VEINS OF UNSPECIFIED LOWER EXTREMITY: Chronic | Status: ACTIVE | Noted: 2017-06-12

## 2018-07-18 RX ORDER — TRAMADOL HYDROCHLORIDE 50 MG/1
50 TABLET ORAL ONCE
Qty: 0 | Refills: 0 | Status: DISCONTINUED | OUTPATIENT
Start: 2018-07-18 | End: 2018-07-18

## 2018-07-18 RX ADMIN — TRAMADOL HYDROCHLORIDE 50 MILLIGRAM(S): 50 TABLET ORAL at 00:35

## 2018-07-18 RX ADMIN — TRAMADOL HYDROCHLORIDE 50 MILLIGRAM(S): 50 TABLET ORAL at 01:10

## 2018-07-18 NOTE — ED ADULT NURSE REASSESSMENT NOTE - NS ED NURSE REASSESS COMMENT FT1
pt c/o lower back pain, pain medication provided. pt is incontinent of urine, linens changed and pt cleaned. awaiting results of 2nd troponin for further disposition.

## 2018-07-23 NOTE — DISCHARGE NOTE ADULT - GO FOR BLOOD TESTS AS DIRECTED. BECAUSE YOUR DOSE IS BASED ON THE PT/INR BLOOD TEST, IT IS VERY IMPORTANT THAT YOU GET YOUR BLOOD TESTED ON THE SCHEDULED DATE AND TIME AND TO KEEP YOUR HEALTH CARE PROVIDER
Problem: Bowel/Gastric:  Goal: Will not experience complications related to bowel motility    Intervention: Implement Bowel Protocol, if applicable  Bowel management protocol implemented. Last BM was PTA         Statement Selected

## 2018-08-10 ENCOUNTER — RX RENEWAL (OUTPATIENT)
Age: 83
End: 2018-08-10

## 2018-08-13 ENCOUNTER — RX RENEWAL (OUTPATIENT)
Age: 83
End: 2018-08-13

## 2018-08-27 ENCOUNTER — RX RENEWAL (OUTPATIENT)
Age: 83
End: 2018-08-27

## 2018-09-11 ENCOUNTER — RX RENEWAL (OUTPATIENT)
Age: 83
End: 2018-09-11

## 2018-09-14 ENCOUNTER — RX RENEWAL (OUTPATIENT)
Age: 83
End: 2018-09-14

## 2018-09-14 NOTE — PHYSICAL THERAPY INITIAL EVALUATION ADULT - PRECAUTIONS/LIMITATIONS, REHAB EVAL
Plan: Recommend getting sunlight at least twice a week
Continue Regimen: Otezla 30mg bid for an additional 3 months
Detail Level: Simple
OOB with brace/fall precautions

## 2018-09-17 ENCOUNTER — RX RENEWAL (OUTPATIENT)
Age: 83
End: 2018-09-17

## 2018-09-27 ENCOUNTER — RX RENEWAL (OUTPATIENT)
Age: 83
End: 2018-09-27

## 2018-10-02 NOTE — ED ADULT NURSE NOTE - NS ED NURSE LEVEL OF CONSCIOUSNESS AFFECT
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

## 2018-10-03 ENCOUNTER — RX RENEWAL (OUTPATIENT)
Age: 83
End: 2018-10-03

## 2018-10-04 ENCOUNTER — RX RENEWAL (OUTPATIENT)
Age: 83
End: 2018-10-04

## 2018-10-11 NOTE — ED PROVIDER NOTE - MUSCULOSKELETAL [+], MLM
Problem: Pain:  Goal: Pain level will decrease  Pain level will decrease   Outcome: Met This Shift    Goal: Control of acute pain  Control of acute pain   Outcome: Met This Shift    Goal: Control of chronic pain  Control of chronic pain   Outcome: Met This Shift      Problem: Falls - Risk of:  Goal: Will remain free from falls  Will remain free from falls   Outcome: Met This Shift      Problem: Risk for Impaired Skin Integrity  Goal: Tissue integrity - skin and mucous membranes  Structural intactness and normal physiological function of skin and  mucous membranes.    Outcome: Met This Shift BACK PAIN/JOINT PAIN/L hip pain, low back pain

## 2018-10-22 ENCOUNTER — RX RENEWAL (OUTPATIENT)
Age: 83
End: 2018-10-22

## 2018-11-12 ENCOUNTER — RX RENEWAL (OUTPATIENT)
Age: 83
End: 2018-11-12

## 2018-11-30 ENCOUNTER — RX RENEWAL (OUTPATIENT)
Age: 83
End: 2018-11-30

## 2018-12-06 ENCOUNTER — APPOINTMENT (OUTPATIENT)
Dept: FAMILY MEDICINE | Facility: CLINIC | Age: 83
End: 2018-12-06

## 2018-12-20 ENCOUNTER — RX RENEWAL (OUTPATIENT)
Age: 83
End: 2018-12-20

## 2018-12-25 ENCOUNTER — RX RENEWAL (OUTPATIENT)
Age: 83
End: 2018-12-25

## 2018-12-30 NOTE — ASU PATIENT PROFILE, ADULT - PRO INTERPRETER NEED 2
English advised to return to ed or call 911 if suicidal or homicidal thoughts develop Let staff know of any worsening withdrawal symptoms experienced.

## 2018-12-31 ENCOUNTER — RX RENEWAL (OUTPATIENT)
Age: 83
End: 2018-12-31

## 2019-01-02 ENCOUNTER — RX RENEWAL (OUTPATIENT)
Age: 84
End: 2019-01-02

## 2019-01-10 ENCOUNTER — RX RENEWAL (OUTPATIENT)
Age: 84
End: 2019-01-10

## 2019-01-11 LAB
ALBUMIN SERPL ELPH-MCNC: 4.3 G/DL
ALP BLD-CCNC: 71 U/L
ALT SERPL-CCNC: 10 U/L
ANION GAP SERPL CALC-SCNC: 9 MMOL/L
AST SERPL-CCNC: 11 U/L
BASOPHILS # BLD AUTO: 0.03 K/UL
BASOPHILS NFR BLD AUTO: 0.4 %
BILIRUB SERPL-MCNC: 0.4 MG/DL
BUN SERPL-MCNC: 26 MG/DL
CALCIUM SERPL-MCNC: 9.9 MG/DL
CHLORIDE SERPL-SCNC: 102 MMOL/L
CHOLEST SERPL-MCNC: 116 MG/DL
CHOLEST/HDLC SERPL: 2.8 RATIO
CO2 SERPL-SCNC: 28 MMOL/L
CREAT SERPL-MCNC: 1.16 MG/DL
CREAT SPEC-SCNC: 51 MG/DL
EOSINOPHIL # BLD AUTO: 0.47 K/UL
EOSINOPHIL NFR BLD AUTO: 6.7 %
GLUCOSE SERPL-MCNC: 100 MG/DL
HBA1C MFR BLD HPLC: 6.6 %
HCT VFR BLD CALC: 39.1 %
HDLC SERPL-MCNC: 41 MG/DL
HGB BLD-MCNC: 12.8 G/DL
IMM GRANULOCYTES NFR BLD AUTO: 0.3 %
LDLC SERPL CALC-MCNC: 50 MG/DL
LYMPHOCYTES # BLD AUTO: 1.29 K/UL
LYMPHOCYTES NFR BLD AUTO: 18.3 %
MAN DIFF?: NORMAL
MCHC RBC-ENTMCNC: 30.5 PG
MCHC RBC-ENTMCNC: 32.7 GM/DL
MCV RBC AUTO: 93.3 FL
MICROALBUMIN 24H UR DL<=1MG/L-MCNC: 4 MG/DL
MICROALBUMIN/CREAT 24H UR-RTO: 78 MG/G
MONOCYTES # BLD AUTO: 0.6 K/UL
MONOCYTES NFR BLD AUTO: 8.5 %
NEUTROPHILS # BLD AUTO: 4.65 K/UL
NEUTROPHILS NFR BLD AUTO: 65.8 %
PLATELET # BLD AUTO: 215 K/UL
POTASSIUM SERPL-SCNC: 4.2 MMOL/L
PROT SERPL-MCNC: 7.6 G/DL
RBC # BLD: 4.19 M/UL
RBC # FLD: 13.4 %
SODIUM SERPL-SCNC: 139 MMOL/L
TRIGL SERPL-MCNC: 124 MG/DL
WBC # FLD AUTO: 7.06 K/UL

## 2019-01-12 LAB
T3FREE SERPL-MCNC: 2.58 PG/ML
T4 FREE SERPL-MCNC: 1.3 NG/DL
TSH SERPL-ACNC: 2.67 UIU/ML

## 2019-01-14 ENCOUNTER — RX RENEWAL (OUTPATIENT)
Age: 84
End: 2019-01-14

## 2019-01-23 ENCOUNTER — NON-APPOINTMENT (OUTPATIENT)
Age: 84
End: 2019-01-23

## 2019-01-23 ENCOUNTER — APPOINTMENT (OUTPATIENT)
Dept: FAMILY MEDICINE | Facility: CLINIC | Age: 84
End: 2019-01-23
Payer: MEDICARE

## 2019-01-23 VITALS
DIASTOLIC BLOOD PRESSURE: 60 MMHG | HEIGHT: 64 IN | WEIGHT: 151 LBS | BODY MASS INDEX: 25.78 KG/M2 | SYSTOLIC BLOOD PRESSURE: 110 MMHG

## 2019-01-23 PROCEDURE — 99213 OFFICE O/P EST LOW 20 MIN: CPT | Mod: 25

## 2019-01-23 PROCEDURE — G0439: CPT

## 2019-01-23 PROCEDURE — 93000 ELECTROCARDIOGRAM COMPLETE: CPT

## 2019-01-23 NOTE — HISTORY OF PRESENT ILLNESS
[FreeTextEntry1] : Non fasting CPE, pt. already had bwk. Son states that pt getting more angry. Occasionally with acting out.  Some times becomes paranoid\par Sometimes becomes combative.

## 2019-01-23 NOTE — PHYSICAL EXAM

## 2019-01-24 ENCOUNTER — RX RENEWAL (OUTPATIENT)
Age: 84
End: 2019-01-24

## 2019-02-04 ENCOUNTER — RX RENEWAL (OUTPATIENT)
Age: 84
End: 2019-02-04

## 2019-02-11 ENCOUNTER — RX RENEWAL (OUTPATIENT)
Age: 84
End: 2019-02-11

## 2019-02-20 ENCOUNTER — RX RENEWAL (OUTPATIENT)
Age: 84
End: 2019-02-20

## 2019-02-21 RX ORDER — CAYENNE 450 MG
CAPSULE ORAL
Refills: 0 | Status: DISCONTINUED | COMMUNITY
End: 2019-02-21

## 2019-02-21 RX ORDER — OSELTAMIVIR PHOSPHATE 75 MG/1
75 CAPSULE ORAL TWICE DAILY
Qty: 10 | Refills: 0 | Status: DISCONTINUED | COMMUNITY
Start: 2018-01-03 | End: 2019-02-21

## 2019-02-21 RX ORDER — DICLOFENAC SODIUM 10 MG/G
1 GEL TOPICAL
Qty: 1 | Refills: 3 | Status: DISCONTINUED | COMMUNITY
Start: 2017-08-03 | End: 2019-02-21

## 2019-02-21 RX ORDER — GLUCOSAMINE HCL/CHONDROITIN SU 500-400 MG
3 CAPSULE ORAL
Refills: 0 | Status: DISCONTINUED | COMMUNITY
End: 2019-02-21

## 2019-02-21 RX ORDER — BESIFLOXACIN 6 MG/ML
0.6 SUSPENSION OPHTHALMIC
Qty: 5 | Refills: 0 | Status: DISCONTINUED | COMMUNITY
Start: 2017-10-30 | End: 2019-02-21

## 2019-02-21 RX ORDER — PSYLLIUM SEED
58.6 PACKET (EA) ORAL
Refills: 0 | Status: DISCONTINUED | COMMUNITY
End: 2019-02-21

## 2019-02-21 RX ORDER — BROMFENAC SODIUM 0.7 MG/ML
0.07 SOLUTION/ DROPS OPHTHALMIC
Qty: 3 | Refills: 0 | Status: DISCONTINUED | COMMUNITY
Start: 2017-10-30 | End: 2019-02-21

## 2019-02-21 RX ORDER — DOCUSATE SODIUM 100 MG/1
100 CAPSULE, LIQUID FILLED ORAL
Qty: 180 | Refills: 3 | Status: DISCONTINUED | COMMUNITY
Start: 2019-02-04 | End: 2019-02-21

## 2019-03-04 ENCOUNTER — RX RENEWAL (OUTPATIENT)
Age: 84
End: 2019-03-04

## 2019-03-12 ENCOUNTER — RX RENEWAL (OUTPATIENT)
Age: 84
End: 2019-03-12

## 2019-03-18 ENCOUNTER — RX RENEWAL (OUTPATIENT)
Age: 84
End: 2019-03-18

## 2019-03-25 ENCOUNTER — RX RENEWAL (OUTPATIENT)
Age: 84
End: 2019-03-25

## 2019-03-27 ENCOUNTER — RX RENEWAL (OUTPATIENT)
Age: 84
End: 2019-03-27

## 2019-03-27 RX ORDER — RANITIDINE HYDROCHLORIDE TABLETS 150MG 150 MG/1
150 TABLET, COATED ORAL
Qty: 180 | Refills: 0 | Status: ACTIVE | COMMUNITY
Start: 1900-01-01 | End: 1900-01-01

## 2019-04-09 ENCOUNTER — RX RENEWAL (OUTPATIENT)
Age: 84
End: 2019-04-09

## 2019-04-15 ENCOUNTER — RX RENEWAL (OUTPATIENT)
Age: 84
End: 2019-04-15

## 2019-04-25 ENCOUNTER — RX RENEWAL (OUTPATIENT)
Age: 84
End: 2019-04-25

## 2019-04-29 ENCOUNTER — RX RENEWAL (OUTPATIENT)
Age: 84
End: 2019-04-29

## 2019-05-16 ENCOUNTER — RX RENEWAL (OUTPATIENT)
Age: 84
End: 2019-05-16

## 2019-05-24 ENCOUNTER — RX RENEWAL (OUTPATIENT)
Age: 84
End: 2019-05-24

## 2019-05-28 ENCOUNTER — RX RENEWAL (OUTPATIENT)
Age: 84
End: 2019-05-28

## 2019-05-29 ENCOUNTER — RX RENEWAL (OUTPATIENT)
Age: 84
End: 2019-05-29

## 2019-05-31 ENCOUNTER — RX RENEWAL (OUTPATIENT)
Age: 84
End: 2019-05-31

## 2019-06-25 ENCOUNTER — RX RENEWAL (OUTPATIENT)
Age: 84
End: 2019-06-25

## 2019-06-27 ENCOUNTER — RX RENEWAL (OUTPATIENT)
Age: 84
End: 2019-06-27

## 2019-07-01 ENCOUNTER — RX RENEWAL (OUTPATIENT)
Age: 84
End: 2019-07-01

## 2019-07-10 ENCOUNTER — RX RENEWAL (OUTPATIENT)
Age: 84
End: 2019-07-10

## 2019-07-12 ENCOUNTER — RX RENEWAL (OUTPATIENT)
Age: 84
End: 2019-07-12

## 2019-07-23 ENCOUNTER — RX RENEWAL (OUTPATIENT)
Age: 84
End: 2019-07-23

## 2019-08-12 ENCOUNTER — RX RENEWAL (OUTPATIENT)
Age: 84
End: 2019-08-12

## 2019-08-19 ENCOUNTER — RX RENEWAL (OUTPATIENT)
Age: 84
End: 2019-08-19

## 2019-09-07 ENCOUNTER — RX RENEWAL (OUTPATIENT)
Age: 84
End: 2019-09-07

## 2019-09-16 ENCOUNTER — RX RENEWAL (OUTPATIENT)
Age: 84
End: 2019-09-16

## 2019-09-24 ENCOUNTER — RX RENEWAL (OUTPATIENT)
Age: 84
End: 2019-09-24

## 2019-10-01 ENCOUNTER — RX RENEWAL (OUTPATIENT)
Age: 84
End: 2019-10-01

## 2019-10-03 ENCOUNTER — RX RENEWAL (OUTPATIENT)
Age: 84
End: 2019-10-03

## 2019-10-05 ENCOUNTER — EMERGENCY (EMERGENCY)
Facility: HOSPITAL | Age: 84
LOS: 0 days | Discharge: HOME | End: 2019-10-05
Attending: STUDENT IN AN ORGANIZED HEALTH CARE EDUCATION/TRAINING PROGRAM
Payer: MEDICARE

## 2019-10-05 ENCOUNTER — RX RENEWAL (OUTPATIENT)
Age: 84
End: 2019-10-05

## 2019-10-05 VITALS
TEMPERATURE: 98 F | RESPIRATION RATE: 16 BRPM | DIASTOLIC BLOOD PRESSURE: 47 MMHG | WEIGHT: 149.03 LBS | HEART RATE: 63 BPM | SYSTOLIC BLOOD PRESSURE: 123 MMHG | OXYGEN SATURATION: 94 % | HEIGHT: 62 IN

## 2019-10-05 VITALS
RESPIRATION RATE: 17 BRPM | SYSTOLIC BLOOD PRESSURE: 131 MMHG | OXYGEN SATURATION: 96 % | DIASTOLIC BLOOD PRESSURE: 54 MMHG | HEART RATE: 62 BPM | TEMPERATURE: 98 F

## 2019-10-05 DIAGNOSIS — M54.2 CERVICALGIA: ICD-10-CM
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
## 2019-10-05 DIAGNOSIS — I10 ESSENTIAL (PRIMARY) HYPERTENSION: ICD-10-CM
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
## 2019-10-05 DIAGNOSIS — Z90.49 ACQUIRED ABSENCE OF OTHER SPECIFIED PARTS OF DIGESTIVE TRACT: Chronic | ICD-10-CM

## 2019-10-05 DIAGNOSIS — E10.9 TYPE 1 DIABETES MELLITUS WITHOUT COMPLICATIONS: ICD-10-CM

## 2019-10-05 DIAGNOSIS — Z41.9 ENCOUNTER FOR PROCEDURE FOR PURPOSES OTHER THAN REMEDYING HEALTH STATE, UNSPECIFIED: Chronic | ICD-10-CM

## 2019-10-05 DIAGNOSIS — S02.2XXA FRACTURE OF NASAL BONES, INITIAL ENCOUNTER FOR CLOSED FRACTURE: ICD-10-CM

## 2019-10-05 DIAGNOSIS — Z90.721 ACQUIRED ABSENCE OF OVARIES, UNILATERAL: Chronic | ICD-10-CM

## 2019-10-05 DIAGNOSIS — Z90.89 ACQUIRED ABSENCE OF OTHER ORGANS: Chronic | ICD-10-CM

## 2019-10-05 DIAGNOSIS — C50.919 MALIGNANT NEOPLASM OF UNSPECIFIED SITE OF UNSPECIFIED FEMALE BREAST: Chronic | ICD-10-CM

## 2019-10-05 DIAGNOSIS — Z79.82 LONG TERM (CURRENT) USE OF ASPIRIN: ICD-10-CM

## 2019-10-05 PROCEDURE — 72125 CT NECK SPINE W/O DYE: CPT | Mod: 26

## 2019-10-05 PROCEDURE — 99284 EMERGENCY DEPT VISIT MOD MDM: CPT | Mod: 25

## 2019-10-05 PROCEDURE — 99284 EMERGENCY DEPT VISIT MOD MDM: CPT

## 2019-10-05 PROCEDURE — 70450 CT HEAD/BRAIN W/O DYE: CPT

## 2019-10-05 PROCEDURE — 70450 CT HEAD/BRAIN W/O DYE: CPT | Mod: 26

## 2019-10-05 PROCEDURE — 72125 CT NECK SPINE W/O DYE: CPT

## 2019-10-05 NOTE — ED PROVIDER NOTE - OBJECTIVE STATEMENT
89 y/o female with a PMHx of DM, DVT, Glaucoma, GERD, HLD, HTN, presents to the ED BIBEMS from [facility] c/o neck pain s/p unwitnessed mechanical fall. States she was leaning over to put a mirror in a drawer, when she hit her head and fell backwards. No LOC. Hx of frequent falls. Has chronic neck pain due to injury in the past, and hx of hip fx. Pt ambulatory in ED with walker. On ASA.

## 2019-10-05 NOTE — ED ADULT NURSE NOTE - NSIMPLEMENTINTERV_GEN_ALL_ED
Implemented All Fall with Harm Risk Interventions:  [place] to call system. Call bell, personal items and telephone within reach. Instruct patient to call for assistance. Room bathroom lighting operational. Non-slip footwear when patient is off stretcher. Physically safe environment: no spills, clutter or unnecessary equipment. Stretcher in lowest position, wheels locked, appropriate side rails in place. Provide visual cue, wrist band, yellow gown, etc. Monitor gait and stability. Monitor for mental status changes and reorient to person, place, and time. Review medications for side effects contributing to fall risk. Reinforce activity limits and safety measures with patient and family. Provide visual clues: red socks.

## 2019-10-05 NOTE — ED PROVIDER NOTE - PATIENT PORTAL LINK FT
You can access the FollowMyHealth Patient Portal offered by [facility] by registering at the following website: [email or web address]. By joining [employer]’s FollowMyHealth portal, you will also be able to view your health information using other applications (apps) compatible with our system.

## 2019-10-05 NOTE — ED ADULT NURSE NOTE - OBJECTIVE STATEMENT
Pt with mechanical fall - states she was leaning over dresser to put something away and lost balance, fell forward and hit head on drawer then fell backwards.  Pt denies LOC.  Pt c/o chronic pain to neck and left hip, states pain is no more than usual pain. Pt with mechanical fall - states she was leaning over dresser to put something away and lost balance, fell forward and hit head on drawer then fell backwards.  Pt denies LOC.  Pt c/o chronic pain to neck and left hip, states pain is no more than usual pain. Trauma alert called by EMS RN, cancelled by Dr. Reddy.

## 2019-10-05 NOTE — ED ADULT TRIAGE NOTE - CHIEF COMPLAINT QUOTE
Pt. to the ED S/P unwitnessed Tripped and Fall- C/O Left Hip Pain and Neck- Pt. states she hit head- Pt. on Aspirin- TA to the ED called by EMS RN @1018am

## 2019-10-05 NOTE — ED PROVIDER NOTE - CLINICAL SUMMARY MEDICAL DECISION MAKING FREE TEXT BOX
89 y/o female with close head injury, neck pain, CT c-spine. 89 y/o female with closed head injury, neck pain, CT head, c-spine.

## 2019-10-05 NOTE — ED PROVIDER NOTE - PROGRESS NOTE DETAILS
Maureen DO: Ambulatory in ED with steady gait with walker that patient uses at home; given copy of CT scans; instructed to f/u with pmd in 1-2 days without fail; strict return precautions given.

## 2019-10-10 ENCOUNTER — RX RENEWAL (OUTPATIENT)
Age: 84
End: 2019-10-10

## 2019-10-17 ENCOUNTER — RX RENEWAL (OUTPATIENT)
Age: 84
End: 2019-10-17

## 2019-10-21 ENCOUNTER — RX RENEWAL (OUTPATIENT)
Age: 84
End: 2019-10-21

## 2019-11-04 ENCOUNTER — RX RENEWAL (OUTPATIENT)
Age: 84
End: 2019-11-04

## 2019-11-12 ENCOUNTER — RX RENEWAL (OUTPATIENT)
Age: 84
End: 2019-11-12

## 2019-11-19 ENCOUNTER — RX RENEWAL (OUTPATIENT)
Age: 84
End: 2019-11-19

## 2019-11-21 ENCOUNTER — INPATIENT (INPATIENT)
Facility: HOSPITAL | Age: 84
LOS: 3 days | Discharge: NURSING, REHAB OR LONG-TERM CARE | DRG: 379 | End: 2019-11-25
Attending: FAMILY MEDICINE | Admitting: FAMILY MEDICINE
Payer: MEDICARE

## 2019-11-21 ENCOUNTER — APPOINTMENT (OUTPATIENT)
Dept: FAMILY MEDICINE | Facility: CLINIC | Age: 84
End: 2019-11-21
Payer: MEDICARE

## 2019-11-21 VITALS
HEART RATE: 58 BPM | OXYGEN SATURATION: 95 % | DIASTOLIC BLOOD PRESSURE: 37 MMHG | WEIGHT: 149.91 LBS | SYSTOLIC BLOOD PRESSURE: 146 MMHG | TEMPERATURE: 98 F | HEIGHT: 64 IN | RESPIRATION RATE: 18 BRPM

## 2019-11-21 VITALS
BODY MASS INDEX: 25.78 KG/M2 | SYSTOLIC BLOOD PRESSURE: 122 MMHG | HEIGHT: 64 IN | WEIGHT: 151 LBS | DIASTOLIC BLOOD PRESSURE: 68 MMHG

## 2019-11-21 DIAGNOSIS — C50.919 MALIGNANT NEOPLASM OF UNSPECIFIED SITE OF UNSPECIFIED FEMALE BREAST: Chronic | ICD-10-CM

## 2019-11-21 DIAGNOSIS — Z90.89 ACQUIRED ABSENCE OF OTHER ORGANS: Chronic | ICD-10-CM

## 2019-11-21 DIAGNOSIS — Z41.9 ENCOUNTER FOR PROCEDURE FOR PURPOSES OTHER THAN REMEDYING HEALTH STATE, UNSPECIFIED: Chronic | ICD-10-CM

## 2019-11-21 DIAGNOSIS — Z90.49 ACQUIRED ABSENCE OF OTHER SPECIFIED PARTS OF DIGESTIVE TRACT: Chronic | ICD-10-CM

## 2019-11-21 DIAGNOSIS — Z90.721 ACQUIRED ABSENCE OF OVARIES, UNILATERAL: Chronic | ICD-10-CM

## 2019-11-21 DIAGNOSIS — R31.29 OTHER MICROSCOPIC HEMATURIA: ICD-10-CM

## 2019-11-21 LAB
BASOPHILS # BLD AUTO: 0.02 K/UL — SIGNIFICANT CHANGE UP (ref 0–0.2)
BASOPHILS NFR BLD AUTO: 0.4 % — SIGNIFICANT CHANGE UP (ref 0–2)
BILIRUB UR QL STRIP: 0
CLARITY UR: NORMAL
COLLECTION METHOD: NORMAL
EOSINOPHIL # BLD AUTO: 0.38 K/UL — SIGNIFICANT CHANGE UP (ref 0–0.5)
EOSINOPHIL NFR BLD AUTO: 6.8 % — HIGH (ref 0–6)
GLUCOSE UR-MCNC: 0
HCG UR QL: 0.2 EU/DL
HCT VFR BLD CALC: 33.4 % — LOW (ref 34.5–45)
HGB BLD-MCNC: 10.8 G/DL — LOW (ref 11.5–15.5)
HGB UR QL STRIP.AUTO: NORMAL
IMM GRANULOCYTES NFR BLD AUTO: 0.4 % — SIGNIFICANT CHANGE UP (ref 0–1.5)
KETONES UR-MCNC: 0
LEUKOCYTE ESTERASE UR QL STRIP: NORMAL
LYMPHOCYTES # BLD AUTO: 1.42 K/UL — SIGNIFICANT CHANGE UP (ref 1–3.3)
LYMPHOCYTES # BLD AUTO: 25.5 % — SIGNIFICANT CHANGE UP (ref 13–44)
MCHC RBC-ENTMCNC: 31.5 PG — SIGNIFICANT CHANGE UP (ref 27–34)
MCHC RBC-ENTMCNC: 32.3 GM/DL — SIGNIFICANT CHANGE UP (ref 32–36)
MCV RBC AUTO: 97.4 FL — SIGNIFICANT CHANGE UP (ref 80–100)
MONOCYTES # BLD AUTO: 0.65 K/UL — SIGNIFICANT CHANGE UP (ref 0–0.9)
MONOCYTES NFR BLD AUTO: 11.7 % — SIGNIFICANT CHANGE UP (ref 2–14)
NEUTROPHILS # BLD AUTO: 3.08 K/UL — SIGNIFICANT CHANGE UP (ref 1.8–7.4)
NEUTROPHILS NFR BLD AUTO: 55.2 % — SIGNIFICANT CHANGE UP (ref 43–77)
NITRITE UR QL STRIP: 0
PH UR STRIP: 5.5
PLATELET # BLD AUTO: 159 K/UL — SIGNIFICANT CHANGE UP (ref 150–400)
PROT UR STRIP-MCNC: 0
RBC # BLD: 3.43 M/UL — LOW (ref 3.8–5.2)
RBC # FLD: 12.3 % — SIGNIFICANT CHANGE UP (ref 10.3–14.5)
SP GR UR STRIP: 1.01
WBC # BLD: 5.57 K/UL — SIGNIFICANT CHANGE UP (ref 3.8–10.5)
WBC # FLD AUTO: 5.57 K/UL — SIGNIFICANT CHANGE UP (ref 3.8–10.5)

## 2019-11-21 PROCEDURE — 99213 OFFICE O/P EST LOW 20 MIN: CPT | Mod: 25

## 2019-11-21 PROCEDURE — 81002 URINALYSIS NONAUTO W/O SCOPE: CPT

## 2019-11-21 RX ORDER — SODIUM CHLORIDE 9 MG/ML
1000 INJECTION INTRAMUSCULAR; INTRAVENOUS; SUBCUTANEOUS ONCE
Refills: 0 | Status: COMPLETED | OUTPATIENT
Start: 2019-11-21 | End: 2019-11-21

## 2019-11-21 NOTE — HISTORY OF PRESENT ILLNESS
[FreeTextEntry8] : Pt. has been bleeding - not sure where it was coming from. . Started yesterday, but seemed a little heavier today. Has been constipated\par Denies hematuria.

## 2019-11-21 NOTE — PHYSICAL EXAM
[Normal] : soft, non-tender, non-distended, no masses palpated, no HSM and normal bowel sounds [FreeTextEntry1] : No bleeding at this time

## 2019-11-21 NOTE — ED ADULT NURSE NOTE - NSIMPLEMENTINTERV_GEN_ALL_ED
Implemented All Fall with Harm Risk Interventions:  [place] to call system. Call bell, personal items and telephone within reach. Instruct patient to call for assistance. Room bathroom lighting operational. Non-slip footwear when patient is off stretcher. Physically safe environment: no spills, clutter or unnecessary equipment. Stretcher in lowest position, wheels locked, appropriate side rails in place. Provide visual cue, wrist band, yellow gown, etc. Monitor gait and stability. Monitor for mental status changes and reorient to person, place, and time. Review medications for side effects contributing to fall risk. Reinforce activity limits and safety measures with patient and family. Provide visual clues: red socks.

## 2019-11-21 NOTE — PLAN
[FreeTextEntry1] : await results of urine culture. If negative and bleeding persists, consider urology referral vs GI referral.

## 2019-11-21 NOTE — ED ADULT TRIAGE NOTE - CHIEF COMPLAINT QUOTE
sent in from nursing home for rectal bleeding x 5 today.  pt c.o lower ABD that started in route to the hospital.

## 2019-11-21 NOTE — REVIEW OF SYSTEMS
[Constipation] : constipation [Negative] : Heme/Lymph [Abdominal Pain] : no abdominal pain [Nausea] : no nausea [Vomiting] : no vomiting [Melena] : no melena

## 2019-11-21 NOTE — ED ADULT NURSE NOTE - OBJECTIVE STATEMENT
pt sent from nursing home. pt had 2 episodes of bleeding while urinating. pt states the blood was in and on the toliet and on the floor. pt saw PMD today (daughter states they did not do a rectal/ vaginal exam) and pt gave a urine sample which was to show high WBCs. pt then had another episode of bleeding after returning to the nursing home and was sent here for further evaluation. pt was not started on antbx for UTI. pt denies palpitation, syncope, dizziness, CP, SOB, fatigue, NVD. hx- right sided mastectomy

## 2019-11-22 DIAGNOSIS — K62.5 HEMORRHAGE OF ANUS AND RECTUM: ICD-10-CM

## 2019-11-22 LAB
ALBUMIN SERPL ELPH-MCNC: 3.1 G/DL — LOW (ref 3.3–5)
ALP SERPL-CCNC: 85 U/L — SIGNIFICANT CHANGE UP (ref 40–120)
ALT FLD-CCNC: 11 U/L — LOW (ref 12–78)
ANION GAP SERPL CALC-SCNC: 3 MMOL/L — LOW (ref 5–17)
APPEARANCE UR: CLEAR — SIGNIFICANT CHANGE UP
APTT BLD: 29.9 SEC — SIGNIFICANT CHANGE UP (ref 27.5–36.3)
AST SERPL-CCNC: 10 U/L — LOW (ref 15–37)
BASOPHILS # BLD AUTO: 0.02 K/UL — SIGNIFICANT CHANGE UP (ref 0–0.2)
BASOPHILS NFR BLD AUTO: 0.4 % — SIGNIFICANT CHANGE UP (ref 0–2)
BILIRUB SERPL-MCNC: 0.2 MG/DL — SIGNIFICANT CHANGE UP (ref 0.2–1.2)
BILIRUB UR-MCNC: NEGATIVE — SIGNIFICANT CHANGE UP
BUN SERPL-MCNC: 37 MG/DL — HIGH (ref 7–23)
CALCIUM SERPL-MCNC: 8.6 MG/DL — SIGNIFICANT CHANGE UP (ref 8.5–10.1)
CHLORIDE SERPL-SCNC: 107 MMOL/L — SIGNIFICANT CHANGE UP (ref 96–108)
CO2 SERPL-SCNC: 28 MMOL/L — SIGNIFICANT CHANGE UP (ref 22–31)
COLOR SPEC: YELLOW — SIGNIFICANT CHANGE UP
CREAT SERPL-MCNC: 1.3 MG/DL — SIGNIFICANT CHANGE UP (ref 0.5–1.3)
DIFF PNL FLD: ABNORMAL
EOSINOPHIL # BLD AUTO: 0.3 K/UL — SIGNIFICANT CHANGE UP (ref 0–0.5)
EOSINOPHIL NFR BLD AUTO: 6.6 % — HIGH (ref 0–6)
GLUCOSE SERPL-MCNC: 156 MG/DL — HIGH (ref 70–99)
GLUCOSE UR QL: NEGATIVE MG/DL — SIGNIFICANT CHANGE UP
HCT VFR BLD CALC: 30.4 % — LOW (ref 34.5–45)
HCT VFR BLD CALC: 30.8 % — LOW (ref 34.5–45)
HCT VFR BLD CALC: 32.3 % — LOW (ref 34.5–45)
HGB BLD-MCNC: 10.1 G/DL — LOW (ref 11.5–15.5)
HGB BLD-MCNC: 10.2 G/DL — LOW (ref 11.5–15.5)
HGB BLD-MCNC: 10.5 G/DL — LOW (ref 11.5–15.5)
IMM GRANULOCYTES NFR BLD AUTO: 0.2 % — SIGNIFICANT CHANGE UP (ref 0–1.5)
INR BLD: 1.03 RATIO — SIGNIFICANT CHANGE UP (ref 0.88–1.16)
KETONES UR-MCNC: NEGATIVE — SIGNIFICANT CHANGE UP
LACTATE SERPL-SCNC: 1.2 MMOL/L — SIGNIFICANT CHANGE UP (ref 0.7–2)
LEUKOCYTE ESTERASE UR-ACNC: NEGATIVE — SIGNIFICANT CHANGE UP
LYMPHOCYTES # BLD AUTO: 1.11 K/UL — SIGNIFICANT CHANGE UP (ref 1–3.3)
LYMPHOCYTES # BLD AUTO: 24.4 % — SIGNIFICANT CHANGE UP (ref 13–44)
MCHC RBC-ENTMCNC: 31.4 PG — SIGNIFICANT CHANGE UP (ref 27–34)
MCHC RBC-ENTMCNC: 31.8 PG — SIGNIFICANT CHANGE UP (ref 27–34)
MCHC RBC-ENTMCNC: 32.1 PG — SIGNIFICANT CHANGE UP (ref 27–34)
MCHC RBC-ENTMCNC: 32.5 GM/DL — SIGNIFICANT CHANGE UP (ref 32–36)
MCHC RBC-ENTMCNC: 32.8 GM/DL — SIGNIFICANT CHANGE UP (ref 32–36)
MCHC RBC-ENTMCNC: 33.6 GM/DL — SIGNIFICANT CHANGE UP (ref 32–36)
MCV RBC AUTO: 95.6 FL — SIGNIFICANT CHANGE UP (ref 80–100)
MCV RBC AUTO: 96.7 FL — SIGNIFICANT CHANGE UP (ref 80–100)
MCV RBC AUTO: 96.9 FL — SIGNIFICANT CHANGE UP (ref 80–100)
MONOCYTES # BLD AUTO: 0.55 K/UL — SIGNIFICANT CHANGE UP (ref 0–0.9)
MONOCYTES NFR BLD AUTO: 12.1 % — SIGNIFICANT CHANGE UP (ref 2–14)
NEUTROPHILS # BLD AUTO: 2.55 K/UL — SIGNIFICANT CHANGE UP (ref 1.8–7.4)
NEUTROPHILS NFR BLD AUTO: 56.3 % — SIGNIFICANT CHANGE UP (ref 43–77)
NITRITE UR-MCNC: NEGATIVE — SIGNIFICANT CHANGE UP
PH UR: 6 — SIGNIFICANT CHANGE UP (ref 5–8)
PLATELET # BLD AUTO: 149 K/UL — LOW (ref 150–400)
PLATELET # BLD AUTO: 150 K/UL — SIGNIFICANT CHANGE UP (ref 150–400)
PLATELET # BLD AUTO: 156 K/UL — SIGNIFICANT CHANGE UP (ref 150–400)
POTASSIUM SERPL-MCNC: 4.1 MMOL/L — SIGNIFICANT CHANGE UP (ref 3.5–5.3)
POTASSIUM SERPL-SCNC: 4.1 MMOL/L — SIGNIFICANT CHANGE UP (ref 3.5–5.3)
PROT SERPL-MCNC: 6.8 GM/DL — SIGNIFICANT CHANGE UP (ref 6–8.3)
PROT UR-MCNC: NEGATIVE MG/DL — SIGNIFICANT CHANGE UP
PROTHROM AB SERPL-ACNC: 11.5 SEC — SIGNIFICANT CHANGE UP (ref 10–12.9)
RBC # BLD: 3.18 M/UL — LOW (ref 3.8–5.2)
RBC # BLD: 3.18 M/UL — LOW (ref 3.8–5.2)
RBC # BLD: 3.34 M/UL — LOW (ref 3.8–5.2)
RBC # FLD: 12.2 % — SIGNIFICANT CHANGE UP (ref 10.3–14.5)
RBC # FLD: 12.2 % — SIGNIFICANT CHANGE UP (ref 10.3–14.5)
RBC # FLD: 12.4 % — SIGNIFICANT CHANGE UP (ref 10.3–14.5)
SODIUM SERPL-SCNC: 138 MMOL/L — SIGNIFICANT CHANGE UP (ref 135–145)
SP GR SPEC: 1.01 — SIGNIFICANT CHANGE UP (ref 1.01–1.02)
UROBILINOGEN FLD QL: NEGATIVE MG/DL — SIGNIFICANT CHANGE UP
WBC # BLD: 4.54 K/UL — SIGNIFICANT CHANGE UP (ref 3.8–10.5)
WBC # BLD: 4.9 K/UL — SIGNIFICANT CHANGE UP (ref 3.8–10.5)
WBC # BLD: 5.23 K/UL — SIGNIFICANT CHANGE UP (ref 3.8–10.5)
WBC # FLD AUTO: 4.54 K/UL — SIGNIFICANT CHANGE UP (ref 3.8–10.5)
WBC # FLD AUTO: 4.9 K/UL — SIGNIFICANT CHANGE UP (ref 3.8–10.5)
WBC # FLD AUTO: 5.23 K/UL — SIGNIFICANT CHANGE UP (ref 3.8–10.5)

## 2019-11-22 PROCEDURE — 97530 THERAPEUTIC ACTIVITIES: CPT | Mod: GP

## 2019-11-22 PROCEDURE — 97162 PT EVAL MOD COMPLEX 30 MIN: CPT | Mod: GP

## 2019-11-22 PROCEDURE — 85027 COMPLETE CBC AUTOMATED: CPT

## 2019-11-22 PROCEDURE — 74174 CTA ABD&PLVS W/CONTRAST: CPT | Mod: 26

## 2019-11-22 PROCEDURE — 97116 GAIT TRAINING THERAPY: CPT | Mod: GP

## 2019-11-22 PROCEDURE — 80048 BASIC METABOLIC PNL TOTAL CA: CPT

## 2019-11-22 PROCEDURE — C9113: CPT

## 2019-11-22 PROCEDURE — 83036 HEMOGLOBIN GLYCOSYLATED A1C: CPT

## 2019-11-22 PROCEDURE — 36415 COLL VENOUS BLD VENIPUNCTURE: CPT

## 2019-11-22 PROCEDURE — 93010 ELECTROCARDIOGRAM REPORT: CPT

## 2019-11-22 PROCEDURE — 99223 1ST HOSP IP/OBS HIGH 75: CPT

## 2019-11-22 PROCEDURE — 82962 GLUCOSE BLOOD TEST: CPT

## 2019-11-22 PROCEDURE — 99223 1ST HOSP IP/OBS HIGH 75: CPT | Mod: AI

## 2019-11-22 RX ORDER — DEXTROSE 50 % IN WATER 50 %
25 SYRINGE (ML) INTRAVENOUS ONCE
Refills: 0 | Status: DISCONTINUED | OUTPATIENT
Start: 2019-11-22 | End: 2019-11-25

## 2019-11-22 RX ORDER — DEXTROSE 50 % IN WATER 50 %
12.5 SYRINGE (ML) INTRAVENOUS ONCE
Refills: 0 | Status: DISCONTINUED | OUTPATIENT
Start: 2019-11-22 | End: 2019-11-25

## 2019-11-22 RX ORDER — GLUCAGON INJECTION, SOLUTION 0.5 MG/.1ML
1 INJECTION, SOLUTION SUBCUTANEOUS ONCE
Refills: 0 | Status: DISCONTINUED | OUTPATIENT
Start: 2019-11-22 | End: 2019-11-25

## 2019-11-22 RX ORDER — ESCITALOPRAM OXALATE 10 MG/1
10 TABLET, FILM COATED ORAL DAILY
Refills: 0 | Status: DISCONTINUED | OUTPATIENT
Start: 2019-11-22 | End: 2019-11-25

## 2019-11-22 RX ORDER — PANTOPRAZOLE SODIUM 20 MG/1
80 TABLET, DELAYED RELEASE ORAL ONCE
Refills: 0 | Status: COMPLETED | OUTPATIENT
Start: 2019-11-22 | End: 2019-11-22

## 2019-11-22 RX ORDER — RANITIDINE HYDROCHLORIDE 150 MG/1
150 TABLET, FILM COATED ORAL
Qty: 0 | Refills: 0 | DISCHARGE

## 2019-11-22 RX ORDER — ONDANSETRON 8 MG/1
4 TABLET, FILM COATED ORAL EVERY 6 HOURS
Refills: 0 | Status: DISCONTINUED | OUTPATIENT
Start: 2019-11-22 | End: 2019-11-25

## 2019-11-22 RX ORDER — TIMOLOL 0.5 %
1 DROPS OPHTHALMIC (EYE)
Refills: 0 | Status: DISCONTINUED | OUTPATIENT
Start: 2019-11-22 | End: 2019-11-25

## 2019-11-22 RX ORDER — BRIMONIDINE TARTRATE 2 MG/MG
1 SOLUTION/ DROPS OPHTHALMIC
Refills: 0 | Status: DISCONTINUED | OUTPATIENT
Start: 2019-11-22 | End: 2019-11-25

## 2019-11-22 RX ORDER — ACETAMINOPHEN 500 MG
650 TABLET ORAL EVERY 4 HOURS
Refills: 0 | Status: DISCONTINUED | OUTPATIENT
Start: 2019-11-22 | End: 2019-11-25

## 2019-11-22 RX ORDER — DEXTROSE 50 % IN WATER 50 %
15 SYRINGE (ML) INTRAVENOUS ONCE
Refills: 0 | Status: DISCONTINUED | OUTPATIENT
Start: 2019-11-22 | End: 2019-11-25

## 2019-11-22 RX ORDER — NIZATIDINE 150 MG/1
1 CAPSULE ORAL
Qty: 0 | Refills: 0 | DISCHARGE

## 2019-11-22 RX ORDER — MULTIVIT-MIN/FERROUS GLUCONATE 9 MG/15 ML
1 LIQUID (ML) ORAL
Qty: 0 | Refills: 0 | DISCHARGE

## 2019-11-22 RX ORDER — CALCIUM CARBONATE 500(1250)
3 TABLET ORAL EVERY 6 HOURS
Refills: 0 | Status: DISCONTINUED | OUTPATIENT
Start: 2019-11-22 | End: 2019-11-25

## 2019-11-22 RX ORDER — BRIMONIDINE TARTRATE, TIMOLOL MALEATE 2; 5 MG/ML; MG/ML
1 SOLUTION/ DROPS OPHTHALMIC
Qty: 0 | Refills: 0 | DISCHARGE

## 2019-11-22 RX ORDER — LOPERAMIDE HCL 2 MG
2 TABLET ORAL EVERY 6 HOURS
Refills: 0 | Status: DISCONTINUED | OUTPATIENT
Start: 2019-11-22 | End: 2019-11-25

## 2019-11-22 RX ORDER — INSULIN LISPRO 100/ML
VIAL (ML) SUBCUTANEOUS
Refills: 0 | Status: DISCONTINUED | OUTPATIENT
Start: 2019-11-22 | End: 2019-11-25

## 2019-11-22 RX ORDER — TRAMADOL HYDROCHLORIDE 50 MG/1
50 TABLET ORAL EVERY 6 HOURS
Refills: 0 | Status: DISCONTINUED | OUTPATIENT
Start: 2019-11-22 | End: 2019-11-25

## 2019-11-22 RX ORDER — TAMSULOSIN HYDROCHLORIDE 0.4 MG/1
0.4 CAPSULE ORAL AT BEDTIME
Refills: 0 | Status: DISCONTINUED | OUTPATIENT
Start: 2019-11-22 | End: 2019-11-25

## 2019-11-22 RX ORDER — PANTOPRAZOLE SODIUM 20 MG/1
8 TABLET, DELAYED RELEASE ORAL
Qty: 80 | Refills: 0 | Status: DISCONTINUED | OUTPATIENT
Start: 2019-11-22 | End: 2019-11-22

## 2019-11-22 RX ORDER — ATORVASTATIN CALCIUM 80 MG/1
10 TABLET, FILM COATED ORAL AT BEDTIME
Refills: 0 | Status: DISCONTINUED | OUTPATIENT
Start: 2019-11-22 | End: 2019-11-25

## 2019-11-22 RX ORDER — TAMSULOSIN HYDROCHLORIDE 0.4 MG/1
1 CAPSULE ORAL
Qty: 0 | Refills: 0 | DISCHARGE

## 2019-11-22 RX ORDER — METHENAMINE MANDELATE 1 G
1 TABLET ORAL
Qty: 0 | Refills: 0 | DISCHARGE

## 2019-11-22 RX ORDER — INSULIN ASPART 100 [IU]/ML
1 INJECTION, SOLUTION SUBCUTANEOUS
Qty: 0 | Refills: 0 | DISCHARGE

## 2019-11-22 RX ORDER — INSULIN LISPRO 100/ML
VIAL (ML) SUBCUTANEOUS AT BEDTIME
Refills: 0 | Status: DISCONTINUED | OUTPATIENT
Start: 2019-11-22 | End: 2019-11-25

## 2019-11-22 RX ORDER — ESCITALOPRAM OXALATE 10 MG/1
1 TABLET, FILM COATED ORAL
Qty: 0 | Refills: 0 | DISCHARGE

## 2019-11-22 RX ORDER — SODIUM CHLORIDE 9 MG/ML
1000 INJECTION, SOLUTION INTRAVENOUS
Refills: 0 | Status: DISCONTINUED | OUTPATIENT
Start: 2019-11-22 | End: 2019-11-24

## 2019-11-22 RX ORDER — LATANOPROST 0.05 MG/ML
1 SOLUTION/ DROPS OPHTHALMIC; TOPICAL AT BEDTIME
Refills: 0 | Status: DISCONTINUED | OUTPATIENT
Start: 2019-11-22 | End: 2019-11-25

## 2019-11-22 RX ORDER — LOSARTAN POTASSIUM 100 MG/1
25 TABLET, FILM COATED ORAL DAILY
Refills: 0 | Status: DISCONTINUED | OUTPATIENT
Start: 2019-11-22 | End: 2019-11-25

## 2019-11-22 RX ORDER — BIMATOPROST 0.3 MG/ML
1 SOLUTION/ DROPS OPHTHALMIC
Qty: 0 | Refills: 0 | DISCHARGE

## 2019-11-22 RX ADMIN — Medication 1 TABLET(S): at 13:50

## 2019-11-22 RX ADMIN — PANTOPRAZOLE SODIUM 10 MG/HR: 20 TABLET, DELAYED RELEASE ORAL at 03:16

## 2019-11-22 RX ADMIN — Medication 1: at 21:27

## 2019-11-22 RX ADMIN — Medication 650 MILLIGRAM(S): at 15:36

## 2019-11-22 RX ADMIN — ESCITALOPRAM OXALATE 10 MILLIGRAM(S): 10 TABLET, FILM COATED ORAL at 18:42

## 2019-11-22 RX ADMIN — ATORVASTATIN CALCIUM 10 MILLIGRAM(S): 80 TABLET, FILM COATED ORAL at 21:28

## 2019-11-22 RX ADMIN — Medication 1 DROP(S): at 17:51

## 2019-11-22 RX ADMIN — Medication 4: at 17:05

## 2019-11-22 RX ADMIN — Medication 650 MILLIGRAM(S): at 16:05

## 2019-11-22 RX ADMIN — TAMSULOSIN HYDROCHLORIDE 0.4 MILLIGRAM(S): 0.4 CAPSULE ORAL at 21:28

## 2019-11-22 RX ADMIN — SODIUM CHLORIDE 1000 MILLILITER(S): 9 INJECTION INTRAMUSCULAR; INTRAVENOUS; SUBCUTANEOUS at 00:25

## 2019-11-22 RX ADMIN — LATANOPROST 1 DROP(S): 0.05 SOLUTION/ DROPS OPHTHALMIC; TOPICAL at 21:28

## 2019-11-22 RX ADMIN — BRIMONIDINE TARTRATE 1 DROP(S): 2 SOLUTION/ DROPS OPHTHALMIC at 17:50

## 2019-11-22 RX ADMIN — PANTOPRAZOLE SODIUM 80 MILLIGRAM(S): 20 TABLET, DELAYED RELEASE ORAL at 03:16

## 2019-11-22 RX ADMIN — SODIUM CHLORIDE 1000 MILLILITER(S): 9 INJECTION INTRAMUSCULAR; INTRAVENOUS; SUBCUTANEOUS at 02:39

## 2019-11-22 NOTE — H&P ADULT - ASSESSMENT
89 year old  female with BPBPR    Assessment:  Acute LGIB  Constipation  Depression  GERD  Possible diverticular bleed  Arthritis  HTN  Hyperlipidemia    Plan:  Admit to medical bed  GI consult  IVF  NPO-->Clear liquids  Insulin coverage only  No DVT prophylaxis due to active bleeding  Follow H/H  further Tx as her condition requires

## 2019-11-22 NOTE — H&P ADULT - NSHPREVIEWOFSYSTEMS_GEN_ALL_CORE
CONSTITUTIONAL: No weakness, fevers or chills  EYES/ENT: No visual changes;  No vertigo or throat pain   NECK: No pain or stiffness  RESPIRATORY: No cough, wheezing, hemoptysis; No shortness of breath  CARDIOVASCULAR: No chest pain or palpitations  GASTROINTESTINAL: No abdominal or epigastric pain. No nausea, vomiting, or hematemesis.  GENITOURINARY: No dysuria, frequency or hematuria  NEUROLOGICAL: No numbness or weakness  SKIN: No itching, burning, rashes, or lesions   All other review of systems is negative unless indicated above

## 2019-11-22 NOTE — PATIENT PROFILE ADULT - VISION (WITH CORRECTIVE LENSES IF THE PATIENT USUALLY WEARS THEM):
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

## 2019-11-22 NOTE — CONSULT NOTE ADULT - ASSESSMENT
89 y/o female admitted with BRBPR, with a normal CTA, noting diverticulosis.     Impression:  ?diverticular bleeding, less likely hemorrhoidal but possible as she was having resent episodes of constipation.      Plan:  Clears now.   Will stop IV PPI drip now and continue to monitor closely.    Trend H/H and transfuse as needed.    Hope gi bleeding continues to slow down over the weekend, if continues to drop and bleed may need a colonoscopy?     Discussed with pt, Dr. Irvin, Dr. Kelly.

## 2019-11-22 NOTE — ED PROVIDER NOTE - OBJECTIVE STATEMENT
89 yo female with h/o breast cancer, DM, DVT, HLD, HTN c/o rectal bleeding.  Pt noted blood when urinating earlier on Thursday.  Went to see Dr Irvin and UA 89 yo female with h/o breast cancer, DM, DVT, HLD, HTN c/o rectal bleeding.  Pt noted blood when urinating earlier on Thursday.  Went to see Dr Irvin and UA was done in the office and sent for a culture.  Patient went home and had two more episodes of bleeding.  Seems to her that the bleeding only happens when she sits to urinate on the toilet.  Has some mild suprapubic pain and chronic low back pain.  No fever.  No n/v.  Hasn't had a colonoscopy in many many years, was seen by Dr Robin PAULINO.

## 2019-11-22 NOTE — H&P ADULT - NSHPLABSRESULTS_GEN_ALL_CORE
10.1   4.54  )-----------( 149      ( 2019 04:18 )             30.8     11-21    138  |  107  |  37<H>  ----------------------------<  156<H>  4.1   |  28  |  1.30    Ca    8.6      2019 23:25    TPro  6.8  /  Alb  3.1<L>  /  TBili  0.2  /  DBili  x   /  AST  10<L>  /  ALT  11<L>  /  AlkPhos  85  11-21    Urinalysis Basic - ( 2019 00:13 )    Color: Yellow / Appearance: Clear / S.010 / pH: x  Gluc: x / Ketone: Negative  / Bili: Negative / Urobili: Negative mg/dL   Blood: x / Protein: Negative mg/dL / Nitrite: Negative   Leuk Esterase: Negative / RBC: 0-2 /HPF / WBC 0-2   Sq Epi: x / Non Sq Epi: Moderate / Bacteria: Occasional    PT/INR - ( 2019 23:25 )   PT: 11.5 sec;   INR: 1.03 ratio    PTT - ( 2019 23:25 )  PTT:29.9 sec    CAPILLARY BLOOD GLUCOSE  POCT Blood Glucose.: 99 mg/dL (2019 11:38)

## 2019-11-22 NOTE — INPATIENT CERTIFICATION FOR MEDICARE PATIENTS - RISKS OF ADVERSE EVENTS
How Severe Is Your Skin Lesion?: mild Has Your Skin Lesion Been Treated?: not been treated Is This A New Presentation, Or A Follow-Up?: Skin Lesion Additional History: Pt thinks it’s irritation could have gotten it due to his watch hemmorhage/Other:/Concern for cardiopulmonary deterioration

## 2019-11-22 NOTE — H&P ADULT - NSHPPHYSICALEXAM_GEN_ALL_CORE
Vital Signs Last 24 Hrs  T(C): 36.6 (22 Nov 2019 12:39), Max: 36.7 (22 Nov 2019 02:49)  T(F): 97.9 (22 Nov 2019 12:39), Max: 98.1 (22 Nov 2019 11:34)  HR: 57 (22 Nov 2019 12:39) (55 - 61)  BP: 107/93 (22 Nov 2019 12:39) (107/93 - 160/48)  BP(mean): --  RR: 17 (22 Nov 2019 12:39) (17 - 18)  SpO2: 94% (22 Nov 2019 12:39) (93% - 95%)  Constitutional: NAD, awake and alert, well-developed  HEENT: PERRLA, EOMI, Pharynx Clear  Neck: Supple, No JVD, No Lymphadenopathy  Respiratory: Breath sounds are clear bilaterally, No wheezing, rales or rhonchi  Cardiovascular: S1 and S2, regular rate and rhythm, no Murmurs, rubs or gallops  Gastrointestinal: Bowel Sounds present, soft, nontender. No Hepatosplenomegaly. No masses  Extremities: Without clubbing, cyanosis or edema  Vascular: 2+ peripheral pulses  Neurological: A/O x 3, no focal deficits  Musculoskeletal: FROM upper and lower extremities  Skin: No rashes

## 2019-11-22 NOTE — H&P ADULT - HISTORY OF PRESENT ILLNESS
89 yo female with h/o breast cancer, DM, DVT, HLD, HTN c/o rectal bleeding.  Pt noted blood to undergarments. Not sure where earlier on Thursday.  Went to see  Dr Irvin and UA was done in the office. Showed small blood and sent for a culture.  Patient went home and had two episodes of bleeding, this time appeared to be from rectum.  Seems to her that the bleeding only happens when she sits to urinate on the toilet.  Has some mild suprapubic pain and chronic low back pain.  No fever.  No n/v.  Hasn't had a colonoscopy in many many years, was seen by Dr Robin PAULINO.      Active Problems  Arthritis (716.90) (M19.90)  Atherosclerosis of native coronary artery (414.01) (I25.10)  Bilateral hearing loss (389.9) (H91.93)  Breast cancer (174.9) (C50.919)  Cataract, bilateral (366.9) (H26.9)  Constipation, chronic (564.00) (K59.09)  Depression with anxiety (300.4) (F41.8)  GERD (gastroesophageal reflux disease) (530.81) (K21.9)  Glaucoma (365.9) (H40.9)  Hip fracture (820.8) (S72.009A)  HTN (hypertension) (401.9) (I10)  Hyperlipidemia (272.4) (E78.5)  Influenza A (487.1) (J10.1)  Need for prophylactic vaccination and inoculation against influenza (V04.81) (Z23)  PPD positive (795.51) (R76.11)  Preop cardiovascular exam (V72.81) (Z01.810)  Pulmonary nodule (793.11) (R91.1)  Type 1 diabetes (250.01) (E10.9)  URI, acute (465.9) (J06.9)  UTI symptoms (788.99) (R39.9)  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)    Past Medical History  History of deep vein thrombosis (DVT) of lower extremity (V12.51) (Z86.718)  No pertinent past medical history    Surgical History  History of Appendectomy  History of Breast Surgery  · -Mastectomy  History of Hip Surgery  · -Open Reduction Internal Fixation (ORIF)  -Left Hip Fracture  History of Oophorectomy For Ectopic Pregnancy Left Ovary  History of Tonsillectomy    Family History  Family history of breast cancer (V16.3) (Z80.3) : Sister  Family history of leukemia (V16.6) (Z80.6) : Mother  Family history of malignant neoplasm of larynx (V16.2) (Z80.2) : Brother  FHx: prostate cancer (V16.42) (Z80.42) : Father, Brother    Social History  Former smoker (V15.82) (Z87.891)  Has 2 children  No illicit drug use  Nursing home resident (V60.6) (Z59.3)  · [facility]  Occasional alcohol use   (V61.07) (Z63.4)    Out Patient  Meds  Artificial Tears PF 0.1-0.3 % Ophthalmic Solution; INSTILL 1 DROP 2 times daily each  eye as needed for dry eyes  Aspir-Low 81 MG Oral Tablet Delayed Release; TAKE 1 TABLET BY MOUTH EVERY DAY  Atorvastatin Calcium 10 MG Oral Tablet; TAKE 1 TABLET DAILY AT BEDTIME  Mellissa Contour Next Test STRP; TEST TWICE DAILY  Colace 100 MG Oral Capsule; TAKE 1 CAPSULE 3 TIMES DAILY  Combigan 0.2-0.5 % Ophthalmic Solution; INSTILL 1 DROP, TWICE DAILY  Contour Test In Vitro Strip; USE 1 STRIP 3 TIMES DAILY  Daily Vitamin Oral Tablet; TAKE 1 TABLET DAILY  Durezol 0.05 % Ophthalmic Emulsion  EQ Acid Reducer 150 MG Oral Tablet; Take 1 tablet twice daily  Escitalopram Oxalate 10 MG Oral Tablet; TAKE 1 TABLET BY MOUTH every DAY  Glucagon Emergency 1 MG Injection Kit; USE AS DIRECTED  HumaLOG KwikPen 100 UNIT/ML Subcutaneous Solution Pen-injector; INJECT  SUBCUTANEOUSLY AS DIRECTED: 3 TIMES DAILY, AS PER SLIDING  SCALE  ICaps Areds 2 CAPS; TAKE AS DIRECTED: TAKE 1 CAPSULE TWICE DAILY  Losartan Potassium 25 MG Oral Tablet; Take 1 tablet daily  Lumigan 0.01 % Ophthalmic Solution; INSTILL 1 DROP, DAILY  Methenamine Hippurate 1 GM Oral Tablet; TAKE 1 TABLET TWICE DAILY  Nizatidine 150 MG Oral Capsule; TAKE 1 CAPSULE AT BEDTIME NIGHTLY  Pen Needles 32G X 4 MM; inject ttreee times daily  PenTips 32G X 4 MM; 'INJECT TTREEE TIMES DAILY  Preferred Plus Unifine Pentips 31G X 6 MM  raNITIdine HCl - 150 MG Oral Tablet; Take 1 tablet every 12 hours  Senna 8.6 MG Oral Capsule; TAKE AS DIRECTED: TAKE 2 CAPSULES AT BEDTIME  Tamsulosin HCl - 0.4 MG Oral Capsule; TAKE 1 CAPSULE Daily  TheraCran  MG Oral Capsule; TAKE 1 CAPSULE TWICE DAILY  Timolol Maleate 0.5 % Ophthalmic Solution; INSTILL 1 DROP INTO BOTH EYES 2 TIMES  DAILY  Jim SoloStar 300 UNIT/ML Subcutaneous Solution Pen-injector; 'INJECT 40 UNIT  DAILY AS DIRECTED  traMADol HCl - 50 MG Oral Tablet; TAKE 1 TABLET Twice daily MDD:2 tab  Tylenol Extra Strength 500 MG Oral Tablet; Take 1 tablet at noon daily    Allergies  No Known Drug Allergies

## 2019-11-22 NOTE — CONSULT NOTE ADULT - SUBJECTIVE AND OBJECTIVE BOX
Patient is a 89y old  Female who presents with a chief complaint of BRBPR.    HPI: 89y/o female who reports x1 large amount of painless rectal bleeding.  Pt reports prior to this episode feeling constipated.  She otherwise feels fine without any complaints.  Denies any fevers, chills, CP, SOB, abdominal pain, n/v, or melena.      PAST MEDICAL & SURGICAL HISTORY:  Shoulder fracture, right  DM (diabetes mellitus): type I  Glaucoma  GERD (gastroesophageal reflux disease)  Breast cancer: right  Hearing loss: b/l  Atherosclerosis  DVT (deep venous thrombosis)  HLD (hyperlipidemia)  HTN (hypertension)  History of tonsillectomy  H/O unilateral oophorectomy: left ovary  Elective surgery: left hip surgery from fracture  S/P betzy  Breast cancer: right massectomy    MEDICATIONS  (STANDING):  pantoprazole Infusion 8 mG/Hr (10 mL/Hr) IV Continuous <Continuous>    MEDICATIONS  (PRN):    Allergies  epinephrine (Anaphylaxis;)  Originally Entered as [Unknown] reaction to [any anestethics other than lido] (Unknown)    FAMILY HISTORY:  No pertinent family history in first degree relatives    REVIEW OF SYSTEMS:  CONSTITUTIONAL: No weakness, fevers or chills  RESPIRATORY: No cough, wheezing, hemoptysis; No shortness of breath  CARDIOVASCULAR: No chest pain or palpitations  GASTROINTESTINAL: Per HPI.   GENITOURINARY: No dysuria, frequency or hematuria  NEUROLOGICAL: No numbness or weakness  SKIN: No itching, burning, rashes, or lesions   PSYCH: Normal mood and affect  All other review of systems is negative unless indicated above.  Vital Signs Last 24 Hrs  T(C): 36.4 (22 Nov 2019 06:39), Max: 36.7 (22 Nov 2019 02:49)  T(F): 97.6 (22 Nov 2019 06:39), Max: 98 (22 Nov 2019 02:49)  HR: 56 (22 Nov 2019 06:39) (55 - 58)  BP: 138/55 (22 Nov 2019 06:39) (138/55 - 153/52)  BP(mean): --  RR: 18 (22 Nov 2019 03:19) (18 - 18)  SpO2: 95% (22 Nov 2019 06:39) (93% - 95%)    PHYSICAL EXAM:  Constitutional: Elderly female in NAD.   Respiratory: CTAB  Cardiovascular: S1 and S2, RRR, no M/G/R  Gastrointestinal: BS+, soft, NT/ND  Extremities: No peripheral edema  Vascular: 2+ peripheral pulses  Neurological: A/O x 3, no focal deficits  Psychiatric: Normal mood, normal affect  Skin: No rashes    LABS:                        10.1   4.54  )-----------( 149      ( 22 Nov 2019 04:18 )             30.8     11-21    138  |  107  |  37<H>  ----------------------------<  156<H>  4.1   |  28  |  1.30    Ca    8.6      21 Nov 2019 23:25    TPro  6.8  /  Alb  3.1<L>  /  TBili  0.2  /  DBili  x   /  AST  10<L>  /  ALT  11<L>  /  AlkPhos  85  11-21    PT/INR - ( 21 Nov 2019 23:25 )   PT: 11.5 sec;   INR: 1.03 ratio         PTT - ( 21 Nov 2019 23:25 )  PTT:29.9 sec  LIVER FUNCTIONS - ( 21 Nov 2019 23:25 )  Alb: 3.1 g/dL / Pro: 6.8 gm/dL / ALK PHOS: 85 U/L / ALT: 11 U/L / AST: 10 U/L / GGT: x             RADIOLOGY & ADDITIONAL STUDIES:

## 2019-11-22 NOTE — ED PROVIDER NOTE - PROGRESS NOTE DETAILS
case d/w Dr Irvin, will keep pt in hospital, wants GI consult Dr Kelly's group to help determine ultimate placement location.  will admit med/surg for now with bridge do not move and will change location as Dr Irvin/GI prefer.

## 2019-11-22 NOTE — CHART NOTE - NSCHARTNOTEFT_GEN_A_CORE
See NP for details. 89-year-old female with painless rectal bleeding. CT angiography was normal. Patient has not had any further bleeding hematocrit is stable.  Lungs clear, cardiac exam normal. Abdomen soft nontender. Continue observation.

## 2019-11-23 LAB
ANION GAP SERPL CALC-SCNC: 7 MMOL/L — SIGNIFICANT CHANGE UP (ref 5–17)
BUN SERPL-MCNC: 19 MG/DL — SIGNIFICANT CHANGE UP (ref 7–23)
CALCIUM SERPL-MCNC: 8.5 MG/DL — SIGNIFICANT CHANGE UP (ref 8.5–10.1)
CHLORIDE SERPL-SCNC: 110 MMOL/L — HIGH (ref 96–108)
CO2 SERPL-SCNC: 26 MMOL/L — SIGNIFICANT CHANGE UP (ref 22–31)
CREAT SERPL-MCNC: 0.99 MG/DL — SIGNIFICANT CHANGE UP (ref 0.5–1.3)
CULTURE RESULTS: NO GROWTH — SIGNIFICANT CHANGE UP
GLUCOSE SERPL-MCNC: 126 MG/DL — HIGH (ref 70–99)
HBA1C BLD-MCNC: 6.3 % — HIGH (ref 4–5.6)
HCT VFR BLD CALC: 31 % — LOW (ref 34.5–45)
HCT VFR BLD CALC: 31.7 % — LOW (ref 34.5–45)
HGB BLD-MCNC: 10 G/DL — LOW (ref 11.5–15.5)
HGB BLD-MCNC: 10.3 G/DL — LOW (ref 11.5–15.5)
MCHC RBC-ENTMCNC: 31.4 PG — SIGNIFICANT CHANGE UP (ref 27–34)
MCHC RBC-ENTMCNC: 31.4 PG — SIGNIFICANT CHANGE UP (ref 27–34)
MCHC RBC-ENTMCNC: 32.3 GM/DL — SIGNIFICANT CHANGE UP (ref 32–36)
MCHC RBC-ENTMCNC: 32.5 GM/DL — SIGNIFICANT CHANGE UP (ref 32–36)
MCV RBC AUTO: 96.6 FL — SIGNIFICANT CHANGE UP (ref 80–100)
MCV RBC AUTO: 97.5 FL — SIGNIFICANT CHANGE UP (ref 80–100)
PLATELET # BLD AUTO: 144 K/UL — LOW (ref 150–400)
PLATELET # BLD AUTO: 151 K/UL — SIGNIFICANT CHANGE UP (ref 150–400)
POTASSIUM SERPL-MCNC: 4.1 MMOL/L — SIGNIFICANT CHANGE UP (ref 3.5–5.3)
POTASSIUM SERPL-SCNC: 4.1 MMOL/L — SIGNIFICANT CHANGE UP (ref 3.5–5.3)
RBC # BLD: 3.18 M/UL — LOW (ref 3.8–5.2)
RBC # BLD: 3.28 M/UL — LOW (ref 3.8–5.2)
RBC # FLD: 12.2 % — SIGNIFICANT CHANGE UP (ref 10.3–14.5)
RBC # FLD: 12.3 % — SIGNIFICANT CHANGE UP (ref 10.3–14.5)
SODIUM SERPL-SCNC: 143 MMOL/L — SIGNIFICANT CHANGE UP (ref 135–145)
SPECIMEN SOURCE: SIGNIFICANT CHANGE UP
WBC # BLD: 5 K/UL — SIGNIFICANT CHANGE UP (ref 3.8–10.5)
WBC # BLD: 5.66 K/UL — SIGNIFICANT CHANGE UP (ref 3.8–10.5)
WBC # FLD AUTO: 5 K/UL — SIGNIFICANT CHANGE UP (ref 3.8–10.5)
WBC # FLD AUTO: 5.66 K/UL — SIGNIFICANT CHANGE UP (ref 3.8–10.5)

## 2019-11-23 PROCEDURE — 99232 SBSQ HOSP IP/OBS MODERATE 35: CPT

## 2019-11-23 RX ORDER — PANTOPRAZOLE SODIUM 20 MG/1
40 TABLET, DELAYED RELEASE ORAL
Refills: 0 | Status: DISCONTINUED | OUTPATIENT
Start: 2019-11-23 | End: 2019-11-25

## 2019-11-23 RX ORDER — INSULIN GLARGINE 100 [IU]/ML
5 INJECTION, SOLUTION SUBCUTANEOUS AT BEDTIME
Refills: 0 | Status: DISCONTINUED | OUTPATIENT
Start: 2019-11-23 | End: 2019-11-25

## 2019-11-23 RX ADMIN — LATANOPROST 1 DROP(S): 0.05 SOLUTION/ DROPS OPHTHALMIC; TOPICAL at 21:34

## 2019-11-23 RX ADMIN — ESCITALOPRAM OXALATE 10 MILLIGRAM(S): 10 TABLET, FILM COATED ORAL at 11:33

## 2019-11-23 RX ADMIN — Medication 1 DROP(S): at 05:46

## 2019-11-23 RX ADMIN — ATORVASTATIN CALCIUM 10 MILLIGRAM(S): 80 TABLET, FILM COATED ORAL at 21:35

## 2019-11-23 RX ADMIN — BRIMONIDINE TARTRATE 1 DROP(S): 2 SOLUTION/ DROPS OPHTHALMIC at 05:46

## 2019-11-23 RX ADMIN — Medication 2: at 11:33

## 2019-11-23 RX ADMIN — BRIMONIDINE TARTRATE 1 DROP(S): 2 SOLUTION/ DROPS OPHTHALMIC at 16:55

## 2019-11-23 RX ADMIN — Medication 1 DROP(S): at 16:55

## 2019-11-23 RX ADMIN — LOSARTAN POTASSIUM 25 MILLIGRAM(S): 100 TABLET, FILM COATED ORAL at 05:46

## 2019-11-23 RX ADMIN — INSULIN GLARGINE 5 UNIT(S): 100 INJECTION, SOLUTION SUBCUTANEOUS at 21:35

## 2019-11-23 RX ADMIN — TAMSULOSIN HYDROCHLORIDE 0.4 MILLIGRAM(S): 0.4 CAPSULE ORAL at 21:35

## 2019-11-23 RX ADMIN — Medication 1 TABLET(S): at 11:33

## 2019-11-23 NOTE — PROGRESS NOTE ADULT - SUBJECTIVE AND OBJECTIVE BOX
Patient is a 89y old  Female who presents with a chief complaint of Rectal bleeding (22 Nov 2019 10:01)      Subective:  Continues to have bleeding -- BRBPR x 3 today per nurse  However, H/H remains essentially stable  No abdominal pain    PAST MEDICAL & SURGICAL HISTORY:  Shoulder fracture, right  DM (diabetes mellitus): type I  Glaucoma  GERD (gastroesophageal reflux disease)  Breast cancer: right  Hearing loss: b/l  Atherosclerosis  DVT (deep venous thrombosis)  HLD (hyperlipidemia)  HTN (hypertension)  History of tonsillectomy  H/O unilateral oophorectomy: left ovary  Elective surgery: left hip surgery from fracture  S/P betzy  Breast cancer: right massectomy      MEDICATIONS  (STANDING):  atorvastatin 10 milliGRAM(s) Oral at bedtime  brimonidine 0.2% Ophthalmic Solution 1 Drop(s) Both EYES two times a day  dextrose 5%. 1000 milliLiter(s) (50 mL/Hr) IV Continuous <Continuous>  dextrose 50% Injectable 12.5 Gram(s) IV Push once  dextrose 50% Injectable 25 Gram(s) IV Push once  dextrose 50% Injectable 25 Gram(s) IV Push once  escitalopram 10 milliGRAM(s) Oral daily  insulin lispro (HumaLOG) corrective regimen sliding scale   SubCutaneous three times a day before meals  insulin lispro (HumaLOG) corrective regimen sliding scale   SubCutaneous at bedtime  latanoprost 0.005% Ophthalmic Solution 1 Drop(s) Both EYES at bedtime  losartan 25 milliGRAM(s) Oral daily  multivitamin 1 Tablet(s) Oral daily  tamsulosin 0.4 milliGRAM(s) Oral at bedtime  timolol 0.25% Solution 1 Drop(s) Both EYES two times a day    MEDICATIONS  (PRN):  acetaminophen   Tablet .. 650 milliGRAM(s) Oral every 4 hours PRN Temp greater or equal to 38C (100.4F), Mild Pain (1 - 3)  calcium carbonate    500 mG (Tums) Chewable 3 Tablet(s) Chew every 6 hours PRN Dyspepsia  dextrose 40% Gel 15 Gram(s) Oral once PRN Blood Glucose LESS THAN 70 milliGRAM(s)/deciliter  glucagon  Injectable 1 milliGRAM(s) IntraMuscular once PRN Glucose LESS THAN 70 milligrams/deciliter  loperamide 2 milliGRAM(s) Oral every 6 hours PRN Diarrhea  ondansetron Injectable 4 milliGRAM(s) IV Push every 6 hours PRN Nausea  traMADol 50 milliGRAM(s) Oral every 6 hours PRN Moderate Pain (4 - 6)      REVIEW OF SYSTEMS:    RESPIRATORY: No shortness of breath  CARDIOVASCULAR: No chest pain  All other review of systems is negative unless indicated above.    Vital Signs Last 24 Hrs  T(C): 36.6 (23 Nov 2019 11:43), Max: 36.6 (22 Nov 2019 21:24)  T(F): 97.9 (23 Nov 2019 11:43), Max: 97.9 (23 Nov 2019 05:42)  HR: 57 (23 Nov 2019 11:43) (52 - 63)  BP: 100/67 (23 Nov 2019 11:43) (100/67 - 153/44)  BP(mean): --  RR: 16 (23 Nov 2019 11:43) (16 - 18)  SpO2: 97% (23 Nov 2019 11:43) (95% - 97%)    PHYSICAL EXAM:    Constitutional: NAD, well-developed  Respiratory: CTAB  Cardiovascular: S1 and S2  Gastrointestinal: BS+, soft, NT/ND  Extremities: No peripheral edema  Psychiatric: Normal mood, normal affect    LABS:                        10.0   5.66  )-----------( 151      ( 23 Nov 2019 07:26 )             31.0     11-23    143  |  110<H>  |  19  ----------------------------<  126<H>  4.1   |  26  |  0.99    Ca    8.5      23 Nov 2019 07:26    TPro  6.8  /  Alb  3.1<L>  /  TBili  0.2  /  DBili  x   /  AST  10<L>  /  ALT  11<L>  /  AlkPhos  85  11-21    PT/INR - ( 21 Nov 2019 23:25 )   PT: 11.5 sec;   INR: 1.03 ratio         PTT - ( 21 Nov 2019 23:25 )  PTT:29.9 sec  LIVER FUNCTIONS - ( 21 Nov 2019 23:25 )  Alb: 3.1 g/dL / Pro: 6.8 gm/dL / ALK PHOS: 85 U/L / ALT: 11 U/L / AST: 10 U/L / GGT: x             RADIOLOGY & ADDITIONAL STUDIES:

## 2019-11-23 NOTE — PROGRESS NOTE ADULT - SUBJECTIVE AND OBJECTIVE BOX
SUBJECTIVE:    CHIEF COMPLAINT:  Patient is a 89y old  Female who presents with a chief complaint of Rectal bleeding (2019 14:36)      HPI:  89 yo female with h/o breast cancer, DM, DVT, HLD, HTN c/o rectal bleeding.  Pt noted blood to undergarments. Not sure where earlier on Thursday.  Went to see  Dr Irvin and UA was done in the office. Showed small blood and sent for a culture.  Patient went home and had two episodes of bleeding, this time appeared to be from rectum.  Seems to her that the bleeding only happens when she sits to urinate on the toilet.  Has some mild suprapubic pain and chronic low back pain.  No fever.  No n/v.  Hasn't had a colonoscopy in many many years, was seen by Dr Robin PAULINO.      Active Problems  Arthritis (716.90) (M19.90)  Atherosclerosis of native coronary artery (414.01) (I25.10)  Bilateral hearing loss (389.9) (H91.93)  Breast cancer (174.9) (C50.919)  Cataract, bilateral (366.9) (H26.9)  Constipation, chronic (564.00) (K59.09)  Depression with anxiety (300.4) (F41.8)  GERD (gastroesophageal reflux disease) (530.81) (K21.9)  Glaucoma (365.9) (H40.9)  Hip fracture (820.8) (S72.009A)  HTN (hypertension) (401.9) (I10)  Hyperlipidemia (272.4) (E78.5)  Influenza A (487.1) (J10.1)  Need for prophylactic vaccination and inoculation against influenza (V04.81) (Z23)  PPD positive (795.51) (R76.11)  Preop cardiovascular exam (V72.81) (Z01.810)  Pulmonary nodule (793.11) (R91.1)  Type 1 diabetes (250.01) (E10.9)  URI, acute (465.9) (J06.9)  UTI symptoms (788.99) (R39.9)  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)    Past Medical History  History of deep vein thrombosis (DVT) of lower extremity (V12.51) (Z86.718)  No pertinent past medical history    Surgical History  History of Appendectomy  History of Breast Surgery  · -Mastectomy  History of Hip Surgery  · -Open Reduction Internal Fixation (ORIF)  -Left Hip Fracture  History of Oophorectomy For Ectopic Pregnancy Left Ovary  History of Tonsillectomy    Family History  Family history of breast cancer (V16.3) (Z80.3) : Sister  Family history of leukemia (V16.6) (Z80.6) : Mother  Family history of malignant neoplasm of larynx (V16.2) (Z80.2) : Brother  FHx: prostate cancer (V16.42) (Z80.42) : Father, Brother    Social History  Former smoker (V15.82) (Z87.891)  Has 2 children  No illicit drug use  Nursing home resident (V60.6) (Z59.3)  · [facility]  Occasional alcohol use   (V61.07) (Z63.4)    Out Patient  Meds  Artificial Tears PF 0.1-0.3 % Ophthalmic Solution; INSTILL 1 DROP 2 times daily each  eye as needed for dry eyes  Aspir-Low 81 MG Oral Tablet Delayed Release; TAKE 1 TABLET BY MOUTH EVERY DAY  Atorvastatin Calcium 10 MG Oral Tablet; TAKE 1 TABLET DAILY AT BEDTIME  Mellissa Contour Next Test STRP; TEST TWICE DAILY  Colace 100 MG Oral Capsule; TAKE 1 CAPSULE 3 TIMES DAILY  Combigan 0.2-0.5 % Ophthalmic Solution; INSTILL 1 DROP, TWICE DAILY  Contour Test In Vitro Strip; USE 1 STRIP 3 TIMES DAILY  Daily Vitamin Oral Tablet; TAKE 1 TABLET DAILY  Durezol 0.05 % Ophthalmic Emulsion  EQ Acid Reducer 150 MG Oral Tablet; Take 1 tablet twice daily  Escitalopram Oxalate 10 MG Oral Tablet; TAKE 1 TABLET BY MOUTH every DAY  Glucagon Emergency 1 MG Injection Kit; USE AS DIRECTED  HumaLOG KwikPen 100 UNIT/ML Subcutaneous Solution Pen-injector; INJECT  SUBCUTANEOUSLY AS DIRECTED: 3 TIMES DAILY, AS PER SLIDING  SCALE  ICaps Areds 2 CAPS; TAKE AS DIRECTED: TAKE 1 CAPSULE TWICE DAILY  Losartan Potassium 25 MG Oral Tablet; Take 1 tablet daily  Lumigan 0.01 % Ophthalmic Solution; INSTILL 1 DROP, DAILY  Methenamine Hippurate 1 GM Oral Tablet; TAKE 1 TABLET TWICE DAILY  Nizatidine 150 MG Oral Capsule; TAKE 1 CAPSULE AT BEDTIME NIGHTLY  Pen Needles 32G X 4 MM; inject ttreee times daily  PenTips 32G X 4 MM; 'INJECT TTREEE TIMES DAILY  Preferred Plus Unifine Pentips 31G X 6 MM  raNITIdine HCl - 150 MG Oral Tablet; Take 1 tablet every 12 hours  Senna 8.6 MG Oral Capsule; TAKE AS DIRECTED: TAKE 2 CAPSULES AT BEDTIME  Tamsulosin HCl - 0.4 MG Oral Capsule; TAKE 1 CAPSULE Daily  TheraCran  MG Oral Capsule; TAKE 1 CAPSULE TWICE DAILY  Timolol Maleate 0.5 % Ophthalmic Solution; INSTILL 1 DROP INTO BOTH EYES 2 TIMES  DAILY  Toujeo SoloStar 300 UNIT/ML Subcutaneous Solution Pen-injector; 'INJECT 40 UNIT  DAILY AS DIRECTED  traMADol HCl - 50 MG Oral Tablet; TAKE 1 TABLET Twice daily MDD:2 tab  Tylenol Extra Strength 500 MG Oral Tablet; Take 1 tablet at noon daily    Allergies  No Known Drug Allergies (2019 10:01)      Interval HPI and Overnight Events: BRBPR x 3 today. H/H stable. Seen by GI. Requests DNR    REVIEW OF SYSTEMS:  CONSTITUTIONAL: No weakness, fevers or chills  EYES/ENT: No visual changes;  No vertigo or throat pain   NECK: No pain or stiffness  RESPIRATORY: No cough, wheezing, hemoptysis; No shortness of breath  CARDIOVASCULAR: No chest pain or palpitations  GASTROINTESTINAL: No abdominal or epigastric pain. No nausea, vomiting, or hematemesis; No diarrhea or constipation. No melena or hematochezia.  GENITOURINARY: No dysuria, frequency or hematuria  NEUROLOGICAL: No numbness or weakness  SKIN: No itching, burning, rashes, or lesions   All other review of systems is negative unless indicated above    OBJECTIVE    Vital Signs Last 24 Hrs  T(C): 36.6 (2019 11:43), Max: 36.6 (2019 21:24)  T(F): 97.9 (2019 11:43), Max: 97.9 (2019 05:42)  HR: 57 (2019 11:43) (52 - 63)  BP: 100/67 (2019 11:43) (100/67 - 153/44)  BP(mean): --  RR: 16 (2019 11:43) (16 - 18)  SpO2: 97% (2019 11:43) (95% - 97%)    MEDICATIONS  (STANDING):  atorvastatin 10 milliGRAM(s) Oral at bedtime  brimonidine 0.2% Ophthalmic Solution 1 Drop(s) Both EYES two times a day  dextrose 5%. 1000 milliLiter(s) (50 mL/Hr) IV Continuous <Continuous>  dextrose 50% Injectable 12.5 Gram(s) IV Push once  dextrose 50% Injectable 25 Gram(s) IV Push once  dextrose 50% Injectable 25 Gram(s) IV Push once  escitalopram 10 milliGRAM(s) Oral daily  insulin glargine Injectable (LANTUS) 5 Unit(s) SubCutaneous at bedtime  insulin lispro (HumaLOG) corrective regimen sliding scale   SubCutaneous three times a day before meals  insulin lispro (HumaLOG) corrective regimen sliding scale   SubCutaneous at bedtime  latanoprost 0.005% Ophthalmic Solution 1 Drop(s) Both EYES at bedtime  losartan 25 milliGRAM(s) Oral daily  multivitamin 1 Tablet(s) Oral daily  tamsulosin 0.4 milliGRAM(s) Oral at bedtime  timolol 0.25% Solution 1 Drop(s) Both EYES two times a day      LABS:                         10.0   5.66  )-----------( 151      ( 2019 07:26 )             31.0     11-23    143  |  110<H>  |  19  ----------------------------<  126<H>  4.1   |  26  |  0.99    Ca    8.5      2019 07:26    TPro  6.8  /  Alb  3.1<L>  /  TBili  0.2  /  DBili  x   /  AST  10<L>  /  ALT  11<L>  /  AlkPhos  85  11-21    Urinalysis Basic - ( 2019 00:13 )    Color: Yellow / Appearance: Clear / S.010 / pH: x  Gluc: x / Ketone: Negative  / Bili: Negative / Urobili: Negative mg/dL   Blood: x / Protein: Negative mg/dL / Nitrite: Negative   Leuk Esterase: Negative / RBC: 0-2 /HPF / WBC 0-2   Sq Epi: x / Non Sq Epi: Moderate / Bacteria: Occasional    PT/INR - ( 2019 23:25 )   PT: 11.5 sec;   INR: 1.03 ratio    PTT - ( 2019 23:25 )  PTT:29.9 sec    Specimen Source .Urine Catheterized    @ 00:13  Culture Results  No growth    CAPILLARY BLOOD GLUCOSE  POCT Blood Glucose.: 127 mg/dL (2019 16:47)  POCT Blood Glucose.: 173 mg/dL (2019 11:32)  POCT Blood Glucose.: 132 mg/dL (2019 07:30)  POCT Blood Glucose.: 257 mg/dL (2019 21:24)  POCT Blood Glucose.: 224 mg/dL (2019 17:06)

## 2019-11-24 ENCOUNTER — RX RENEWAL (OUTPATIENT)
Age: 84
End: 2019-11-24

## 2019-11-24 LAB
BACTERIA UR CULT: ABNORMAL
HCT VFR BLD CALC: 31.4 % — LOW (ref 34.5–45)
HGB BLD-MCNC: 10.4 G/DL — LOW (ref 11.5–15.5)
MCHC RBC-ENTMCNC: 31.6 PG — SIGNIFICANT CHANGE UP (ref 27–34)
MCHC RBC-ENTMCNC: 33.1 GM/DL — SIGNIFICANT CHANGE UP (ref 32–36)
MCV RBC AUTO: 95.4 FL — SIGNIFICANT CHANGE UP (ref 80–100)
PLATELET # BLD AUTO: 157 K/UL — SIGNIFICANT CHANGE UP (ref 150–400)
RBC # BLD: 3.29 M/UL — LOW (ref 3.8–5.2)
RBC # FLD: 12.3 % — SIGNIFICANT CHANGE UP (ref 10.3–14.5)
WBC # BLD: 4.72 K/UL — SIGNIFICANT CHANGE UP (ref 3.8–10.5)
WBC # FLD AUTO: 4.72 K/UL — SIGNIFICANT CHANGE UP (ref 3.8–10.5)

## 2019-11-24 PROCEDURE — 99232 SBSQ HOSP IP/OBS MODERATE 35: CPT

## 2019-11-24 RX ORDER — HYDROCORTISONE 1 %
1 OINTMENT (GRAM) TOPICAL DAILY
Refills: 0 | Status: DISCONTINUED | OUTPATIENT
Start: 2019-11-24 | End: 2019-11-25

## 2019-11-24 RX ADMIN — Medication 8: at 11:59

## 2019-11-24 RX ADMIN — INSULIN GLARGINE 5 UNIT(S): 100 INJECTION, SOLUTION SUBCUTANEOUS at 21:00

## 2019-11-24 RX ADMIN — BRIMONIDINE TARTRATE 1 DROP(S): 2 SOLUTION/ DROPS OPHTHALMIC at 06:07

## 2019-11-24 RX ADMIN — ESCITALOPRAM OXALATE 10 MILLIGRAM(S): 10 TABLET, FILM COATED ORAL at 11:37

## 2019-11-24 RX ADMIN — ATORVASTATIN CALCIUM 10 MILLIGRAM(S): 80 TABLET, FILM COATED ORAL at 21:00

## 2019-11-24 RX ADMIN — LATANOPROST 1 DROP(S): 0.05 SOLUTION/ DROPS OPHTHALMIC; TOPICAL at 21:01

## 2019-11-24 RX ADMIN — Medication 1 SUPPOSITORY(S): at 18:36

## 2019-11-24 RX ADMIN — TAMSULOSIN HYDROCHLORIDE 0.4 MILLIGRAM(S): 0.4 CAPSULE ORAL at 21:00

## 2019-11-24 RX ADMIN — Medication 4: at 17:37

## 2019-11-24 RX ADMIN — Medication 1 DROP(S): at 18:36

## 2019-11-24 RX ADMIN — PANTOPRAZOLE SODIUM 40 MILLIGRAM(S): 20 TABLET, DELAYED RELEASE ORAL at 06:07

## 2019-11-24 RX ADMIN — Medication 1 DROP(S): at 06:07

## 2019-11-24 RX ADMIN — Medication 1 TABLET(S): at 11:37

## 2019-11-24 RX ADMIN — LOSARTAN POTASSIUM 25 MILLIGRAM(S): 100 TABLET, FILM COATED ORAL at 06:07

## 2019-11-24 RX ADMIN — BRIMONIDINE TARTRATE 1 DROP(S): 2 SOLUTION/ DROPS OPHTHALMIC at 17:38

## 2019-11-24 NOTE — PROGRESS NOTE ADULT - SUBJECTIVE AND OBJECTIVE BOX
Patient is a 89y old  Female who presents with a chief complaint of Rectal bleeding (23 Nov 2019 16:55)      HPI:  she had some brbpr earlier this am  then normal loose brown stool without bleeding  no abdominal pain  h/h have been stable    PAST MEDICAL & SURGICAL HISTORY:  Shoulder fracture, right  DM (diabetes mellitus): type I  Glaucoma  GERD (gastroesophageal reflux disease)  Breast cancer: right  Hearing loss: b/l  Atherosclerosis  DVT (deep venous thrombosis)  HLD (hyperlipidemia)  HTN (hypertension)  History of tonsillectomy  H/O unilateral oophorectomy: left ovary  Elective surgery: left hip surgery from fracture  S/P betzy  Breast cancer: right massectomy      MEDICATIONS  (STANDING):  atorvastatin 10 milliGRAM(s) Oral at bedtime  brimonidine 0.2% Ophthalmic Solution 1 Drop(s) Both EYES two times a day  dextrose 5%. 1000 milliLiter(s) (50 mL/Hr) IV Continuous <Continuous>  dextrose 50% Injectable 12.5 Gram(s) IV Push once  dextrose 50% Injectable 25 Gram(s) IV Push once  dextrose 50% Injectable 25 Gram(s) IV Push once  escitalopram 10 milliGRAM(s) Oral daily  hydrocortisone hemorrhoidal Suppository 1 Suppository(s) Rectal daily  insulin glargine Injectable (LANTUS) 5 Unit(s) SubCutaneous at bedtime  insulin lispro (HumaLOG) corrective regimen sliding scale   SubCutaneous three times a day before meals  insulin lispro (HumaLOG) corrective regimen sliding scale   SubCutaneous at bedtime  latanoprost 0.005% Ophthalmic Solution 1 Drop(s) Both EYES at bedtime  losartan 25 milliGRAM(s) Oral daily  multivitamin 1 Tablet(s) Oral daily  pantoprazole    Tablet 40 milliGRAM(s) Oral before breakfast  tamsulosin 0.4 milliGRAM(s) Oral at bedtime  timolol 0.25% Solution 1 Drop(s) Both EYES two times a day    MEDICATIONS  (PRN):  acetaminophen   Tablet .. 650 milliGRAM(s) Oral every 4 hours PRN Temp greater or equal to 38C (100.4F), Mild Pain (1 - 3)  calcium carbonate    500 mG (Tums) Chewable 3 Tablet(s) Chew every 6 hours PRN Dyspepsia  dextrose 40% Gel 15 Gram(s) Oral once PRN Blood Glucose LESS THAN 70 milliGRAM(s)/deciliter  glucagon  Injectable 1 milliGRAM(s) IntraMuscular once PRN Glucose LESS THAN 70 milligrams/deciliter  loperamide 2 milliGRAM(s) Oral every 6 hours PRN Diarrhea  ondansetron Injectable 4 milliGRAM(s) IV Push every 6 hours PRN Nausea  traMADol 50 milliGRAM(s) Oral every 6 hours PRN Moderate Pain (4 - 6)    Allergies  epinephrine (Anaphylaxis;)  Originally Entered as [Unknown] reaction to [any anestethics other than lido] (Unknown)    REVIEW OF SYSTEMS:    CONSTITUTIONAL: No weakness, fevers or chills  RESPIRATORY: No cough, wheezing, hemoptysis; No shortness of breath  CARDIOVASCULAR: No chest pain or palpitations  GASTROINTESTINAL: as above  All other review of systems is negative unless indicated above.    Vital Signs Last 24 Hrs  T(C): 36.7 (24 Nov 2019 05:35), Max: 36.8 (23 Nov 2019 21:49)  T(F): 98 (24 Nov 2019 05:35), Max: 98.2 (23 Nov 2019 21:49)  HR: 68 (24 Nov 2019 05:35) (57 - 68)  BP: 150/40 (24 Nov 2019 05:35) (100/67 - 150/40)  BP(mean): --  RR: 16 (24 Nov 2019 05:35) (16 - 16)  SpO2: 93% (24 Nov 2019 05:35) (93% - 97%)    PHYSICAL EXAM:  Constitutional: elderly female nad  Respiratory: CTA  Cardiovascular: S1 and S2  Gastrointestinal: BS+, soft, NT/ND      LABS:                        10.4   4.72  )-----------( 157      ( 24 Nov 2019 07:06 )             31.4     11-23    143  |  110<H>  |  19  ----------------------------<  126<H>  4.1   |  26  |  0.99    Ca    8.5      23 Nov 2019 07:26            RADIOLOGY & ADDITIONAL STUDIES:

## 2019-11-24 NOTE — PROVIDER CONTACT NOTE (OTHER) - SITUATION
Spoke to Judy answering service.  is aware of consult.
Left message with service to return Dr. Machado's call.

## 2019-11-24 NOTE — PROGRESS NOTE ADULT - SUBJECTIVE AND OBJECTIVE BOX
SUBJECTIVE:    CHIEF COMPLAINT:  Patient is a 89y old  Female who presents with a chief complaint of Rectal bleeding (24 Nov 2019 10:36)      HPI:  89 yo female with h/o breast cancer, DM, DVT, HLD, HTN c/o rectal bleeding.  Pt noted blood to undergarments. Not sure where earlier on Thursday.  Went to see  Dr Irvin and UA was done in the office. Showed small blood and sent for a culture.  Patient went home and had two episodes of bleeding, this time appeared to be from rectum.  Seems to her that the bleeding only happens when she sits to urinate on the toilet.  Has some mild suprapubic pain and chronic low back pain.  No fever.  No n/v.  Hasn't had a colonoscopy in many many years, was seen by Dr Robin PAULINO.      Active Problems  Arthritis (716.90) (M19.90)  Atherosclerosis of native coronary artery (414.01) (I25.10)  Bilateral hearing loss (389.9) (H91.93)  Breast cancer (174.9) (C50.919)  Cataract, bilateral (366.9) (H26.9)  Constipation, chronic (564.00) (K59.09)  Depression with anxiety (300.4) (F41.8)  GERD (gastroesophageal reflux disease) (530.81) (K21.9)  Glaucoma (365.9) (H40.9)  Hip fracture (820.8) (S72.009A)  HTN (hypertension) (401.9) (I10)  Hyperlipidemia (272.4) (E78.5)  Influenza A (487.1) (J10.1)  Need for prophylactic vaccination and inoculation against influenza (V04.81) (Z23)  PPD positive (795.51) (R76.11)  Preop cardiovascular exam (V72.81) (Z01.810)  Pulmonary nodule (793.11) (R91.1)  Type 1 diabetes (250.01) (E10.9)  URI, acute (465.9) (J06.9)  UTI symptoms (788.99) (R39.9)  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)    Past Medical History  History of deep vein thrombosis (DVT) of lower extremity (V12.51) (Z86.718)  No pertinent past medical history    Surgical History  History of Appendectomy  History of Breast Surgery  · -Mastectomy  History of Hip Surgery  · -Open Reduction Internal Fixation (ORIF)  -Left Hip Fracture  History of Oophorectomy For Ectopic Pregnancy Left Ovary  History of Tonsillectomy    Family History  Family history of breast cancer (V16.3) (Z80.3) : Sister  Family history of leukemia (V16.6) (Z80.6) : Mother  Family history of malignant neoplasm of larynx (V16.2) (Z80.2) : Brother  FHx: prostate cancer (V16.42) (Z80.42) : Father, Brother    Social History  Former smoker (V15.82) (Z87.891)  Has 2 children  No illicit drug use  Nursing home resident (V60.6) (Z59.3)  · [facility]  Occasional alcohol use   (V61.07) (Z63.4)    Out Patient  Meds  Artificial Tears PF 0.1-0.3 % Ophthalmic Solution; INSTILL 1 DROP 2 times daily each  eye as needed for dry eyes  Aspir-Low 81 MG Oral Tablet Delayed Release; TAKE 1 TABLET BY MOUTH EVERY DAY  Atorvastatin Calcium 10 MG Oral Tablet; TAKE 1 TABLET DAILY AT BEDTIME  Mellissa Contour Next Test STRP; TEST TWICE DAILY  Colace 100 MG Oral Capsule; TAKE 1 CAPSULE 3 TIMES DAILY  Combigan 0.2-0.5 % Ophthalmic Solution; INSTILL 1 DROP, TWICE DAILY  Contour Test In Vitro Strip; USE 1 STRIP 3 TIMES DAILY  Daily Vitamin Oral Tablet; TAKE 1 TABLET DAILY  Durezol 0.05 % Ophthalmic Emulsion  EQ Acid Reducer 150 MG Oral Tablet; Take 1 tablet twice daily  Escitalopram Oxalate 10 MG Oral Tablet; TAKE 1 TABLET BY MOUTH every DAY  Glucagon Emergency 1 MG Injection Kit; USE AS DIRECTED  HumaLOG KwikPen 100 UNIT/ML Subcutaneous Solution Pen-injector; INJECT  SUBCUTANEOUSLY AS DIRECTED: 3 TIMES DAILY, AS PER SLIDING  SCALE  ICaps Areds 2 CAPS; TAKE AS DIRECTED: TAKE 1 CAPSULE TWICE DAILY  Losartan Potassium 25 MG Oral Tablet; Take 1 tablet daily  Lumigan 0.01 % Ophthalmic Solution; INSTILL 1 DROP, DAILY  Methenamine Hippurate 1 GM Oral Tablet; TAKE 1 TABLET TWICE DAILY  Nizatidine 150 MG Oral Capsule; TAKE 1 CAPSULE AT BEDTIME NIGHTLY  Pen Needles 32G X 4 MM; inject ttreee times daily  PenTips 32G X 4 MM; 'INJECT TTREEE TIMES DAILY  Preferred Plus Unifine Pentips 31G X 6 MM  raNITIdine HCl - 150 MG Oral Tablet; Take 1 tablet every 12 hours  Senna 8.6 MG Oral Capsule; TAKE AS DIRECTED: TAKE 2 CAPSULES AT BEDTIME  Tamsulosin HCl - 0.4 MG Oral Capsule; TAKE 1 CAPSULE Daily  TheraCran  MG Oral Capsule; TAKE 1 CAPSULE TWICE DAILY  Timolol Maleate 0.5 % Ophthalmic Solution; INSTILL 1 DROP INTO BOTH EYES 2 TIMES  DAILY  Toujeo SoloStar 300 UNIT/ML Subcutaneous Solution Pen-injector; 'INJECT 40 UNIT  DAILY AS DIRECTED  traMADol HCl - 50 MG Oral Tablet; TAKE 1 TABLET Twice daily MDD:2 tab  Tylenol Extra Strength 500 MG Oral Tablet; Take 1 tablet at noon daily    Allergies  No Known Drug Allergies (22 Nov 2019 10:01)      Interval HPI and Overnight Events: Pt continues to have episodes of rectal bleeding despite stable H/H    REVIEW OF SYSTEMS:  CONSTITUTIONAL: No weakness, fevers or chills  EYES/ENT: No visual changes;  No vertigo or throat pain   NECK: No pain or stiffness  RESPIRATORY: No cough, wheezing, hemoptysis; No shortness of breath  CARDIOVASCULAR: No chest pain or palpitations  GASTROINTESTINAL: No abdominal or epigastric pain. No nausea, vomiting, or hematemesis; No diarrhea or constipation. No melena or hematochezia.  GENITOURINARY: No dysuria, frequency or hematuria  NEUROLOGICAL: No numbness or weakness  SKIN: No itching, burning, rashes, or lesions   All other review of systems is negative unless indicated above    OBJECTIVE    Vital Signs Last 24 Hrs  T(C): 37.1 (24 Nov 2019 11:09), Max: 37.1 (24 Nov 2019 11:09)  T(F): 98.8 (24 Nov 2019 11:09), Max: 98.8 (24 Nov 2019 11:09)  HR: 65 (24 Nov 2019 11:09) (60 - 68)  BP: 124/37 (24 Nov 2019 11:09) (124/37 - 150/40)  BP(mean): --  RR: 18 (24 Nov 2019 11:09) (16 - 18)  SpO2: 96% (24 Nov 2019 11:09) (93% - 96%)    MEDICATIONS  (STANDING):  atorvastatin 10 milliGRAM(s) Oral at bedtime  brimonidine 0.2% Ophthalmic Solution 1 Drop(s) Both EYES two times a day  dextrose 50% Injectable 12.5 Gram(s) IV Push once  dextrose 50% Injectable 25 Gram(s) IV Push once  dextrose 50% Injectable 25 Gram(s) IV Push once  escitalopram 10 milliGRAM(s) Oral daily  hydrocortisone hemorrhoidal Suppository 1 Suppository(s) Rectal daily  insulin glargine Injectable (LANTUS) 5 Unit(s) SubCutaneous at bedtime  insulin lispro (HumaLOG) corrective regimen sliding scale   SubCutaneous three times a day before meals  insulin lispro (HumaLOG) corrective regimen sliding scale   SubCutaneous at bedtime  latanoprost 0.005% Ophthalmic Solution 1 Drop(s) Both EYES at bedtime  losartan 25 milliGRAM(s) Oral daily  multivitamin 1 Tablet(s) Oral daily  pantoprazole    Tablet 40 milliGRAM(s) Oral before breakfast  tamsulosin 0.4 milliGRAM(s) Oral at bedtime  timolol 0.25% Solution 1 Drop(s) Both EYES two times a day      LABS:                         10.4   4.72  )-----------( 157      ( 24 Nov 2019 07:06 )             31.4     11-23    143  |  110<H>  |  19  ----------------------------<  126<H>  4.1   |  26  |  0.99    Ca    8.5      23 Nov 2019 07:26              Specimen Source .Urine Catheterized   11-22 @ 00:13  Culture Results  No growth            CAPILLARY BLOOD GLUCOSE      POCT Blood Glucose.: 326 mg/dL (24 Nov 2019 11:56)  POCT Blood Glucose.: 150 mg/dL (24 Nov 2019 08:04)  POCT Blood Glucose.: 187 mg/dL (23 Nov 2019 21:28)  POCT Blood Glucose.: 127 mg/dL (23 Nov 2019 16:47)        RADIOLOGY/EKG:

## 2019-11-25 ENCOUNTER — CLINICAL ADVICE (OUTPATIENT)
Age: 84
End: 2019-11-25

## 2019-11-25 ENCOUNTER — TRANSCRIPTION ENCOUNTER (OUTPATIENT)
Age: 84
End: 2019-11-25

## 2019-11-25 VITALS
OXYGEN SATURATION: 95 % | SYSTOLIC BLOOD PRESSURE: 134 MMHG | TEMPERATURE: 98 F | DIASTOLIC BLOOD PRESSURE: 39 MMHG | RESPIRATION RATE: 16 BRPM | HEART RATE: 59 BPM

## 2019-11-25 LAB
HCT VFR BLD CALC: 31.2 % — LOW (ref 34.5–45)
HGB BLD-MCNC: 10.4 G/DL — LOW (ref 11.5–15.5)
MCHC RBC-ENTMCNC: 32.1 PG — SIGNIFICANT CHANGE UP (ref 27–34)
MCHC RBC-ENTMCNC: 33.3 GM/DL — SIGNIFICANT CHANGE UP (ref 32–36)
MCV RBC AUTO: 96.3 FL — SIGNIFICANT CHANGE UP (ref 80–100)
PLATELET # BLD AUTO: 147 K/UL — LOW (ref 150–400)
RBC # BLD: 3.24 M/UL — LOW (ref 3.8–5.2)
RBC # FLD: 12.5 % — SIGNIFICANT CHANGE UP (ref 10.3–14.5)
WBC # BLD: 5.91 K/UL — SIGNIFICANT CHANGE UP (ref 3.8–10.5)
WBC # FLD AUTO: 5.91 K/UL — SIGNIFICANT CHANGE UP (ref 3.8–10.5)

## 2019-11-25 PROCEDURE — 99232 SBSQ HOSP IP/OBS MODERATE 35: CPT

## 2019-11-25 PROCEDURE — 99231 SBSQ HOSP IP/OBS SF/LOW 25: CPT

## 2019-11-25 PROCEDURE — 99239 HOSP IP/OBS DSCHRG MGMT >30: CPT

## 2019-11-25 RX ORDER — INSULIN GLARGINE 100 [IU]/ML
12 INJECTION, SOLUTION SUBCUTANEOUS AT BEDTIME
Refills: 0 | Status: DISCONTINUED | OUTPATIENT
Start: 2019-11-25 | End: 2019-11-25

## 2019-11-25 RX ORDER — HYDROCORTISONE 1 %
1 OINTMENT (GRAM) TOPICAL
Qty: 10 | Refills: 0
Start: 2019-11-25 | End: 2019-12-04

## 2019-11-25 RX ORDER — POLYETHYLENE GLYCOL 3350 17 G/17G
17 POWDER, FOR SOLUTION ORAL
Refills: 0 | Status: DISCONTINUED | OUTPATIENT
Start: 2019-11-25 | End: 2019-11-25

## 2019-11-25 RX ADMIN — Medication 8: at 12:01

## 2019-11-25 RX ADMIN — Medication 1 SUPPOSITORY(S): at 12:00

## 2019-11-25 RX ADMIN — PANTOPRAZOLE SODIUM 40 MILLIGRAM(S): 20 TABLET, DELAYED RELEASE ORAL at 04:57

## 2019-11-25 RX ADMIN — ESCITALOPRAM OXALATE 10 MILLIGRAM(S): 10 TABLET, FILM COATED ORAL at 12:00

## 2019-11-25 RX ADMIN — BRIMONIDINE TARTRATE 1 DROP(S): 2 SOLUTION/ DROPS OPHTHALMIC at 04:58

## 2019-11-25 RX ADMIN — Medication 2: at 17:02

## 2019-11-25 RX ADMIN — BRIMONIDINE TARTRATE 1 DROP(S): 2 SOLUTION/ DROPS OPHTHALMIC at 17:04

## 2019-11-25 RX ADMIN — Medication 2: at 08:06

## 2019-11-25 RX ADMIN — LOSARTAN POTASSIUM 25 MILLIGRAM(S): 100 TABLET, FILM COATED ORAL at 04:57

## 2019-11-25 RX ADMIN — Medication 1 DROP(S): at 17:03

## 2019-11-25 RX ADMIN — Medication 1 DROP(S): at 04:56

## 2019-11-25 RX ADMIN — Medication 1 TABLET(S): at 12:00

## 2019-11-25 NOTE — DISCHARGE NOTE PROVIDER - PROVIDER TOKENS
PROVIDER:[TOKEN:[[id number]],FOLLOWUP:[1 week]],PROVIDER:[[id number]],FOLLOWUP:[1-3 days]]

## 2019-11-25 NOTE — PROGRESS NOTE ADULT - SUBJECTIVE AND OBJECTIVE BOX
Patient is a 89y old  Female who presents with a chief complaint of Rectal bleeding (24 Nov 2019 14:02)    HPI:  89 yo female with h/o breast cancer, DM, DVT, HLD, HTN c/o rectal bleeding.  Pt noted blood to undergarments. Not sure where earlier on Thursday.  Went to see  Dr Irvin and UA was done in the office. Showed small blood and sent for a culture.  Patient went home and had two episodes of bleeding, this time appeared to be from rectum.  Seems to her that the bleeding only happens when she sits to urinate on the toilet.  Has some mild suprapubic pain and chronic low back pain.  No fever.  No n/v.  Hasn't had a colonoscopy in many many years, was seen by Dr Robin PAULINO.    11/25/2019-  Pt feels fine, tolerated diet.   No overt gi bleeding overnight.   BRBPR yesterday.   H/H stable.  No abdominal pain.     PAST MEDICAL & SURGICAL HISTORY:  Shoulder fracture, right  DM (diabetes mellitus): type I  Glaucoma  GERD (gastroesophageal reflux disease)  Breast cancer: right  Hearing loss: b/l  Atherosclerosis  DVT (deep venous thrombosis)  HLD (hyperlipidemia)  HTN (hypertension)  History of tonsillectomy  H/O unilateral oophorectomy: left ovary  Elective surgery: left hip surgery from fracture  S/P betzy  Breast cancer: right massectomy    MEDICATIONS  (STANDING):  atorvastatin 10 milliGRAM(s) Oral at bedtime  brimonidine 0.2% Ophthalmic Solution 1 Drop(s) Both EYES two times a day  dextrose 50% Injectable 12.5 Gram(s) IV Push once  dextrose 50% Injectable 25 Gram(s) IV Push once  dextrose 50% Injectable 25 Gram(s) IV Push once  escitalopram 10 milliGRAM(s) Oral daily  hydrocortisone hemorrhoidal Suppository 1 Suppository(s) Rectal daily  insulin glargine Injectable (LANTUS) 5 Unit(s) SubCutaneous at bedtime  insulin lispro (HumaLOG) corrective regimen sliding scale   SubCutaneous three times a day before meals  insulin lispro (HumaLOG) corrective regimen sliding scale   SubCutaneous at bedtime  latanoprost 0.005% Ophthalmic Solution 1 Drop(s) Both EYES at bedtime  losartan 25 milliGRAM(s) Oral daily  multivitamin 1 Tablet(s) Oral daily  pantoprazole    Tablet 40 milliGRAM(s) Oral before breakfast  tamsulosin 0.4 milliGRAM(s) Oral at bedtime  timolol 0.25% Solution 1 Drop(s) Both EYES two times a day    MEDICATIONS  (PRN):  acetaminophen   Tablet .. 650 milliGRAM(s) Oral every 4 hours PRN Temp greater or equal to 38C (100.4F), Mild Pain (1 - 3)  calcium carbonate    500 mG (Tums) Chewable 3 Tablet(s) Chew every 6 hours PRN Dyspepsia  dextrose 40% Gel 15 Gram(s) Oral once PRN Blood Glucose LESS THAN 70 milliGRAM(s)/deciliter  glucagon  Injectable 1 milliGRAM(s) IntraMuscular once PRN Glucose LESS THAN 70 milligrams/deciliter  loperamide 2 milliGRAM(s) Oral every 6 hours PRN Diarrhea  ondansetron Injectable 4 milliGRAM(s) IV Push every 6 hours PRN Nausea  traMADol 50 milliGRAM(s) Oral every 6 hours PRN Moderate Pain (4 - 6)    Allergies  epinephrine (Anaphylaxis;)  Originally Entered as [Unknown] reaction to [any anestethics other than lido] (Unknown)    FAMILY HISTORY:  No pertinent family history in first degree relatives    REVIEW OF SYSTEMS:  CONSTITUTIONAL: No weakness, fevers or chills  RESPIRATORY: No cough, wheezing, hemoptysis; No shortness of breath  CARDIOVASCULAR: No chest pain or palpitations  GASTROINTESTINAL: Per HPI.     Vital Signs Last 24 Hrs  T(C): 36.8 (25 Nov 2019 04:52), Max: 37.1 (24 Nov 2019 11:09)  T(F): 98.3 (25 Nov 2019 04:52), Max: 98.8 (24 Nov 2019 11:09)  HR: 60 (25 Nov 2019 04:52) (55 - 65)  BP: 143/47 (25 Nov 2019 04:52) (109/44 - 143/47)  BP(mean): --  RR: 16 (25 Nov 2019 04:52) (16 - 18)  SpO2: 96% (25 Nov 2019 04:52) (96% - 100%)    PHYSICAL EXAM:  Constitutional: Elderly female in NAD.   Respiratory: CTAB  Cardiovascular: S1 and S2, RRR, no M/G/R  Gastrointestinal: BS+, soft, NT/ND    LABS:                        10.4   5.91  )-----------( 147      ( 25 Nov 2019 08:04 )             31.2                 RADIOLOGY & ADDITIONAL STUDIES:

## 2019-11-25 NOTE — PHYSICAL THERAPY INITIAL EVALUATION ADULT - MANUAL MUSCLE TESTING RESULTS, REHAB EVAL
Bilateral UE grossly 3/5, right shoulder slightly reactive with reports of hx of fractured clavicle on right side, bilateral LE grossly 3+/5 to WFL/WNL/grossly assessed due to

## 2019-11-25 NOTE — PHYSICAL THERAPY INITIAL EVALUATION ADULT - DISCHARGE DISPOSITION, PT EVAL
home w/ home PT/The pt acknowledges having a history of falls which come about unannounced, the pt cannot describe her symptoms but reports that when she feels them, she has fallen in the past. The pt did not present with any c/o during ambulation assessment today./home

## 2019-11-25 NOTE — DISCHARGE NOTE NURSING/CASE MANAGEMENT/SOCIAL WORK - PATIENT PORTAL LINK FT
You can access the FollowMyHealth Patient Portal offered by [facility] by registering at the following website: [email or web address]. By joining [employer]’s FollowMyHealth portal, you will also be able to view your health information using other applications (apps) compatible with our system.

## 2019-11-25 NOTE — CHART NOTE - NSCHARTNOTEFT_GEN_A_CORE
Pt seen and examined  d/w NP Pam Connolly in detail  Please see her note for details of h and p  Briefly, 89yo female with intermittent rectal bleeding  she can have brb mixed with stool and normal brown bm without bleeding  abd soft nontender  given stability of hgb, likely hemorrhoidal in nature  empiric treatment of hemorrhoids  seen by colorectal surgery  d/c planning

## 2019-11-25 NOTE — DISCHARGE NOTE PROVIDER - NSDCMRMEDTOKEN_GEN_ALL_CORE_FT
acetaminophen 500 mg oral tablet: 1 tab(s) orally once a day, As Needed  atorvastatin 10 mg oral tablet: 1 tab(s) orally once a day (at bedtime)  Combigan 0.2%-0.5% ophthalmic solution: 1 drop(s) in each eye every 12 hours  docusate sodium 100 mg oral capsule: 1 cap(s) orally 3 times a day  escitalopram 10 mg oral tablet: 1 tab(s) orally once a day  Hiprex 1 g oral tablet: 1 tab(s) orally 2 times a day  hydrocortisone 25 mg rectal suppository: 1 suppository(ies) rectal once a day  losartan 25 mg oral tablet: 1 tab(s) orally once a day  Lumigan 0.03% ophthalmic solution: 1 drop(s) to each affected eye once a day (in the evening)  nizatidine 150 mg oral capsule: 1 cap(s) orally once a day (at bedtime)  NovoLOG FlexPen 100 units/mL injectable solution: 1 dose(s) injectable 3 times a day  ****Sliding Scale***  PreserVision AREDS 2 oral capsule: 1 cap(s) orally 2 times a day  senna oral tablet: 2 tab(s) orally once a day (at bedtime)  tamsulosin 0.4 mg oral capsule: 1 cap(s) orally once a day  TheraCran HP oral capsule: 1 cap(s) orally 2 times a day  timolol maleate 0.25% ophthalmic solution: 1 drop(s) to each affected eye 2 times a day  traMADol 50 mg oral tablet: 1 tab(s) orally 2 times a day - 10)

## 2019-11-25 NOTE — PHYSICAL THERAPY INITIAL EVALUATION ADULT - GENERAL OBSERVATIONS, REHAB EVAL
The pt was pleasant and cooperative and eager to ambulate with PT, the pt was received on 5S, sitting OOB and in a chair, in NAD. The pt expressed her concerns about her hx of falls.

## 2019-11-25 NOTE — DISCHARGE NOTE PROVIDER - HOSPITAL COURSE
Pt admitted for multiple episodes of rectal bleeding. H/H monitored  over stay. H/H did not drop despite persistant episodic bleeding. Seen by GI. Declined to do colonoscopy. Felt that bleeding secondary to  internal hemmorhoids.  Seen by colorectal surgery. Advised  of need for out patient treatment of internal hemmorhoids. Cleared for discharge to assisted living. May continue to have some bleeding but not concerning due to stability. Arange for follow up with colorectal surgery ASAP. Discussed with the patients son. Will arrange for transportation.

## 2019-11-25 NOTE — DISCHARGE NOTE PROVIDER - CARE PROVIDER_API CALL
Alonso Irvin)  Family Medicine  [address], [address] [address]  Phone: [phone]  Fax: [phone]  Follow Up Time: 1 week    Symone Valente)  ColonRectal Surgery; Surgery  [address] [place] [address]  Phone: [phone]  Fax: [phone]  Follow Up Time: 1-3 days

## 2019-11-25 NOTE — PROGRESS NOTE ADULT - ASSESSMENT
89 y/o female admitted with BRBPR, with a normal CTA, noting diverticulosis.   Likely internal hemorrhoids due to recent episode of constipation, less likely diverticular bleeding as h/h continues to remain stable.   Continue supp. for presumed internal hemorrhoids.   Awaiting colorectal consult.   No plans for colonoscopy at this time.   Discussed with pt, Dr. Escamilla, nurse.
89yo female with rectal bleeding  likely hemorrhoidal given no pain, stable h/h and intermittent  po as tolerated  no plans for colonoscopy at this time  I added suppository for empiric treatment of presumed internal hemorrhoids
Imp:  Persistent BRBPR    Rec:  Continue supportive care  Advance diet  Follow H/H
89 year old  female with BPBPR    Assessment:  Acute LGIB  Constipation  Depression  GERD  Less likely diverticular bleed per GI, probably hemmorhoids  Arthritis  HTN  Hyperlipidemia    Plan:  GI following. Do not feel colonoscopy in order, yet Pt. continues to have episodes of rectal bleeding despite stable H/H  Will call colorectal surgery for evaluation. If persistently bleeding hemorrhoid my concern is that after discharge, if patient continue to bleed, Assisted living will send her right back to the hospital. Perhaps banding will be of help  Tolerating low fiber  Insulin coverage  Low dose Lantus  CBC in am  PO Protonix    DVT Prophylaxis:  No DVT prophylaxis due to active bleeding    Advanced Directives:  DNR/DNI
89 year old  female with BPBPR    Assessment:  Acute LGIB  Constipation  Depression  GERD  Possible diverticular bleed  Arthritis  HTN  Hyperlipidemia    Plan:  GI consult appreciated  IVF  Clear liquids ---> low fiber  Insulin coverage  Add low dose lantus  Follow H/H but decrease frequency as H/H stable  PO Protonix    DVT Prophylaxis:  No DVT prophylaxis due to active bleeding    Advanced Directives:  DNR/DNI

## 2019-11-25 NOTE — DISCHARGE NOTE PROVIDER - CARE PROVIDERS DIRECT ADDRESSES
[email or web address]

## 2019-11-25 NOTE — CONSULT NOTE ADULT - SUBJECTIVE AND OBJECTIVE BOX
89 yo female with h/o breast cancer, DM, DVT, HLD, HTN c/o rectal bleeding.  Pt noted blood to undergarments. Not sure where earlier on Thursday.  Went to see  Dr Irvin and UA was done in the office. Showed small blood and sent for a culture.  Patient went home and had two episodes of bleeding, this time appeared to be from rectum.  Seems to her that the bleeding only happens when she sits to urinate on the toilet.  Has some mild suprapubic pain and chronic low back pain.  No fever.  No n/v.  Hasn't had a colonoscopy in many many years, was seen by Dr Robin PAULINO.    Pt reports that prior to admission, had a very large and hard BM that was difficult to pass. Subsequently, had rectal bleeding that filled up her toilet bowl. Baseline bowel habits include BMs every 3 days. Since admission, CTA negative, hgb stable at 10, no further reports of rectal bleeding. States stools are now loose. Denies hemorrhoidal burning/itching or anorectal pain.      Active Problems  Arthritis (716.90) (M19.90)  Atherosclerosis of native coronary artery (414.01) (I25.10)  Bilateral hearing loss (389.9) (H91.93)  Breast cancer (174.9) (C50.919)  Cataract, bilateral (366.9) (H26.9)  Constipation, chronic (564.00) (K59.09)  Depression with anxiety (300.4) (F41.8)  GERD (gastroesophageal reflux disease) (530.81) (K21.9)  Glaucoma (365.9) (H40.9)  Hip fracture (820.8) (S72.009A)  HTN (hypertension) (401.9) (I10)  Hyperlipidemia (272.4) (E78.5)  Influenza A (487.1) (J10.1)  Need for prophylactic vaccination and inoculation against influenza (V04.81) (Z23)  PPD positive (795.51) (R76.11)  Preop cardiovascular exam (V72.81) (Z01.810)  Pulmonary nodule (793.11) (R91.1)  Type 1 diabetes (250.01) (E10.9)  URI, acute (465.9) (J06.9)  UTI symptoms (788.99) (R39.9)  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)    Past Medical History  History of deep vein thrombosis (DVT) of lower extremity (V12.51) (Z86.718)  No pertinent past medical history    Surgical History  History of Appendectomy  History of Breast Surgery  · -Mastectomy  History of Hip Surgery  · -Open Reduction Internal Fixation (ORIF)  -Left Hip Fracture  History of Oophorectomy For Ectopic Pregnancy Left Ovary  History of Tonsillectomy    Family History  Family history of breast cancer (V16.3) (Z80.3) : Sister  Family history of leukemia (V16.6) (Z80.6) : Mother  Family history of malignant neoplasm of larynx (V16.2) (Z80.2) : Brother  FHx: prostate cancer (V16.42) (Z80.42) : Father, Brother    Social History  Former smoker (V15.82) (Z87.891)  Has 2 children  No illicit drug use  Nursing home resident (V60.6) (Z59.3)  · [facility]  Occasional alcohol use   (V61.07) (Z63.4)    Out Patient  Meds  Artificial Tears PF 0.1-0.3 % Ophthalmic Solution; INSTILL 1 DROP 2 times daily each  eye as needed for dry eyes  Aspir-Low 81 MG Oral Tablet Delayed Release; TAKE 1 TABLET BY MOUTH EVERY DAY  Atorvastatin Calcium 10 MG Oral Tablet; TAKE 1 TABLET DAILY AT BEDTIME  Mellissa Contour Next Test STRP; TEST TWICE DAILY  Colace 100 MG Oral Capsule; TAKE 1 CAPSULE 3 TIMES DAILY  Combigan 0.2-0.5 % Ophthalmic Solution; INSTILL 1 DROP, TWICE DAILY  Contour Test In Vitro Strip; USE 1 STRIP 3 TIMES DAILY  Daily Vitamin Oral Tablet; TAKE 1 TABLET DAILY  Durezol 0.05 % Ophthalmic Emulsion  EQ Acid Reducer 150 MG Oral Tablet; Take 1 tablet twice daily  Escitalopram Oxalate 10 MG Oral Tablet; TAKE 1 TABLET BY MOUTH every DAY  Glucagon Emergency 1 MG Injection Kit; USE AS DIRECTED  HumaLOG KwikPen 100 UNIT/ML Subcutaneous Solution Pen-injector; INJECT  SUBCUTANEOUSLY AS DIRECTED: 3 TIMES DAILY, AS PER SLIDING  SCALE  ICaps Areds 2 CAPS; TAKE AS DIRECTED: TAKE 1 CAPSULE TWICE DAILY  Losartan Potassium 25 MG Oral Tablet; Take 1 tablet daily  Lumigan 0.01 % Ophthalmic Solution; INSTILL 1 DROP, DAILY  Methenamine Hippurate 1 GM Oral Tablet; TAKE 1 TABLET TWICE DAILY  Nizatidine 150 MG Oral Capsule; TAKE 1 CAPSULE AT BEDTIME NIGHTLY  Pen Needles 32G X 4 MM; inject ttreee times daily  PenTips 32G X 4 MM; 'INJECT TTREEE TIMES DAILY  Preferred Plus Unifine Pentips 31G X 6 MM  raNITIdine HCl - 150 MG Oral Tablet; Take 1 tablet every 12 hours  Senna 8.6 MG Oral Capsule; TAKE AS DIRECTED: TAKE 2 CAPSULES AT BEDTIME  Tamsulosin HCl - 0.4 MG Oral Capsule; TAKE 1 CAPSULE Daily  TheraCran  MG Oral Capsule; TAKE 1 CAPSULE TWICE DAILY  Timolol Maleate 0.5 % Ophthalmic Solution; INSTILL 1 DROP INTO BOTH EYES 2 TIMES  DAILY  Toujeo SoloStar 300 UNIT/ML Subcutaneous Solution Pen-injector; 'INJECT 40 UNIT  DAILY AS DIRECTED  traMADol HCl - 50 MG Oral Tablet; TAKE 1 TABLET Twice daily MDD:2 tab  Tylenol Extra Strength 500 MG Oral Tablet; Take 1 tablet at noon daily    Allergies  No Known Drug Allergies (22 Nov 2019 10:01)    PAST MEDICAL & SURGICAL HISTORY:  Shoulder fracture, right  DM (diabetes mellitus): type I  Glaucoma  GERD (gastroesophageal reflux disease)  Breast cancer: right  Hearing loss: b/l  Atherosclerosis  DVT (deep venous thrombosis)  HLD (hyperlipidemia)  HTN (hypertension)  History of tonsillectomy  H/O unilateral oophorectomy: left ovary  Elective surgery: left hip surgery from fracture  S/P betzy  Breast cancer: right massectomy    FAMILY HISTORY:  No pertinent family history in first degree relatives    Allergies    epinephrine (Anaphylaxis;)  Originally Entered as [Unknown] reaction to [any anestethics other than lido] (Unknown)    Intolerances      MEDICATIONS  (STANDING):  atorvastatin 10 milliGRAM(s) Oral at bedtime  brimonidine 0.2% Ophthalmic Solution 1 Drop(s) Both EYES two times a day  dextrose 50% Injectable 12.5 Gram(s) IV Push once  dextrose 50% Injectable 25 Gram(s) IV Push once  dextrose 50% Injectable 25 Gram(s) IV Push once  escitalopram 10 milliGRAM(s) Oral daily  hydrocortisone hemorrhoidal Suppository 1 Suppository(s) Rectal daily  insulin glargine Injectable (LANTUS) 5 Unit(s) SubCutaneous at bedtime  insulin lispro (HumaLOG) corrective regimen sliding scale   SubCutaneous three times a day before meals  insulin lispro (HumaLOG) corrective regimen sliding scale   SubCutaneous at bedtime  latanoprost 0.005% Ophthalmic Solution 1 Drop(s) Both EYES at bedtime  losartan 25 milliGRAM(s) Oral daily  multivitamin 1 Tablet(s) Oral daily  pantoprazole    Tablet 40 milliGRAM(s) Oral before breakfast  tamsulosin 0.4 milliGRAM(s) Oral at bedtime  timolol 0.25% Solution 1 Drop(s) Both EYES two times a day    MEDICATIONS  (PRN):  acetaminophen   Tablet .. 650 milliGRAM(s) Oral every 4 hours PRN Temp greater or equal to 38C (100.4F), Mild Pain (1 - 3)  calcium carbonate    500 mG (Tums) Chewable 3 Tablet(s) Chew every 6 hours PRN Dyspepsia  dextrose 40% Gel 15 Gram(s) Oral once PRN Blood Glucose LESS THAN 70 milliGRAM(s)/deciliter  glucagon  Injectable 1 milliGRAM(s) IntraMuscular once PRN Glucose LESS THAN 70 milligrams/deciliter  loperamide 2 milliGRAM(s) Oral every 6 hours PRN Diarrhea  ondansetron Injectable 4 milliGRAM(s) IV Push every 6 hours PRN Nausea  traMADol 50 milliGRAM(s) Oral every 6 hours PRN Moderate Pain (4 - 6)    Vital Signs Last 24 Hrs  T(C): 36.8 (25 Nov 2019 04:52), Max: 37.1 (24 Nov 2019 11:09)  T(F): 98.3 (25 Nov 2019 04:52), Max: 98.8 (24 Nov 2019 11:09)  HR: 60 (25 Nov 2019 04:52) (55 - 65)  BP: 143/47 (25 Nov 2019 04:52) (109/44 - 143/47)  BP(mean): --  RR: 16 (25 Nov 2019 04:52) (16 - 18)  SpO2: 96% (25 Nov 2019 04:52) (96% - 100%)  I&O's Detail    24 Nov 2019 07:01  -  25 Nov 2019 07:00  --------------------------------------------------------  IN:    Oral Fluid: 360 mL  Total IN: 360 mL    OUT:  Total OUT: 0 mL    Total NET: 360 mL                  10.4   5.91  )-----------( 147      ( 25 Nov 2019 08:04 )             31.2       < from: CT Angio Abdomen and Pelvis w/ IV Cont (11.22.19 @ 00:45) >    EXAM:  CT ANGIO ABD PELVIS (W)AW IC                            PROCEDURE DATE:  11/22/2019          INTERPRETATION:  CLINICAL INFORMATION: Gastrointestinal bleeding.    COMPARISON: CT of the abdomen and pelvis from 6/15/2016.    PROCEDURE:   CT of the Abdomen and Pelvis was performed with and without intravenous   contrast.  Precontrast, Arterial and Delayed phases were performed.  Intravenous contrast: 90 ml Omnipaque 350. 10 ml discarded.  Oral contrast: None.  Sagittal and coronal reformats were performed.    FINDINGS:    LOWER CHEST: Elevated left hemidiaphragm with associated adjacent   subsegmental atelectasis. Coronary artery atherosclerotic calcifications.   Right breast is not visualized.    LIVER: Within normal limits.  BILE DUCTS: Mild intrahepatic and extra hepatic biliary ductal dilatation   with the common bile duct measuring up to 9 mm. No discrete   choledocholithiasis.  GALLBLADDER: Surgically absent.  SPLEEN: Punctate calcified granuloma.  PANCREAS: Atrophy of the pancreas. Punctate calcifications scattered   throughout the pancreas likely reflective of chronic pancreatitis.  ADRENALS: Within normal limits.  KIDNEYS/URETERS: Kidneys enhance symmetrically without hydronephrosis.   Subcentimeter low-attenuation lesions in both kidneys are too small to   characterize.    BLADDER: Within normal limits.  REPRODUCTIVE ORGANS: Uterus and adnexa are unremarkable apart from a few   calcified uterine fibroids.    BOWEL: Small hiatus hernia. No bowel obstruction or overt bowel wall   thickening. Colonic diverticulosis without evidence of diverticulitis.   Appendix is not visualized, however, no secondary CT findings to suggest   appendicitis. No active extravasation of vascular contrast into the   enteric lumen to suggest active gastrointestinal bleeding.  PERITONEUM: No pneumoperitoneum, ascites, or loculated collection to   suggest abscess. No mesenteric lymphadenopathy.  VESSELS: Atherosclerotic calcifications of the aortoiliac tree and   abdominal aortic branches. Patent celiac artery, SMA, renal arteries, and   NARCISO.  RETROPERITONEUM/LYMPH NODES: No lymphadenopathy.    ABDOMINAL WALL: Within normal limits.  BONES: Bones are demineralized. Multilevel degenerative changes of the   spine and compression fracture of L2 which has worsened compared with   prior CT of the lumbar spine from 7/8/2018 with mild bony retropulsion.   Interlocking screw and intramedullary darin in the left proximal femur.    IMPRESSION:     No bowel obstruction or evidence of bowel inflammation. Colonic   diverticulosis without evidence of diverticulitis. No active   extravasation of vascular contrast into the enteric lumen to suggest   active gastrointestinal bleeding.                KAREN MONTGOMERY D.O. ATTENDING RADIOLOGIST  This document has been electronically signed. Nov 22 2019  1:51AM        < end of copied text >

## 2019-11-25 NOTE — PHYSICAL THERAPY INITIAL EVALUATION ADULT - PRECAUTIONS/LIMITATIONS, REHAB EVAL
amulate in anticipation of return to assisted living and outpt PT/fall precautions fall precautions/ambulate in anticipation of return to assisted living and outpt PT

## 2019-11-25 NOTE — PHYSICAL THERAPY INITIAL EVALUATION ADULT - MODALITIES TREATMENT COMMENTS
The pt demonstrated fluid, reciprocal gait but required 1 standing rest to readjusted the RW in order to negotiate the environment today- around hospital equipment/ computers, etc. The pt was left sitting OOB and in a chair at end of tx, chair alarm secured. Cb, tray and phone in place.

## 2019-11-25 NOTE — CONSULT NOTE ADULT - ASSESSMENT
A/P - 89F with presumed hemorrhoidal bleeding    Agree with anusol suppositories.  Will add miralax BID as part of bowel regimen.  Encourage increase in daily water intake, at least 64oz.  Pt can followup in office with us for anoscopy +/- rubber band ligation, if necessary.  Otherwise, ok to discharge home.

## 2019-11-26 ENCOUNTER — APPOINTMENT (OUTPATIENT)
Dept: COLORECTAL SURGERY | Facility: CLINIC | Age: 84
End: 2019-11-26
Payer: MEDICARE

## 2019-11-26 VITALS
RESPIRATION RATE: 14 BRPM | BODY MASS INDEX: 25.78 KG/M2 | HEIGHT: 64 IN | WEIGHT: 151 LBS | HEART RATE: 96 BPM | SYSTOLIC BLOOD PRESSURE: 124 MMHG | DIASTOLIC BLOOD PRESSURE: 77 MMHG

## 2019-11-26 DIAGNOSIS — Z87.19 PERSONAL HISTORY OF OTHER DISEASES OF THE DIGESTIVE SYSTEM: ICD-10-CM

## 2019-11-26 DIAGNOSIS — K59.09 OTHER CONSTIPATION: ICD-10-CM

## 2019-11-26 PROCEDURE — 46221 LIGATION OF HEMORRHOID(S): CPT

## 2019-11-26 PROCEDURE — 99203 OFFICE O/P NEW LOW 30 MIN: CPT

## 2019-11-26 NOTE — ASSESSMENT
[FreeTextEntry1] : Here for treatment of bleeding internal hemorrhoids.\par Anoscopy revealed large internal and external hemorrhoids.\par Stool proximal to anoscope brown and without stigmata of bleeding.\par RBL x 2 to posterior column.\par Counselled on post procedure care.\par Advise ample water intake 64-80 oz daily as well as miralax BID.\par RTO 1 month.

## 2019-11-26 NOTE — HISTORY OF PRESENT ILLNESS
[FreeTextEntry1] : Ms. Barriga presents to the office for post hospitalization followup. She had been hospitalized at  from 11/22 - 11/25/19 for rectal bleeding that occurred shortly after an episode of constipation and straining to evacuate a large and hard stool. In hospital, CTA negative and hgb remained stable at 10. Bleeding was presumably due to her internal hemorrhoids. Remote history of colonoscopy with no plans for repeat scope given advanced age. She is here for treatment of her internal hemorrhoids.\par Since discharge yesterday afternoon, she has since passed a stool with a small amount of bleeding noted, per daughter in law.

## 2019-11-26 NOTE — PHYSICAL EXAM
[Normal rectal exam] : exam was normal [Manually Reducible] : a manually reducible (grade III) [Lax] : was lax [Skin Tags] : residual hemorrhoidal skin tags were noted [None] : no [No Rash or Lesion] : No rash or lesion [Alert] : alert [Oriented to Person] : oriented to person [Oriented to Place] : oriented to place [Oriented to Time] : oriented to time [de-identified] : No apparent distress [Calm] : calm [de-identified] : Moving all extremities x4 [de-identified] : Normocephalic atraumatic

## 2019-11-27 ENCOUNTER — MEDICATION RENEWAL (OUTPATIENT)
Age: 84
End: 2019-11-27

## 2019-11-27 RX ORDER — POLYETHYLENE GLYCOL 3350 17 G/17G
17 POWDER, FOR SOLUTION ORAL TWICE DAILY
Qty: 60 | Refills: 6 | Status: DISCONTINUED | COMMUNITY
Start: 2019-11-26 | End: 2019-11-27

## 2019-11-29 DIAGNOSIS — Z86.718 PERSONAL HISTORY OF OTHER VENOUS THROMBOSIS AND EMBOLISM: ICD-10-CM

## 2019-11-29 DIAGNOSIS — K64.8 OTHER HEMORRHOIDS: ICD-10-CM

## 2019-11-29 DIAGNOSIS — K21.9 GASTRO-ESOPHAGEAL REFLUX DISEASE WITHOUT ESOPHAGITIS: ICD-10-CM

## 2019-11-29 DIAGNOSIS — I10 ESSENTIAL (PRIMARY) HYPERTENSION: ICD-10-CM

## 2019-11-29 DIAGNOSIS — Z88.8 ALLERGY STATUS TO OTHER DRUGS, MEDICAMENTS AND BIOLOGICAL SUBSTANCES STATUS: ICD-10-CM

## 2019-11-29 DIAGNOSIS — E11.9 TYPE 2 DIABETES MELLITUS WITHOUT COMPLICATIONS: ICD-10-CM

## 2019-11-29 DIAGNOSIS — K62.5 HEMORRHAGE OF ANUS AND RECTUM: ICD-10-CM

## 2019-11-29 DIAGNOSIS — K59.00 CONSTIPATION, UNSPECIFIED: ICD-10-CM

## 2019-11-29 DIAGNOSIS — E78.5 HYPERLIPIDEMIA, UNSPECIFIED: ICD-10-CM

## 2019-11-29 DIAGNOSIS — F32.9 MAJOR DEPRESSIVE DISORDER, SINGLE EPISODE, UNSPECIFIED: ICD-10-CM

## 2019-11-29 DIAGNOSIS — M19.90 UNSPECIFIED OSTEOARTHRITIS, UNSPECIFIED SITE: ICD-10-CM

## 2019-12-11 ENCOUNTER — RX RENEWAL (OUTPATIENT)
Age: 84
End: 2019-12-11

## 2019-12-13 ENCOUNTER — RX RENEWAL (OUTPATIENT)
Age: 84
End: 2019-12-13

## 2019-12-17 ENCOUNTER — RX RENEWAL (OUTPATIENT)
Age: 84
End: 2019-12-17

## 2019-12-20 ENCOUNTER — APPOINTMENT (OUTPATIENT)
Dept: COLORECTAL SURGERY | Facility: CLINIC | Age: 84
End: 2019-12-20
Payer: MEDICARE

## 2019-12-20 DIAGNOSIS — K64.8 OTHER HEMORRHOIDS: ICD-10-CM

## 2019-12-20 PROCEDURE — 46221 LIGATION OF HEMORRHOID(S): CPT

## 2019-12-20 PROCEDURE — 99213 OFFICE O/P EST LOW 20 MIN: CPT | Mod: 25

## 2019-12-20 NOTE — ASSESSMENT
[FreeTextEntry1] : Here for ongoing treatment of bleeding internal hemorrhoids.\par Anoscopy revealed large internal and external hemorrhoids.\par Stool proximal to anoscope brown and without stigmata of bleeding.\par RBL to posterior column.\par Counselled on post procedure care.\par Advise ample water intake 64-80 oz daily as well as miralax BID.\par RTO prn for recurrent rectal bleeding.

## 2019-12-20 NOTE — HISTORY OF PRESENT ILLNESS
[FreeTextEntry1] : Ms. Barriga presents to the office for post hospitalization followup. She had been hospitalized at  from 11/22 - 11/25/19 for rectal bleeding that occurred shortly after an episode of constipation and straining to evacuate a large and hard stool. In hospital, CTA negative and hgb remained stable at 10. Bleeding was presumably due to her internal hemorrhoids. Remote history of colonoscopy with no plans for repeat scope given advanced age. She is here for treatment of her internal hemorrhoids.\par Since discharge yesterday afternoon, she has since passed a stool with a small amount of bleeding noted, per daughter in law.\par \par 12/20/19 Here for followup. Per daughter in law, no additional bleeding noted. Has been receiving miralax with good effect.

## 2019-12-20 NOTE — PHYSICAL EXAM
[Normal rectal exam] : exam was normal [Reduce Spontaneously] : a spontaneously reducible (grade II) [Skin Tags] : residual hemorrhoidal skin tags were noted [Lax] : was lax [None] : there was no rectal mass  [No Rash or Lesion] : No rash or lesion [Alert] : alert [Oriented to Person] : oriented to person [Oriented to Place] : oriented to place [Oriented to Time] : oriented to time [Calm] : calm [de-identified] : Normocephalic atraumatic [de-identified] : No apparent distress [de-identified] : Moving all extremities x4

## 2019-12-23 ENCOUNTER — RX RENEWAL (OUTPATIENT)
Age: 84
End: 2019-12-23

## 2019-12-29 ENCOUNTER — TRANSCRIPTION ENCOUNTER (OUTPATIENT)
Age: 84
End: 2019-12-29

## 2019-12-30 ENCOUNTER — APPOINTMENT (OUTPATIENT)
Dept: FAMILY MEDICINE | Facility: CLINIC | Age: 84
End: 2019-12-30
Payer: MEDICARE

## 2019-12-30 VITALS
WEIGHT: 151 LBS | DIASTOLIC BLOOD PRESSURE: 60 MMHG | SYSTOLIC BLOOD PRESSURE: 110 MMHG | BODY MASS INDEX: 25.78 KG/M2 | HEIGHT: 64 IN

## 2019-12-30 DIAGNOSIS — J18.9 PNEUMONIA, UNSPECIFIED ORGANISM: ICD-10-CM

## 2019-12-30 PROCEDURE — 99214 OFFICE O/P EST MOD 30 MIN: CPT

## 2019-12-30 RX ORDER — BENZONATATE 100 MG/1
100 CAPSULE ORAL EVERY 8 HOURS
Qty: 40 | Refills: 0 | Status: COMPLETED | COMMUNITY
Start: 2019-12-30 | End: 2020-01-06

## 2019-12-30 NOTE — PHYSICAL EXAM
[Normal] : no jugular venous distention, supple, no lymphadenopathy and the thyroid was normal and there were no nodules present [de-identified] : scattered rhonchi, no rales

## 2019-12-30 NOTE — HISTORY OF PRESENT ILLNESS
[FreeTextEntry1] : On 12/29/19 pt. went to Urgent Care due to cough and cold. Pt. was tested for the flu and it was negative. Pt. also had chest xray.

## 2019-12-30 NOTE — PLAN
[FreeTextEntry1] : O2 sat 94% last PM now 96%\par Continue Augmentin\par Get plenty of rest\par Drink plenty of fluids\par Tylenol or Advil as needed for pain or fever\par F/u if not improving or if symptoms worsen\par Gargle with warm salt water\par Chloraseptic spray as needed for sore throat\par Drink warm liquids\par vaporizer or humidifier\par If not improving will admit to hospital

## 2019-12-30 NOTE — REVIEW OF SYSTEMS
[Shortness Of Breath] : no shortness of breath [Cough] : cough [Wheezing] : no wheezing [Negative] : Psychiatric

## 2020-01-27 ENCOUNTER — RX RENEWAL (OUTPATIENT)
Age: 85
End: 2020-01-27

## 2020-02-13 ENCOUNTER — APPOINTMENT (OUTPATIENT)
Dept: COLORECTAL SURGERY | Facility: CLINIC | Age: 85
End: 2020-02-13

## 2020-03-12 ENCOUNTER — APPOINTMENT (OUTPATIENT)
Dept: COLORECTAL SURGERY | Facility: CLINIC | Age: 85
End: 2020-03-12

## 2020-06-17 ENCOUNTER — APPOINTMENT (OUTPATIENT)
Dept: FAMILY MEDICINE | Facility: CLINIC | Age: 85
End: 2020-06-17
Payer: MEDICARE

## 2020-06-17 VITALS
DIASTOLIC BLOOD PRESSURE: 70 MMHG | HEIGHT: 64 IN | BODY MASS INDEX: 25.78 KG/M2 | SYSTOLIC BLOOD PRESSURE: 110 MMHG | WEIGHT: 151 LBS

## 2020-06-17 DIAGNOSIS — M19.90 UNSPECIFIED OSTEOARTHRITIS, UNSPECIFIED SITE: ICD-10-CM

## 2020-06-17 PROCEDURE — 99213 OFFICE O/P EST LOW 20 MIN: CPT

## 2020-06-17 RX ORDER — NAPHAZOLINE HCL/PHENIRAMINE .0268-.315
0.1-0.3 DROPS OPHTHALMIC (EYE)
Qty: 1 | Refills: 1 | Status: DISCONTINUED | COMMUNITY
Start: 2019-01-23 | End: 2020-06-17

## 2020-06-17 RX ORDER — NIZATIDINE 150 MG/1
150 CAPSULE ORAL
Qty: 90 | Refills: 0 | Status: DISCONTINUED | COMMUNITY
Start: 2019-11-04 | End: 2020-06-17

## 2020-06-17 RX ORDER — RANITIDINE 150 MG/1
150 TABLET ORAL
Qty: 60 | Refills: 5 | Status: DISCONTINUED | COMMUNITY
Start: 2019-07-01 | End: 2020-06-17

## 2020-06-17 NOTE — REVIEW OF SYSTEMS
[Headache] : no headache [Dizziness] : dizziness [Fainting] : no fainting [Memory Loss] : no memory loss [Negative] : Heme/Lymph

## 2020-06-17 NOTE — PHYSICAL EXAM
[Normal] : no CVA or spinal tenderness [de-identified] : Nystagmus, coordination fair   Normal finger to nose.  Unable to support weight to check for balance

## 2020-06-17 NOTE — HISTORY OF PRESENT ILLNESS
[FreeTextEntry8] : Pt. is here to discuss medications, pt. has been dizzy, had 2 falls this year. Since the falling episodes, Sister Rafael mentioned that pt's activities are declining.\par

## 2020-07-02 DIAGNOSIS — F51.04 PSYCHOPHYSIOLOGIC INSOMNIA: ICD-10-CM

## 2020-07-13 RX ORDER — PEN NEEDLE, DIABETIC 32GX 5/32"
32G X 4 MM NEEDLE, DISPOSABLE MISCELLANEOUS
Qty: 100 | Refills: 1 | Status: ACTIVE | COMMUNITY
Start: 2019-07-12 | End: 1900-01-01

## 2020-07-29 NOTE — ED ADULT NURSE NOTE - CHIEF COMPLAINT QUOTE
fall Transposition Flap Text: The defect edges were debeveled with a #15 scalpel blade.  Given the location of the defect and the proximity to free margins a transposition flap was deemed most appropriate.  Using a sterile surgical marker, an appropriate transposition flap was drawn incorporating the defect.    The area thus outlined was incised deep to adipose tissue with a #15 scalpel blade.  The skin margins were undermined to an appropriate distance in all directions utilizing iris scissors.

## 2020-10-23 RX ORDER — ISOPROPYL ALCOHOL 0.7 ML/ML
SWAB TOPICAL
Qty: 3 | Refills: 0 | Status: ACTIVE | COMMUNITY
Start: 2020-10-23 | End: 1900-01-01

## 2020-11-06 ENCOUNTER — NON-APPOINTMENT (OUTPATIENT)
Age: 85
End: 2020-11-06

## 2020-11-06 RX ORDER — INSULIN LISPRO 100 [IU]/ML
100 INJECTION, SOLUTION INTRAVENOUS; SUBCUTANEOUS
Qty: 3 | Refills: 2 | Status: DISCONTINUED | COMMUNITY
End: 2020-11-06

## 2020-12-03 ENCOUNTER — RX RENEWAL (OUTPATIENT)
Age: 85
End: 2020-12-03

## 2020-12-16 PROBLEM — J06.9 URI, ACUTE: Status: RESOLVED | Noted: 2018-01-22 | Resolved: 2020-12-16

## 2021-01-01 ENCOUNTER — RX RENEWAL (OUTPATIENT)
Age: 86
End: 2021-01-01

## 2021-01-01 ENCOUNTER — TRANSCRIPTION ENCOUNTER (OUTPATIENT)
Age: 86
End: 2021-01-01

## 2021-01-01 ENCOUNTER — NON-APPOINTMENT (OUTPATIENT)
Age: 86
End: 2021-01-01

## 2021-01-01 ENCOUNTER — APPOINTMENT (OUTPATIENT)
Dept: FAMILY MEDICINE | Facility: CLINIC | Age: 86
End: 2021-01-01

## 2021-01-01 ENCOUNTER — APPOINTMENT (OUTPATIENT)
Dept: FAMILY MEDICINE | Facility: CLINIC | Age: 86
End: 2021-01-01
Payer: MEDICARE

## 2021-01-01 VITALS
HEIGHT: 64 IN | OXYGEN SATURATION: 95 % | HEART RATE: 53 BPM | DIASTOLIC BLOOD PRESSURE: 60 MMHG | SYSTOLIC BLOOD PRESSURE: 130 MMHG | TEMPERATURE: 97.8 F | BODY MASS INDEX: 25.78 KG/M2 | WEIGHT: 151 LBS

## 2021-01-01 VITALS
WEIGHT: 151 LBS | DIASTOLIC BLOOD PRESSURE: 78 MMHG | HEIGHT: 64 IN | SYSTOLIC BLOOD PRESSURE: 155 MMHG | HEART RATE: 69 BPM | OXYGEN SATURATION: 93 % | TEMPERATURE: 97.4 F | BODY MASS INDEX: 25.78 KG/M2

## 2021-01-01 VITALS
DIASTOLIC BLOOD PRESSURE: 74 MMHG | TEMPERATURE: 98.2 F | OXYGEN SATURATION: 94 % | WEIGHT: 151 LBS | BODY MASS INDEX: 25.78 KG/M2 | HEART RATE: 58 BPM | SYSTOLIC BLOOD PRESSURE: 133 MMHG | HEIGHT: 64 IN

## 2021-01-01 DIAGNOSIS — N18.31 CHRONIC KIDNEY DISEASE, STAGE 3A: ICD-10-CM

## 2021-01-01 DIAGNOSIS — K21.9 GASTRO-ESOPHAGEAL REFLUX DISEASE W/OUT ESOPHAGITIS: ICD-10-CM

## 2021-01-01 DIAGNOSIS — E78.5 HYPERLIPIDEMIA, UNSPECIFIED: ICD-10-CM

## 2021-01-01 DIAGNOSIS — I10 ESSENTIAL (PRIMARY) HYPERTENSION: ICD-10-CM

## 2021-01-01 DIAGNOSIS — F41.8 OTHER SPECIFIED ANXIETY DISORDERS: ICD-10-CM

## 2021-01-01 DIAGNOSIS — J10.1 INFLUENZA DUE TO OTHER IDENTIFIED INFLUENZA VIRUS WITH OTHER RESPIRATORY MANIFESTATIONS: ICD-10-CM

## 2021-01-01 DIAGNOSIS — Z23 ENCOUNTER FOR IMMUNIZATION: ICD-10-CM

## 2021-01-01 DIAGNOSIS — E10.9 TYPE 1 DIABETES MELLITUS W/OUT COMPLICATIONS: ICD-10-CM

## 2021-01-01 DIAGNOSIS — F02.80 ALZHEIMER'S DISEASE, UNSPECIFIED: ICD-10-CM

## 2021-01-01 DIAGNOSIS — G30.9 ALZHEIMER'S DISEASE, UNSPECIFIED: ICD-10-CM

## 2021-01-01 DIAGNOSIS — E10.21 TYPE 1 DIABETES MELLITUS WITH DIABETIC NEPHROPATHY: ICD-10-CM

## 2021-01-01 DIAGNOSIS — Z00.00 ENCOUNTER FOR GENERAL ADULT MEDICAL EXAMINATION W/OUT ABNORMAL FINDINGS: ICD-10-CM

## 2021-01-01 LAB
ALBUMIN SERPL ELPH-MCNC: 4.1 G/DL
ALP BLD-CCNC: 82 U/L
ALT SERPL-CCNC: 8 U/L
ANION GAP SERPL CALC-SCNC: 13 MMOL/L
AST SERPL-CCNC: 12 U/L
BASOPHILS # BLD AUTO: 0.02 K/UL
BASOPHILS NFR BLD AUTO: 0.3 %
BILIRUB SERPL-MCNC: <0.2 MG/DL
BUN SERPL-MCNC: 29 MG/DL
CALCIUM SERPL-MCNC: 9.6 MG/DL
CHLORIDE SERPL-SCNC: 103 MMOL/L
CHOLEST SERPL-MCNC: 130 MG/DL
CO2 SERPL-SCNC: 22 MMOL/L
CREAT SERPL-MCNC: 1.54 MG/DL
EOSINOPHIL # BLD AUTO: 0.43 K/UL
EOSINOPHIL NFR BLD AUTO: 6.4 %
ESTIMATED AVERAGE GLUCOSE: 157 MG/DL
GLUCOSE SERPL-MCNC: 115 MG/DL
HBA1C MFR BLD HPLC: 7.1 %
HCT VFR BLD CALC: 39 %
HDLC SERPL-MCNC: 37 MG/DL
HGB BLD-MCNC: 11.9 G/DL
IMM GRANULOCYTES NFR BLD AUTO: 0.3 %
LDLC SERPL CALC-MCNC: 53 MG/DL
LYMPHOCYTES # BLD AUTO: 1.18 K/UL
LYMPHOCYTES NFR BLD AUTO: 17.5 %
MAN DIFF?: NORMAL
MCHC RBC-ENTMCNC: 30.5 GM/DL
MCHC RBC-ENTMCNC: 31.2 PG
MCV RBC AUTO: 102.4 FL
MONOCYTES # BLD AUTO: 0.63 K/UL
MONOCYTES NFR BLD AUTO: 9.3 %
NEUTROPHILS # BLD AUTO: 4.47 K/UL
NEUTROPHILS NFR BLD AUTO: 66.2 %
NONHDLC SERPL-MCNC: 94 MG/DL
PLATELET # BLD AUTO: 194 K/UL
POTASSIUM SERPL-SCNC: 5.1 MMOL/L
PROT SERPL-MCNC: 7.2 G/DL
RBC # BLD: 3.81 M/UL
RBC # FLD: 13.5 %
SODIUM SERPL-SCNC: 138 MMOL/L
T3FREE SERPL-MCNC: 1.95 PG/ML
T4 FREE SERPL-MCNC: 1.1 NG/DL
TRIGL SERPL-MCNC: 202 MG/DL
TSH SERPL-ACNC: 2.72 UIU/ML
WBC # FLD AUTO: 6.75 K/UL

## 2021-01-01 PROCEDURE — 90715 TDAP VACCINE 7 YRS/> IM: CPT | Mod: GY

## 2021-01-01 PROCEDURE — 81003 URINALYSIS AUTO W/O SCOPE: CPT | Mod: QW

## 2021-01-01 PROCEDURE — 99214 OFFICE O/P EST MOD 30 MIN: CPT | Mod: 25

## 2021-01-01 PROCEDURE — 99214 OFFICE O/P EST MOD 30 MIN: CPT

## 2021-01-01 PROCEDURE — 36415 COLL VENOUS BLD VENIPUNCTURE: CPT

## 2021-01-01 PROCEDURE — 90662 IIV NO PRSV INCREASED AG IM: CPT

## 2021-01-01 PROCEDURE — G0439: CPT

## 2021-01-01 PROCEDURE — G0008: CPT

## 2021-01-01 PROCEDURE — 90472 IMMUNIZATION ADMIN EACH ADD: CPT

## 2021-01-01 RX ORDER — ELECTROLYTES/DEXTROSE
32G X 4 MM SOLUTION, ORAL ORAL
Qty: 100 | Refills: 5 | Status: ACTIVE | COMMUNITY
Start: 1900-01-01 | End: 1900-01-01

## 2021-01-01 RX ORDER — GLUCOSAMINE HCL/CHONDROITIN SU 500-400 MG
3 CAPSULE ORAL
Qty: 90 | Refills: 2 | Status: ACTIVE | COMMUNITY
Start: 2020-07-02 | End: 1900-01-01

## 2021-01-01 RX ORDER — TIMOLOL MALEATE 5 MG/ML
0.5 SOLUTION OPHTHALMIC TWICE DAILY
Qty: 5 | Refills: 5 | Status: DISCONTINUED | COMMUNITY
Start: 2017-08-11 | End: 2021-01-01

## 2021-01-01 RX ORDER — POLYETHYLENE GLYCOL 3350 17 G/17G
17 POWDER, FOR SOLUTION ORAL DAILY
Qty: 1 | Refills: 2 | Status: ACTIVE | COMMUNITY
Start: 2019-11-27 | End: 1900-01-01

## 2021-01-01 RX ORDER — ACETAMINOPHEN 500 MG/1
500 TABLET, COATED ORAL
Qty: 180 | Refills: 2 | Status: ACTIVE | COMMUNITY
Start: 1900-01-01 | End: 1900-01-01

## 2021-01-01 RX ORDER — SENNOSIDES 8.6 MG/1
8.6 CAPSULE, GELATIN COATED ORAL
Qty: 30 | Refills: 5 | Status: ACTIVE | COMMUNITY
Start: 1900-01-01 | End: 1900-01-01

## 2021-01-01 RX ORDER — POLYVINYL ALCOHOL 14 MG/ML
1.4 SOLUTION/ DROPS OPHTHALMIC
Qty: 1 | Refills: 3 | Status: ACTIVE | COMMUNITY
Start: 2020-01-27 | End: 1900-01-01

## 2021-01-08 ENCOUNTER — RX RENEWAL (OUTPATIENT)
Age: 86
End: 2021-01-08

## 2021-01-11 ENCOUNTER — RX RENEWAL (OUTPATIENT)
Age: 86
End: 2021-01-11

## 2021-01-21 ENCOUNTER — OUTPATIENT (OUTPATIENT)
Dept: OUTPATIENT SERVICES | Facility: HOSPITAL | Age: 86
LOS: 1 days | End: 2021-01-21
Payer: MEDICARE

## 2021-01-21 ENCOUNTER — APPOINTMENT (OUTPATIENT)
Dept: RADIOLOGY | Facility: CLINIC | Age: 86
End: 2021-01-21
Payer: MEDICARE

## 2021-01-21 ENCOUNTER — APPOINTMENT (OUTPATIENT)
Dept: FAMILY MEDICINE | Facility: CLINIC | Age: 86
End: 2021-01-21
Payer: MEDICARE

## 2021-01-21 VITALS
DIASTOLIC BLOOD PRESSURE: 60 MMHG | WEIGHT: 151 LBS | HEIGHT: 64 IN | TEMPERATURE: 97.3 F | BODY MASS INDEX: 25.78 KG/M2 | SYSTOLIC BLOOD PRESSURE: 142 MMHG | HEART RATE: 54 BPM | OXYGEN SATURATION: 95 %

## 2021-01-21 DIAGNOSIS — M54.6 PAIN IN THORACIC SPINE: ICD-10-CM

## 2021-01-21 DIAGNOSIS — Z90.89 ACQUIRED ABSENCE OF OTHER ORGANS: Chronic | ICD-10-CM

## 2021-01-21 DIAGNOSIS — C50.919 MALIGNANT NEOPLASM OF UNSPECIFIED SITE OF UNSPECIFIED FEMALE BREAST: ICD-10-CM

## 2021-01-21 DIAGNOSIS — Z41.9 ENCOUNTER FOR PROCEDURE FOR PURPOSES OTHER THAN REMEDYING HEALTH STATE, UNSPECIFIED: Chronic | ICD-10-CM

## 2021-01-21 DIAGNOSIS — C50.919 MALIGNANT NEOPLASM OF UNSPECIFIED SITE OF UNSPECIFIED FEMALE BREAST: Chronic | ICD-10-CM

## 2021-01-21 DIAGNOSIS — Z90.49 ACQUIRED ABSENCE OF OTHER SPECIFIED PARTS OF DIGESTIVE TRACT: Chronic | ICD-10-CM

## 2021-01-21 DIAGNOSIS — Z90.721 ACQUIRED ABSENCE OF OVARIES, UNILATERAL: Chronic | ICD-10-CM

## 2021-01-21 DIAGNOSIS — S72.009A FRACTURE OF UNSPECIFIED PART OF NECK OF UNSPECIFIED FEMUR, INITIAL ENCOUNTER FOR CLOSED FRACTURE: ICD-10-CM

## 2021-01-21 DIAGNOSIS — Z00.8 ENCOUNTER FOR OTHER GENERAL EXAMINATION: ICD-10-CM

## 2021-01-21 PROCEDURE — 71046 X-RAY EXAM CHEST 2 VIEWS: CPT

## 2021-01-21 PROCEDURE — 72100 X-RAY EXAM L-S SPINE 2/3 VWS: CPT | Mod: 26

## 2021-01-21 PROCEDURE — 72100 X-RAY EXAM L-S SPINE 2/3 VWS: CPT

## 2021-01-21 PROCEDURE — 71046 X-RAY EXAM CHEST 2 VIEWS: CPT | Mod: 26

## 2021-01-21 PROCEDURE — 99214 OFFICE O/P EST MOD 30 MIN: CPT | Mod: 25

## 2021-01-21 PROCEDURE — 72110 X-RAY EXAM L-2 SPINE 4/>VWS: CPT

## 2021-01-21 PROCEDURE — 36415 COLL VENOUS BLD VENIPUNCTURE: CPT

## 2021-01-21 NOTE — PHYSICAL EXAM
[Normal] : no respiratory distress, lungs were clear to auscultation bilaterally and no accessory muscle use [Soft] : abdomen soft [Non Tender] : non-tender [de-identified] : Mild pt tenderness to right mid and paraspinal back

## 2021-01-21 NOTE — HISTORY OF PRESENT ILLNESS
[FreeTextEntry8] : Pt is here for right upper abdominal pain. Pt also experiencing pain in her mid back. Seems to hurt more when breaths or coughs. Radiates around to the right side of the back.

## 2021-01-22 ENCOUNTER — NON-APPOINTMENT (OUTPATIENT)
Age: 86
End: 2021-01-22

## 2021-01-22 LAB
BASOPHILS # BLD AUTO: 0.03 K/UL
BASOPHILS NFR BLD AUTO: 0.4 %
CANCER AG125 SERPL-ACNC: 14 U/ML
CEA SERPL-MCNC: 3.2 NG/ML
EOSINOPHIL # BLD AUTO: 0.54 K/UL
EOSINOPHIL NFR BLD AUTO: 7.6 %
HCT VFR BLD CALC: 36.9 %
HGB BLD-MCNC: 11.8 G/DL
IMM GRANULOCYTES NFR BLD AUTO: 0.4 %
LYMPHOCYTES # BLD AUTO: 1.24 K/UL
LYMPHOCYTES NFR BLD AUTO: 17.4 %
MAN DIFF?: NORMAL
MCHC RBC-ENTMCNC: 31.1 PG
MCHC RBC-ENTMCNC: 32 GM/DL
MCV RBC AUTO: 97.4 FL
MONOCYTES # BLD AUTO: 0.63 K/UL
MONOCYTES NFR BLD AUTO: 8.8 %
NEUTROPHILS # BLD AUTO: 4.66 K/UL
NEUTROPHILS NFR BLD AUTO: 65.4 %
PLATELET # BLD AUTO: 200 K/UL
RBC # BLD: 3.79 M/UL
RBC # FLD: 12.7 %
WBC # FLD AUTO: 7.13 K/UL

## 2021-01-25 LAB
ALBUMIN SERPL ELPH-MCNC: 4 G/DL
ALP BLD-CCNC: 87 U/L
ALT SERPL-CCNC: 5 U/L
ANION GAP SERPL CALC-SCNC: 13 MMOL/L
AST SERPL-CCNC: 10 U/L
BILIRUB SERPL-MCNC: 0.2 MG/DL
BUN SERPL-MCNC: 41 MG/DL
CALCIUM SERPL-MCNC: 9.2 MG/DL
CHLORIDE SERPL-SCNC: 99 MMOL/L
CO2 SERPL-SCNC: 25 MMOL/L
CREAT SERPL-MCNC: 1.3 MG/DL
GLUCOSE SERPL-MCNC: 121 MG/DL
POTASSIUM SERPL-SCNC: 5.8 MMOL/L
PROT SERPL-MCNC: 6.8 G/DL
SODIUM SERPL-SCNC: 137 MMOL/L

## 2021-03-05 ENCOUNTER — NON-APPOINTMENT (OUTPATIENT)
Age: 86
End: 2021-03-05

## 2021-04-08 RX ORDER — METHENAMINE HIPPURATE 1 G/1
1 TABLET ORAL TWICE DAILY
Qty: 180 | Refills: 3 | Status: ACTIVE | COMMUNITY
Start: 1900-01-01 | End: 1900-01-01

## 2021-04-08 RX ORDER — GLUCAGON 1 MG
1 KIT INJECTION
Qty: 1 | Refills: 0 | Status: ACTIVE | COMMUNITY
Start: 1900-01-01 | End: 1900-01-01

## 2021-04-22 RX ORDER — BLOOD SUGAR DIAGNOSTIC
STRIP MISCELLANEOUS 3 TIMES DAILY
Qty: 1 | Refills: 3 | Status: DISCONTINUED | COMMUNITY
Start: 2018-02-15 | End: 2021-04-22

## 2021-04-22 RX ORDER — BLOOD SUGAR DIAGNOSTIC
STRIP MISCELLANEOUS TWICE DAILY
Refills: 0 | Status: DISCONTINUED | COMMUNITY
End: 2021-04-22

## 2021-05-20 ENCOUNTER — TRANSCRIPTION ENCOUNTER (OUTPATIENT)
Age: 86
End: 2021-05-20

## 2021-05-24 ENCOUNTER — NON-APPOINTMENT (OUTPATIENT)
Age: 86
End: 2021-05-24

## 2021-05-24 ENCOUNTER — APPOINTMENT (OUTPATIENT)
Dept: FAMILY MEDICINE | Facility: CLINIC | Age: 86
End: 2021-05-24
Payer: MEDICARE

## 2021-05-24 VITALS
DIASTOLIC BLOOD PRESSURE: 62 MMHG | SYSTOLIC BLOOD PRESSURE: 117 MMHG | OXYGEN SATURATION: 95 % | HEART RATE: 54 BPM | BODY MASS INDEX: 25.78 KG/M2 | TEMPERATURE: 98.3 F | WEIGHT: 151 LBS | HEIGHT: 64 IN

## 2021-05-24 DIAGNOSIS — F05 DELIRIUM DUE TO KNOWN PHYSIOLOGICAL CONDITION: ICD-10-CM

## 2021-05-24 DIAGNOSIS — L03.031 CELLULITIS OF RIGHT TOE: ICD-10-CM

## 2021-05-24 PROCEDURE — 99214 OFFICE O/P EST MOD 30 MIN: CPT

## 2021-05-24 NOTE — HISTORY OF PRESENT ILLNESS
[FreeTextEntry8] : pt is here foot infection\par Seen at urgent care for toenail infection. Taking keflex. Seems to be helping a lot\par Has occ episodes of confusion and agitation at night with some paranoia. Requesting medications as needed

## 2021-08-30 PROBLEM — E10.9 TYPE 1 DIABETES: Status: ACTIVE | Noted: 2017-10-18

## 2021-08-30 PROBLEM — Z23 NEED FOR PROPHYLACTIC VACCINATION AND INOCULATION AGAINST INFLUENZA: Status: ACTIVE | Noted: 2017-10-30

## 2021-08-30 PROBLEM — E78.5 HYPERLIPIDEMIA: Status: ACTIVE | Noted: 2017-10-18

## 2021-08-30 PROBLEM — N18.31 CHRONIC KIDNEY DISEASE, STAGE 3A: Status: ACTIVE | Noted: 2021-04-22

## 2021-08-30 PROBLEM — J10.1 INFLUENZA A: Status: ACTIVE | Noted: 2018-01-03

## 2021-08-30 PROBLEM — K21.9 GERD (GASTROESOPHAGEAL REFLUX DISEASE): Status: ACTIVE | Noted: 2017-10-18

## 2021-08-30 NOTE — PHYSICAL EXAM
[No Acute Distress] : no acute distress [Well Nourished] : well nourished [Well Developed] : well developed [Normal] : no acute distress, well nourished, well developed and well-appearing [Well-Appearing] : well-appearing [Normal Sclera/Conjunctiva] : normal sclera/conjunctiva [PERRL] : pupils equal round and reactive to light [EOMI] : extraocular movements intact [Normal Outer Ear/Nose] : the outer ears and nose were normal in appearance [Normal Oropharynx] : the oropharynx was normal [No JVD] : no jugular venous distention [No Lymphadenopathy] : no lymphadenopathy [Supple] : supple [Thyroid Normal, No Nodules] : the thyroid was normal and there were no nodules present [No Respiratory Distress] : no respiratory distress  [No Accessory Muscle Use] : no accessory muscle use [Clear to Auscultation] : lungs were clear to auscultation bilaterally [Normal Rate] : normal rate  [Regular Rhythm] : with a regular rhythm [Normal S1, S2] : normal S1 and S2 [No Murmur] : no murmur heard [No Carotid Bruits] : no carotid bruits [No Abdominal Bruit] : a ~M bruit was not heard ~T in the abdomen [No Varicosities] : no varicosities [Pedal Pulses Present] : the pedal pulses are present [No Edema] : there was no peripheral edema [No Palpable Aorta] : no palpable aorta [No Extremity Clubbing/Cyanosis] : no extremity clubbing/cyanosis [Soft] : abdomen soft [Non Tender] : non-tender [Non-distended] : non-distended [No Masses] : no abdominal mass palpated [No HSM] : no HSM [Normal Bowel Sounds] : normal bowel sounds [Normal Posterior Cervical Nodes] : no posterior cervical lymphadenopathy [Normal Anterior Cervical Nodes] : no anterior cervical lymphadenopathy [No CVA Tenderness] : no CVA  tenderness [No Spinal Tenderness] : no spinal tenderness [No Joint Swelling] : no joint swelling [Grossly Normal Strength/Tone] : grossly normal strength/tone [No Rash] : no rash [Coordination Grossly Intact] : coordination grossly intact [No Focal Deficits] : no focal deficits [Normal Gait] : normal gait [Deep Tendon Reflexes (DTR)] : deep tendon reflexes were 2+ and symmetric [Normal Affect] : the affect was normal [Normal Insight/Judgement] : insight and judgment were intact

## 2021-08-30 NOTE — HEALTH RISK ASSESSMENT
[0-4] : 0-4 [1 or 2 (0 pts)] : 1 or 2 (0 points) [Never (0 pts)] : Never (0 points) [No] : In the past 12 months have you used drugs other than those required for medical reasons? No [No falls in past year] : Patient reported no falls in the past year [0] : 2) Feeling down, depressed, or hopeless: Not at all (0) [HIV test declined] : HIV test declined [Hepatitis C test declined] : Hepatitis C test declined [College] : College [] :  [Fully functional (bathing, dressing, toileting, transferring, walking, feeding)] : Fully functional (bathing, dressing, toileting, transferring, walking, feeding) [Fully functional (using the telephone, shopping, preparing meals, housekeeping, doing laundry, using] : Fully functional and needs no help or supervision to perform IADLs (using the telephone, shopping, preparing meals, housekeeping, doing laundry, using transportation, managing medications and managing finances) [Reports changes in hearing] : Reports changes in hearing [Reports changes in vision] : Reports changes in vision [Smoke Detector] : smoke detector [Safety elements used in home] : safety elements used in home [Seat Belt] :  uses seat belt [Sunscreen] : uses sunscreen [Very Good] : ~his/her~  mood as very good [] : No [PHQ-2 Negative - No further assessment needed] : PHQ-2 Negative - No further assessment needed [HFG2Adien] : 0 [Change in mental status noted] : No change in mental status noted [Language] : denies difficulty with language [Behavior] : denies difficulty with behavior [Learning/Retaining New Information] : denies difficulty learning/retaining new information [Handling Complex Tasks] : denies difficulty handling complex tasks [Reasoning] : denies difficulty with reasoning [Spatial Ability and Orientation] : denies difficulty with spatial ability and orientation [Retired] : retired [# Of Children ___] : has [unfilled] children [Sexually Active] : not sexually active [High Risk Behavior] : no high risk behavior [Feels Safe at Home] : Feels safe at home [Reports normal functional visual acuity (ie: able to read med bottle)] : Reports poor functional visual acuity.  [Reports changes in dental health] : Reports no changes in dental health [Carbon Monoxide Detector] : no carbon monoxide detector [Guns at Home] : no guns at home [Travel to Developing Areas] : does not  travel to developing areas [Caregiver Concerns] : does not have caregiver concerns [de-identified] : Assisted living [With Patient/Caregiver] : , with patient/caregiver [Designated Healthcare Proxy] : Designated healthcare proxy [Name: ___] : Health Care Proxy's Name: [unfilled]  [Relationship: ___] : Relationship: [unfilled] [AdvancecareDate] : 08/21

## 2021-11-01 PROBLEM — E10.21: Status: ACTIVE | Noted: 2021-03-05

## 2021-11-01 PROBLEM — F41.8 DEPRESSION WITH ANXIETY: Status: ACTIVE | Noted: 2019-01-23

## 2021-11-01 PROBLEM — I10 HTN (HYPERTENSION): Status: ACTIVE | Noted: 2017-10-18

## 2021-11-01 PROBLEM — G30.9 DEMENTIA IN ALZHEIMER'S DISEASE: Status: ACTIVE | Noted: 2021-01-01

## 2021-11-01 NOTE — HISTORY OF PRESENT ILLNESS
[Other: ___] : [unfilled] [FreeTextEntry6] : Pt is here for medication renewal. Currently taking Seroquel 25 TID. Pretty calm today.

## 2022-01-01 ENCOUNTER — RX RENEWAL (OUTPATIENT)
Age: 87
End: 2022-01-01

## 2022-01-01 DIAGNOSIS — G30.9 ALZHEIMER'S DISEASE, UNSPECIFIED: Chronic | ICD-10-CM

## 2022-01-01 DIAGNOSIS — F02.80 ALZHEIMER'S DISEASE, UNSPECIFIED: Chronic | ICD-10-CM

## 2022-01-01 DIAGNOSIS — N18.32 CHRONIC KIDNEY DISEASE, STAGE 3B: Chronic | ICD-10-CM

## 2022-01-01 DIAGNOSIS — H35.3290 EXUDATIVE AGE-RELATED MACULAR DEGENERATION, UNSPECIFIED EYE, STAGE UNSPECIFIED: Chronic | ICD-10-CM

## 2022-01-01 RX ORDER — ATORVASTATIN CALCIUM 10 MG/1
10 TABLET, FILM COATED ORAL
Qty: 90 | Refills: 3 | Status: DISCONTINUED | COMMUNITY
Start: 1900-01-01 | End: 2022-01-01

## 2022-01-01 RX ORDER — INSULIN ASPART 100 [IU]/ML
100 INJECTION, SOLUTION INTRAVENOUS; SUBCUTANEOUS
Qty: 15 | Refills: 0 | Status: ACTIVE | COMMUNITY
Start: 2020-11-06 | End: 1900-01-01

## 2022-01-01 RX ORDER — BLOOD SUGAR DIAGNOSTIC
STRIP MISCELLANEOUS 3 TIMES DAILY
Qty: 1 | Refills: 2 | Status: ACTIVE | COMMUNITY
Start: 2020-12-03 | End: 1900-01-01

## 2022-01-01 RX ORDER — QUETIAPINE FUMARATE 50 MG/1
50 TABLET ORAL
Qty: 135 | Refills: 0 | Status: ACTIVE | COMMUNITY
Start: 2021-05-24 | End: 1900-01-01

## 2022-01-01 RX ORDER — INSULIN GLARGINE 300 U/ML
300 INJECTION, SOLUTION SUBCUTANEOUS
Qty: 4.5 | Refills: 3 | Status: ACTIVE | COMMUNITY
Start: 2018-08-10 | End: 1900-01-01

## 2022-01-01 RX ORDER — TRAMADOL HYDROCHLORIDE 50 MG/1
50 TABLET, COATED ORAL
Qty: 30 | Refills: 0 | Status: ACTIVE | COMMUNITY
Start: 2018-08-27 | End: 1900-01-01

## 2022-01-01 RX ORDER — TAMSULOSIN HYDROCHLORIDE 0.4 MG/1
0.4 CAPSULE ORAL
Qty: 90 | Refills: 1 | Status: ACTIVE | COMMUNITY
Start: 2019-02-21 | End: 1900-01-01

## 2022-01-01 RX ORDER — MULTIVITAMIN
TABLET ORAL DAILY
Qty: 100 | Refills: 1 | Status: DISCONTINUED | COMMUNITY
Start: 1900-01-01 | End: 2022-01-01

## 2022-01-01 RX ORDER — RISPERIDONE 0.5 MG/1
0.5 TABLET, FILM COATED ORAL
Qty: 30 | Refills: 0 | Status: ACTIVE | COMMUNITY
Start: 2022-01-01

## 2022-01-01 RX ORDER — POTASSIUM &MAGNESIUM ASPARTATE 250-250 MG
500 CAPSULE ORAL
Qty: 90 | Refills: 0 | Status: DISCONTINUED | COMMUNITY
Start: 1900-01-01 | End: 2022-01-01

## 2022-01-01 RX ORDER — DOCUSATE SODIUM 100 MG/1
100 CAPSULE ORAL 3 TIMES DAILY
Qty: 270 | Refills: 2 | Status: DISCONTINUED | COMMUNITY
Start: 1900-01-01 | End: 2022-01-01

## 2022-01-01 RX ORDER — ASPIRIN ENTERIC COATED TABLETS 81 MG 81 MG/1
81 TABLET, DELAYED RELEASE ORAL DAILY
Qty: 100 | Refills: 2 | Status: DISCONTINUED | COMMUNITY
Start: 1900-01-01 | End: 2022-01-01

## 2022-01-01 RX ORDER — ESCITALOPRAM OXALATE 10 MG/1
10 TABLET ORAL
Qty: 90 | Refills: 0 | Status: ACTIVE | COMMUNITY
Start: 2019-01-23 | End: 1900-01-01

## 2022-01-01 RX ORDER — LANCETS
EACH MISCELLANEOUS
Qty: 1 | Refills: 3 | Status: ACTIVE | COMMUNITY
Start: 2021-04-22 | End: 1900-01-01

## 2022-01-01 RX ORDER — MV-MIN/FA/VIT K/LUTEIN/ZEAXANT 200MCG-5MG
CAPSULE ORAL
Qty: 60 | Refills: 3 | Status: DISCONTINUED | COMMUNITY
Start: 2021-01-08 | End: 2022-01-01

## 2022-01-01 RX ORDER — LOSARTAN POTASSIUM 50 MG/1
50 TABLET, FILM COATED ORAL DAILY
Qty: 90 | Refills: 0 | Status: ACTIVE | COMMUNITY
Start: 1900-01-01 | End: 1900-01-01

## 2022-01-01 RX ORDER — VALPROIC ACID 250 MG/5ML
250 SOLUTION ORAL 3 TIMES DAILY
Qty: 500 | Refills: 0 | Status: ACTIVE | COMMUNITY
Start: 2022-01-01

## 2022-01-01 RX ORDER — FAMOTIDINE 20 MG/1
20 TABLET, FILM COATED ORAL TWICE DAILY
Qty: 180 | Refills: 0 | Status: ACTIVE | COMMUNITY
Start: 2020-06-17 | End: 1900-01-01

## 2022-01-01 RX ORDER — PEN NEEDLE, DIABETIC 31 G X1/4"
32G X 4 MM NEEDLE, DISPOSABLE MISCELLANEOUS
Qty: 100 | Refills: 5 | Status: ACTIVE | COMMUNITY
Start: 2022-01-01 | End: 1900-01-01

## 2022-02-06 NOTE — PROGRESS NOTE ADULT - ATTENDING COMMENTS
time includes exam, documentation and discharge arrangements and medication reconciliation as well as documentation of clinical course for transfer See Discharge note to follow Diya REYES

## 2022-02-21 NOTE — DISCHARGE NOTE ADULT - HAS THE PATIENT RECEIVED THE INFLUENZA VACCINE DURING THIS VISIT
methylphenidate (METADATE CD) 60 MG CR capsule 30 capsule 0 1/28/2022 2/27/2022    Sig - Route: Take 1 capsule by mouth every morning      Last ov: 12/27/21   Next ov: none     PDMP reviewed   Prescribed: 1/27/2022  Dispensed: 1/28/2022  Sold: 1/28/2022   No

## 2022-06-09 NOTE — ED PROVIDER NOTE - CROS ED ROS STATEMENT
This was a shared visit with the GERARDO. I reviewed and verified the documentation and independently performed the documented:
all other ROS negative except as per HPI

## 2022-06-27 PROBLEM — N18.32 CHRONIC KIDNEY DISEASE, STAGE 3B: Chronic | Status: ACTIVE | Noted: 2022-01-01

## 2022-06-27 PROBLEM — H35.3290 WET SENILE MACULAR DEGENERATION: Chronic | Status: ACTIVE | Noted: 2017-10-18

## 2022-06-27 PROBLEM — G30.9 ALZHEIMER DISEASE: Chronic | Status: ACTIVE | Noted: 2022-01-01

## 2022-09-06 NOTE — DISCHARGE NOTE ADULT - HAS THE PATIENT RECEIVED THE INFLUENZA VACCINE DURING THIS VISIT
Left Foot Pain     Onset of Symptoms: 06/27/22 patient was seen in office for left swelling. Patient was recommended to follow up with vascular surgeon and to purchase compression stockings. Per patient's son, \" The swelling is in her left foot on the bottom pad. She won't let me put the compression stockings on her, because she complains they are too tight. She will put ice on it and then it gets better. I figured since we were in the building at 100PM someone could look at it as she will be out of town next month.\" Patient was seen by Emma LOZADA for this concern back on 07/12/22. Patient's son was encouraged to follow up with their office for further recommendations in regards to patient's current left foot swelling. Patient's son is agreeable to plan and will return call to office if Glenny LOZADA's office can not further assist patient. Patient's son has no further questions or concerns.       Denies: Chest pain or SOB-none, lightheadedness or dizziness.         
Patient's Son (Sabas) states Aisha will be in the building seeing Dermatology at 1pm today, could Pt be seen around 1:45pm to have you take a look at her swollen left foot? Pt. will be out of town later in the month and Son feels this shouldn't wait if something is wrong. Or Pt can be seen any other day this week if you are able to make an appointment exception. Please advise.  
received at assisted living/No

## 2022-10-13 NOTE — ED PROVIDER NOTE - NS ED NOTE AC HIGH RISK COUNTRIES
Report given to Sharath HAAS at [facility]. [place] ETA approx [phone].    No

## 2023-01-23 NOTE — ED PROVIDER NOTE - OTHER FINDINGS
No ectopy Cimzia Pregnancy And Lactation Text: This medication crosses the placenta but can be considered safe in certain situations. Cimzia may be excreted in breast milk.

## 2023-01-31 NOTE — PATIENT PROFILE ADULT - ARRIVAL FROM
Home Melolabial Transposition Flap Text: The defect edges were debeveled with a #15 scalpel blade.  Given the location of the defect and the proximity to free margins a melolabial flap was deemed most appropriate.  Using a sterile surgical marker, an appropriate melolabial transposition flap was drawn incorporating the defect.    The area thus outlined was incised deep to adipose tissue with a #15 scalpel blade.  The skin margins were undermined to an appropriate distance in all directions utilizing iris scissors.

## 2024-01-01 NOTE — ASU PATIENT PROFILE, ADULT - PT NEEDS ASSIST
"Occupational Therapy   Evaluation and Discharge Note    Name: Radha Sandoval  MRN: [id number]  Admitting Diagnosis: Acute cystitis  Recent Surgery: * No surgery found *      Recommendations:     Discharge Recommendations: No Therapy Indicated  Discharge Equipment Recommendations: none  Barriers to discharge:  None    Assessment:     Radha Sandoval is a 87 y.o. female with a medical diagnosis of Acute cystitis. At this time, patient is functioning at their prior level of function and does not require further acute OT services.     Plan:     During this hospitalization, patient does not require further acute OT services.  Please re-consult if situation changes.    Plan of Care Reviewed with: patient    Subjective     Chief Complaint: " the doctor told me not to get up"   Patient/Family Comments/goals: none stated     Occupational Profile:  Living Environment: pt lives at NH with staff assist   Previous level of function: w/c and bed bound per documentation   Roles and Routines: feeds self   Equipment Used at home: wheelchair  Assistance upon Discharge: NH staff assist     Pain/Comfort:  Pain Rating 1: 0/10  Pain Rating Post-Intervention 1: 0/10    Patients cultural, spiritual, Mandaeism conflicts given the current situation: no    Objective:     Communicated with: ELEAZAR Parson prior to session.  Patient found HOB elevated with telemetry, PureWick, bed alarm upon OT entry to room.    General Precautions: Standard,    Orthopedic Precautions: N/A  Braces: N/A  Respiratory Status: Room air     Occupational Performance:    Bed Mobility:    Patient completed Supine to Sit with total assistance  Patient completed Sit to Supine with total assistance    Functional Mobility/Transfers:  DNT    Activities of Daily Living:  Grooming: setup wash face with HOB elevated    Upper Body Dressing: maximal assistance don gown     Cognitive/Visual Perceptual:  Cognitive/Psychosocial Skills:     -       Oriented to: Person   -       Follows " Commands/attention:Easily distracted and Follows one-step commands  -       Safety awareness/insight to disability: impaired     Physical Exam:  Upper Extremity Range of Motion:     -       Right Upper Extremity: WFL  -       Left Upper Extremity: WFL  Upper Extremity Strength:    -       Right Upper Extremity: WFL  -       Left Upper Extremity: WFL   Strength:    -       Right Upper Extremity: WFL  -       Left Upper Extremity: WFL     AMPAC 6 Click ADL:  AMPAC Total Score: 11    Treatment & Education:  Pt participated in OT tx session with ADLS & functional mobility performed as documented above. Education provided on role of OT including safe performance of self-care tasks, mobility techniques, safe use of DME, and encouragement of mobilization during hospitalization to prevent/limit deconditioning as well as discharge recommendations     Patient left HOB elevated with all lines intact, call button in reach, and bed alarm on    GOALS:   Multidisciplinary Problems       Occupational Therapy Goals       Not on file                    History:     Past Medical History:   Diagnosis Date    Acute deep vein thrombosis (DVT) of femoral vein of right lower extremity 05/23/2023    Acute pulmonary embolism without acute cor pulmonale 05/22/2023    Atrial fibrillation with RVR 08/01/2023    Chronic anticoagulation     Chronic bilateral pleural effusions 05/22/2023    Debility 04/25/2023    Hygroma 07/08/2023    Late onset Alzheimer's dementia with mood disturbance 05/22/2023    Lumbar spondylosis with myelopathy 04/25/2023    Multiple closed right sided fractures of pelvis without disruption of pelvic ring 07/09/2023    Primary hypertension 06/10/2022    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         Past Surgical History:   Procedure Laterality Date    REPAIR, HERNIA, INGUINAL, WITHOUT HISTORY OF PRIOR REPAIR, AGE 5 YEARS OR OLDER Right 5/6/2023    Procedure: REPAIR, HERNIA, INGUINAL, WITHOUT HISTORY OF PRIOR REPAIR, AGE 5 YEARS  OR OLDER;  Surgeon: Maurice Piper MD;  Location: [facility] OR [address] [place] [address];  Service: General;  Laterality: Right;  possible laparotomy, possible bowel resection       Time Tracking:     OT Date of Treatment: 01/01/24  OT Start Time: 0926  OT Stop Time: 0936  OT Total Time (min): 10 min    Billable Minutes:Evaluation 10  Overlap with PT for portions of session due to complex nature of patient and for safety with mobility and performance of self-care tasks to decrease fall risk as well as patient and caregiver injury as pt requires two skilled therapists to provide interventions.       1/1/2024   no

## 2024-12-22 NOTE — ED PROVIDER NOTE - CROS ED [facility] ALL NEG
History of foot injury approximately 10 days ago, has dark discoloration of distal aspect of fourth left toe with pain. She was seen by podiatry and sent to ER for admission  Podiatry input appreciated.  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  Given continued pain and worsening gangrene, recommend IR evaluation for possible angiogram.  Per IR team, likely angiogram to be performed on Monday  Blood cultures: No growth to date  ESR 83  CRP 13.6  LEADs reviewed  Pain management following due to difficult to control pain   - - -

## 2025-02-13 NOTE — ED ADULT NURSE REASSESSMENT NOTE - NS ED NURSE REASSESS COMMENT FT1
Last seen: 10/17/2024  Follow up appointment: 4/17/2025  Last filled: alprazolam  0.5 mg and tramadol 50 mg      Okay to refill?   Please advise.    
report rec'd from Margarita GUAN RN pt awaiting social work  [place] for placement, pt resting comfortably will cont to monitor
awake, pt without complaints, breakfast provided.
